# Patient Record
Sex: FEMALE | Race: ASIAN | Employment: OTHER | URBAN - METROPOLITAN AREA
[De-identification: names, ages, dates, MRNs, and addresses within clinical notes are randomized per-mention and may not be internally consistent; named-entity substitution may affect disease eponyms.]

---

## 2019-10-01 ENCOUNTER — HOSPITAL ENCOUNTER (OUTPATIENT)
Dept: WOMENS IMAGING | Age: 54
Discharge: HOME | End: 2019-10-01
Payer: COMMERCIAL

## 2019-10-01 DIAGNOSIS — M85.80 OSTEOPENIA, UNSPECIFIED LOCATION: ICD-10-CM

## 2019-10-01 DIAGNOSIS — Z12.31 ENCOUNTER FOR SCREENING MAMMOGRAM FOR BREAST CANCER: ICD-10-CM

## 2019-10-01 PROCEDURE — 77080 DXA BONE DENSITY AXIAL: CPT

## 2019-10-01 PROCEDURE — 77063 BREAST TOMOSYNTHESIS BI: CPT

## 2020-07-23 ENCOUNTER — OFFICE VISIT (OUTPATIENT)
Dept: ORTHOPEDIC SURGERY | Age: 55
End: 2020-07-23
Payer: COMMERCIAL

## 2020-07-23 VITALS
RESPIRATION RATE: 16 BRPM | OXYGEN SATURATION: 99 % | WEIGHT: 158 LBS | HEART RATE: 78 BPM | HEIGHT: 61 IN | BODY MASS INDEX: 29.83 KG/M2

## 2020-07-23 PROCEDURE — 99202 OFFICE O/P NEW SF 15 MIN: CPT | Performed by: PHYSICIAN ASSISTANT

## 2020-07-23 RX ORDER — CELECOXIB 200 MG/1
200 CAPSULE ORAL DAILY
Qty: 60 CAPSULE | Refills: 0 | Status: SHIPPED | OUTPATIENT
Start: 2020-07-23 | End: 2021-09-16

## 2020-07-23 RX ORDER — CELECOXIB 200 MG/1
200 CAPSULE ORAL
COMMUNITY
Start: 2020-05-12 | End: 2020-07-23 | Stop reason: ALTCHOICE

## 2020-07-23 NOTE — PATIENT INSTRUCTIONS
Celebrex was sent into your [facility] on [address] [facility]  Please take medication as instructed  Follow up as needed  Call the office if the pain worsen or continues after 60 days    Patient Education        Knee Arthritis: Exercises  Introduction  Here are some examples of exercises for you to try. The exercises may be suggested for a condition or for rehabilitation. Start each exercise slowly. Ease off the exercises if you start to have pain. You will be told when to start these exercises and which ones will work best for you. How to do the exercises  Knee flexion with heel slide   1. Lie on your back with your knees bent. 2. Slide your heel back by bending your affected knee as far as you can. Then hook your other foot around your ankle to help pull your heel even farther back. 3. Hold for about 6 seconds, then rest for up to 10 seconds. 4. Repeat 8 to 12 times. 5. Switch legs and repeat steps 1 through 4, even if only one knee is sore. Quad sets   1. Sit with your affected leg straight and supported on the floor or a firm bed. Place a small, rolled-up towel under your knee. Your other leg should be bent, with that foot flat on the floor. 2. Tighten the thigh muscles of your affected leg by pressing the back of your knee down into the towel. 3. Hold for about 6 seconds, then rest for up to 10 seconds. 4. Repeat 8 to 12 times. 5. Switch legs and repeat steps 1 through 4, even if only one knee is sore. Straight-leg raises to the front   1. Lie on your back with your good knee bent so that your foot rests flat on the floor. Your affected leg should be straight. Make sure that your low back has a normal curve. You should be able to slip your hand in between the floor and the small of your back, with your palm touching the floor and your back touching the back of your hand. 2. Tighten the thigh muscles in your affected leg by pressing the back of your knee flat down to the floor.  Hold your knee straight. 3. Keeping the thigh muscles tight and your leg straight, lift your affected leg up so that your heel is about 12 inches off the floor. Hold for about 6 seconds, then lower slowly. 4. Relax for up to 10 seconds between repetitions. 5. Repeat 8 to 12 times. 6. Switch legs and repeat steps 1 through 5, even if only one knee is sore. Active knee flexion   1. Lie on your stomach with your knees straight. If your kneecap is uncomfortable, roll up a washcloth and put it under your leg just above your kneecap. 2. Lift the foot of your affected leg by bending the knee so that you bring the foot up toward your buttock. If this motion hurts, try it without bending your knee quite as far. This may help you avoid any painful motion. 3. Slowly move your leg up and down. 4. Repeat 8 to 12 times. 5. Switch legs and repeat steps 1 through 4, even if only one knee is sore. Quadriceps stretch (facedown)   1. Lie flat on your stomach, and rest your face on the floor. 2. Wrap a towel or belt strap around the lower part of your affected leg. Then use the towel or belt strap to slowly pull your heel toward your buttock until you feel a stretch. 3. Hold for about 15 to 30 seconds, then relax your leg against the towel or belt strap. 4. Repeat 2 to 4 times. 5. Switch legs and repeat steps 1 through 4, even if only one knee is sore. Stationary exercise bike   1. If you do not have a stationary exercise bike at home, you can find one to ride at your local health club or community center. 2. Adjust the height of the bike seat so that your knee is slightly bent when your leg is extended downward. If your knee hurts when the pedal reaches the top, you can raise the seat so that your knee does not bend as much. 3. Start slowly. At first, try to do 5 to 10 minutes of cycling with little to no resistance. Then increase your time and the resistance bit by bit until you can do 20 to 30 minutes without pain.   4. If you start to have pain, rest your knee until your pain gets back to the level that is normal for you. Or cycle for less time or with less effort. Follow-up care is a key part of your treatment and safety. Be sure to make and go to all appointments, and call your doctor if you are having problems. It's also a good idea to know your test results and keep a list of the medicines you take. Where can you learn more? Go to https://[employer] ([employer] org and sign in to your Dapt account. Enter C159 in the Ide[employer] box to learn more about \"Knee Arthritis: Exercises. \"     If you do not have an account, please click on the \"Sign Up Now\" link. Current as of: March 2, 2020               Content Version: 12.5  © 7749-0181 Healthwise, Incorporated. Care instructions adapted under license by [facility] ([facility]). If you have questions about a medical condition or this instruction, always ask your healthcare professional. [place] [address] any warranty or liability for your use of this information.

## 2020-07-23 NOTE — PROGRESS NOTES
Review of Systems   Constitutional: Negative. HENT: Negative. Eyes: Negative. Respiratory: Negative. Cardiovascular: Negative. Gastrointestinal: Negative. Genitourinary: Negative. Musculoskeletal: Positive for arthralgias, gait problem, joint swelling and myalgias. Skin: Negative. Negative for rash and wound. Psychiatric/Behavioral: Negative. HPI:  Isidro Nava is a 62 y.o. female that is new to the office. No referral from another physician. Patient is in the office today with Right Leg Pain mainly in the right knee . Patient states this injury happened on or around 3 months ago. Pain is described a dull sharp achy pains and location of the pain is on the lateral and medial side of the knee. Patient was seen at [facility], [place], operated by [facility] and saw Dr. Tequila Mccray and patient wants a second opinion on the knee pain. They stated that it was just regular knee pain. Patient states that in her Worship they have to get down on there knees and pray. She had to do that twice in 15 day period. Patient also picked up more shifts at work causing the pain to increase. History reviewed. No pertinent past medical history. History reviewed. No pertinent surgical history. History reviewed. No pertinent family history.     Social History     Socioeconomic History    Marital status:      Spouse name: Joy Calles Number of children: 2    Years of education: 12    Highest education level: None   Occupational History    None   Social Needs    Financial resource strain: None    Food insecurity     Worry: None     Inability: None    Transportation needs     Medical: None     Non-medical: None   Tobacco Use    Smoking status: Never Smoker    Smokeless tobacco: Never Used   Substance and Sexual Activity    Alcohol use: Yes     Comment: occasionally    Drug use: No    Sexual activity: Yes     Partners: Male   Lifestyle    Physical activity     Days per week: None     Minutes per session: None    Stress: None   Relationships    Social connections     Talks on phone: None     Gets together: None     Attends Baptism service: None     Active member of club or organization: None     Attends meetings of clubs or organizations: None     Relationship status: None    Intimate partner violence     Fear of current or ex partner: None     Emotionally abused: None     Physically abused: None     Forced sexual activity: None   Other Topics Concern    None   Social History Narrative    None       Current Outpatient Medications   Medication Sig Dispense Refill    celecoxib (CELEBREX) 200 MG capsule Take 1 capsule by mouth daily 60 capsule 0     No current facility-administered medications for this visit. No Known Allergies    Review of Systems:  See above      Physical Exam:   Pulse 78   Resp 16   Ht 5' 1\" (1.549 m)   Wt 158 lb (71.7 kg)   SpO2 99%   BMI 29.85 kg/m²        Gait is Normal. The patient can bear weight on the injured extremity. Gen/Psych:Examination reveals a pleasant individual in no acute distress. The patient is oriented to time, place and person. The patient's mood and affect are appropriate. Patient appears well nourished. Body habitus is mildly overweight     Lymph:  no lymphedema in bilateral lower extremities     Skin intact in bilateral lower extremities with no ulcerations, lesions, rash, erythema. Vascular: There are no varicosities in bilateral lower extremities, sensation intact to light touch over bilateral lower extremities.     right leg/knee exam:  Leg alignment:     neutral  Quadriceps/hamstring atrophy:   no  Knee effusion:    no   Knee erythema:   no  ROM:     0-130°  Lachman:    no  Pivot Shift:    no  Posterior drawer:   no  Lateral patella glide at 30 deg's: 20%  Medial patella glide at 30 deg's: 10mm  Increased ER at 30 deg's:  no  Varus laxity at 0 and 30 deg's: no  Valguslaxity at 0 and 30 deg's: no  Recurvatum:    no  Tenderness at:   Mild posterior lateral knee pain    Knee strength is 5/5 flexion and extension  There is + L2-S1 motor and sensory function. Imaging studies:    3 views of the right knee taken reviewed in the office today show no fractures, dislocations, no significant degenerative changes within the knee, no joint effusion. The official read and interpretation of these x-rays will be done by the the [facility]         Impression:  right knee DJD      Plan:  Natural history and expected course discussed. Questions answered.   [facility] [address] [facility]  Please take medication as instructed  Follow up as needed  Call the office if the pain worsen or continues after 60 days

## 2020-07-31 ASSESSMENT — ENCOUNTER SYMPTOMS
EYES NEGATIVE: 1
GASTROINTESTINAL NEGATIVE: 1
RESPIRATORY NEGATIVE: 1

## 2020-10-08 ENCOUNTER — HOSPITAL ENCOUNTER (OUTPATIENT)
Dept: WOMENS IMAGING | Age: 55
Discharge: HOME | End: 2020-10-08
Payer: COMMERCIAL

## 2020-10-08 PROCEDURE — 77063 BREAST TOMOSYNTHESIS BI: CPT

## 2020-10-19 ENCOUNTER — OFFICE VISIT (OUTPATIENT)
Dept: ORTHOPEDIC SURGERY | Age: 55
End: 2020-10-19
Payer: COMMERCIAL

## 2020-10-19 VITALS
HEART RATE: 81 BPM | BODY MASS INDEX: 29.83 KG/M2 | HEIGHT: 61 IN | WEIGHT: 158 LBS | OXYGEN SATURATION: 98 % | RESPIRATION RATE: 16 BRPM

## 2020-10-19 PROCEDURE — 20610 DRAIN/INJ JOINT/BURSA W/O US: CPT | Performed by: PHYSICIAN ASSISTANT

## 2020-10-19 PROCEDURE — 99212 OFFICE O/P EST SF 10 MIN: CPT | Performed by: PHYSICIAN ASSISTANT

## 2020-10-19 ASSESSMENT — ENCOUNTER SYMPTOMS
RESPIRATORY NEGATIVE: 1
GASTROINTESTINAL NEGATIVE: 1
EYES NEGATIVE: 1

## 2020-10-19 NOTE — PATIENT INSTRUCTIONS
Continue weight bear as tolerated  Continue range of motion and exercises as instruction  Ice and elevate as needed  Tylenol or Motrin for pain  Injection given today in the office   Rest for 24-48 hours  Follow up 6 weeks.  If you're feeling better cancel the appointment

## 2020-10-19 NOTE — PROGRESS NOTES
Review of Systems   Constitutional: Negative. HENT: Negative. Eyes: Negative. Respiratory: Negative. Cardiovascular: Negative. Gastrointestinal: Negative. Genitourinary: Negative. Musculoskeletal: Positive for arthralgias. Skin: Negative. Negative for rash and wound. Neurological: Negative. Psychiatric/Behavioral: Negative. HPI:  Reva Sapp is a 62 y.o. female presents the office today complaining of persistent right knee pain around the patella worse when she tries to bend or squat on the right knee. Previously she was seen in the office in July and at that time she just wanted to treat the knee pain with oral medication and she was given Celebrex which has not helped. She states the pain is a 6/10, aching, sharp within the patella and lateral compartment of the knee. She has not had any injuries to the knee since I last saw her. No past medical history on file. No past surgical history on file. No family history on file.     Social History     Socioeconomic History    Marital status:      Spouse name: Orlando Morris Number of children: 2    Years of education: 12    Highest education level: None   Occupational History    None   Social Needs    Financial resource strain: None    Food insecurity     Worry: None     Inability: None    Transportation needs     Medical: None     Non-medical: None   Tobacco Use    Smoking status: Never Smoker    Smokeless tobacco: Never Used   Substance and Sexual Activity    Alcohol use: Yes     Comment: occasionally    Drug use: No    Sexual activity: Yes     Partners: Male   Lifestyle    Physical activity     Days per week: None     Minutes per session: None    Stress: None   Relationships    Social connections     Talks on phone: None     Gets together: None     Attends Ep[employer]opalian service: None     Active member of club or organization: None     Attends meetings of clubs or organizations: None expected course discussed. Questions answered. Patient Instructions   Continue weight bear as tolerated  Continue range of motion and exercises as instruction  Ice and elevate as needed  Tylenol or Motrin for pain  Injection given today in the office   Rest for 24-48 hours  Follow up 6 weeks. If you're feeling better cancel the appointment    Right knee injection procedure note    Pre-procedure diagnosis:  Right knee DJD    Post-procedure diagnosis:  same    Procedure: The planned procedure/risks/benefits/alternatives were discussed with the patient. Risks include, but are not limited to, increased pain, drug reaction, infection, bleeding, lack of improvement, neurovascular injury, and increased blood sugar levels. The patient understood and all of their questions were answered. The right knee was prepped with alcohol, then a 22 gauge needle was advanced into the inferior-lateral joint without difficulty. The joint was then injected with 3 ml 1% lidocaine, 2ml of Kenalog (40 mg/ml). The injection site was cleansed with isopropyl alcohol and a band-aid was placed. Complications:  None, the patient tolerated the procedure well. Instructions: The patient was advised to rest the knee and decrease activity for the next 24 to 48 hours. May use prescription or OTC pain relievers as needed for any post-injection pain as well as ice.

## 2020-11-16 ENCOUNTER — OFFICE VISIT (OUTPATIENT)
Dept: ORTHOPEDIC SURGERY | Age: 55
End: 2020-11-16
Payer: COMMERCIAL

## 2020-11-16 VITALS
HEART RATE: 74 BPM | HEIGHT: 61 IN | OXYGEN SATURATION: 98 % | BODY MASS INDEX: 29.83 KG/M2 | RESPIRATION RATE: 16 BRPM | WEIGHT: 158 LBS

## 2020-11-16 PROCEDURE — 20610 DRAIN/INJ JOINT/BURSA W/O US: CPT | Performed by: PHYSICIAN ASSISTANT

## 2020-11-16 PROCEDURE — 99213 OFFICE O/P EST LOW 20 MIN: CPT | Performed by: PHYSICIAN ASSISTANT

## 2020-11-16 ASSESSMENT — ENCOUNTER SYMPTOMS
RESPIRATORY NEGATIVE: 1
GASTROINTESTINAL NEGATIVE: 1
EYES NEGATIVE: 1

## 2020-11-16 NOTE — PROGRESS NOTES
I reviewed and agree with the portions of the HPI, review of systems, vital documentation and plan performed by my staff and have added/addended where appropriate. Review of Systems   Constitutional: Negative. HENT: Negative. Eyes: Negative. Respiratory: Negative. Cardiovascular: Negative. Gastrointestinal: Negative. Genitourinary: Negative. Musculoskeletal: Positive for arthralgias and joint swelling. Skin: Negative. Negative for rash and wound. Neurological: Negative. Psychiatric/Behavioral: Negative. HPI:  Enid Lim is a 60 y.o. female that presents today with complaints of left knee pain. She states that started hurting about a week or 2 ago. She states that the right knee which she received an injection and a couple weeks ago is feeling better. She has not had an injury to the left knee and she would like to do a steroid injection in the left knee if possible. She rates her pain today at a 6/10, aching along the lateral aspect of the knee. No past medical history on file. No past surgical history on file. No family history on file.     Social History     Socioeconomic History    Marital status:      Spouse name: Rasheeda Bermudez Number of children: 2    Years of education: 12    Highest education level: None   Occupational History    None   Social Needs    Financial resource strain: None    Food insecurity     Worry: None     Inability: None    Transportation needs     Medical: None     Non-medical: None   Tobacco Use    Smoking status: Never Smoker    Smokeless tobacco: Never Used   Substance and Sexual Activity    Alcohol use: Yes     Comment: occasionally    Drug use: No    Sexual activity: Yes     Partners: Male   Lifestyle    Physical activity     Days per week: None     Minutes per session: None    Stress: None   Relationships    Social connections     Talks on phone: None     Gets together: None     Attends [employer] service: None     Active member of club or organization: None     Attends meetings of clubs or organizations: None     Relationship status: None    Intimate partner violence     Fear of current or ex partner: None     Emotionally abused: None     Physically abused: None     Forced sexual activity: None   Other Topics Concern    None   Social History Narrative    None       Current Outpatient Medications   Medication Sig Dispense Refill    celecoxib (CELEBREX) 200 MG capsule Take 1 capsule by mouth daily 60 capsule 0     No current facility-administered medications for this visit. No Known Allergies    Review of Systems:  See above      Physical Exam:   Pulse 74   Resp 16   Ht 5' 1\" (1.549 m)   Wt 158 lb (71.7 kg)   SpO2 98%   BMI 29.85 kg/m²        Gait is Antalgic. The patient can bear weight on the injured extremity. Gen/Psych:Examination reveals a pleasant individual in no acute distress. The patient is oriented to time, place and person. The patient's mood and affect are appropriate. Patient appears well nourished. Body habitus is overweight     Lymph:  no lymphedema in bilateral lower extremities     Skin intact in bilateral lower extremities with no ulcerations, lesions, rash, erythema. Vascular: There are no varicosities in bilateral lower extremities, sensation intact to light touch over bilateral lower extremities.       left leg/knee exam:  Leg alignment:     neutral  Quadriceps/hamstring atrophy:   no  Knee effusion:    no   Knee erythema:   no  ROM:     0-130°  Varus laxity at 0 and 30 deg's: no  Valguslaxity at 0 and 30 deg's: no  Recurvatum:    no  Tenderness at:   Lateral joint line      Bilateral Knee strength is 5/5 flexion and extension  There is + L2-S1 motor and sensory function in bilateral lower extremities    Outside record review: office notes     Imaging studies:  4 views of the left knee show osteophyte formation of the lateral tibial plateau, mild degenerative change of the patellofemoral compartment. Overall impression is mild degenerative changes in the left knee. Impression:  left knee DJD      Plan:  Natural history and expected course discussed. Questions answered. Patient Instructions   Continue weight bear as tolerated  Continue range of motion and exercises as instruction  Ice and elevate as needed  Tylenol or Motrin for pain  Injection given today in the office   Rest for 24-48 hours  Follow up as needed      Left knee injection procedure note    Pre-procedure diagnosis:  Left knee DJD    Post-procedure diagnosis:  same    Procedure: The planned procedure/risks/benefits/alternatives were discussed with the patient. Risks include, but are not limited to, increased pain, drug reaction, infection, bleeding, lack of improvement, neurovascular injury, and increased blood sugar levels. The patient understood and all of their questions were answered. The Left knee was prepped with alcohol then a 22 gauge needle was advanced into the inferior-lateral joint without difficulty. The joint was then injected with 3 ml 1% lidocaine, 2ml of Kenalog (40 mg/ml). The injection site was cleansed with isopropyl alcohol and a band-aid was placed. Complications:  None, the patient tolerated the procedure well. Instructions: The patient was advised to rest the knee and decrease activity for the next 24 to 48 hours. May use prescription or OTC pain relievers as needed for any post-injection pain as well as ice.

## 2020-11-16 NOTE — PROGRESS NOTES
Patient came into the office today for left knee pain. Patient states that she was having lateral and posterior parts of the knee.

## 2020-11-16 NOTE — PATIENT INSTRUCTIONS
Continue weight bear as tolerated  Continue range of motion and exercises as instruction  Ice and elevate as needed  Tylenol or Motrin for pain  Injection given today in the office   Rest for 24-48 hours  Follow up as needed

## 2020-11-30 ENCOUNTER — OFFICE VISIT (OUTPATIENT)
Dept: ORTHOPEDIC SURGERY | Age: 55
End: 2020-11-30
Payer: COMMERCIAL

## 2020-11-30 VITALS — WEIGHT: 158 LBS | HEIGHT: 61 IN | RESPIRATION RATE: 14 BRPM | BODY MASS INDEX: 29.83 KG/M2

## 2020-11-30 PROCEDURE — 99211 OFF/OP EST MAY X REQ PHY/QHP: CPT | Performed by: PHYSICIAN ASSISTANT

## 2020-11-30 ASSESSMENT — ENCOUNTER SYMPTOMS
GASTROINTESTINAL NEGATIVE: 1
RESPIRATORY NEGATIVE: 1
EYES NEGATIVE: 1

## 2020-11-30 NOTE — PROGRESS NOTES
I reviewed and agree with the portions of the HPI, review of systems, vital documentation and plan performed by my staff and have added/addended where appropriate. Review of Systems   Constitutional: Negative. HENT: Negative. Eyes: Negative. Respiratory: Negative. Cardiovascular: Negative. Gastrointestinal: Negative. Genitourinary: Negative. Musculoskeletal: Positive for arthralgias. Skin: Negative. Negative for rash and wound. Neurological: Negative. Psychiatric/Behavioral: Negative. HPI:  Magda Huff is a 60 y.o.   female who presents today following up on her right knee injection as well as her left knee injection. She states that the right knee is doing great and has no pain. She states that the left knee is doing much better and only has some mild pain over the inferior medial aspect of the knee. She states that pain is intermittent and when it does occur it usually after she has been sitting for long period of time then goes to walk.         Social History     Socioeconomic History    Marital status:      Spouse name: Yung  Number of children: 2    Years of education: 12    Highest education level: Not on file   Occupational History    Not on file   Social Needs    Financial resource strain: Not on file    Food insecurity     Worry: Not on file     Inability: Not on file   [employer] needs     Medical: Not on file     Non-medical: Not on file   Tobacco Use    Smoking status: Never Smoker    Smokeless tobacco: Never Used   Substance and Sexual Activity    Alcohol use: Yes     Comment: occasionally    Drug use: No    Sexual activity: Yes     Partners: Male   Lifestyle    Physical activity     Days per week: Not on file     Minutes per session: Not on file    Stress: Not on file   Relationships    Social connections     Talks on phone: Not on file     Gets together: Not on file     Attends Baptist service: Not on file     Active member of club or organization: Not on file     Attends meetings of clubs or organizations: Not on file     Relationship status: Not on file    Intimate partner violence     Fear of current or ex partner: Not on file     Emotionally abused: Not on file     Physically abused: Not on file     Forced sexual activity: Not on file   Other Topics Concern    Not on file   Social History Narrative    Not on file       Current Outpatient Medications   Medication Sig Dispense Refill    celecoxib (CELEBREX) 200 MG capsule Take 1 capsule by mouth daily 60 capsule 0     No current facility-administered medications for this visit. No Known Allergies    Review of Systems:  See above      Physical Exam:   Resp 14   Ht 5' 1\" (1.549 m)   Wt 158 lb (71.7 kg)   BMI 29.85 kg/m²        Gait is Normal. The patient can bear weight on the injured extremity. Gen/Psych:Examination reveals a pleasant individual in no acute distress. The patient is oriented to time, place and person. The patient's mood and affect are appropriate. Patient appears well nourished. Body habitus is normal     Lymph:  no lymphedema in bilateral lower extremities     Skin intact in bilateral lower extremities with no ulcerations, lesions, rash, erythema. Vascular: There are no varicosities in bilateral lower extremities, sensation intact to light touch over bilateral lower extremities. left leg/knee exam:  Leg alignment:     neutral  Quadriceps/hamstring atrophy:   no  Knee effusion:    no   Knee erythema:   no  ROM:     0-130 degrees  Varus laxity at 0 and 30 deg's: no  Valguslaxity at 0 and 30 deg's: no  Recurvatum:    no  Tenderness at: Inferior medial joint line    Bilateral Knee strength is 5/5 flexion and extension  There is + L2-S1 motor and sensory function in bilateral lower extremities        Impression: Left knee DJD, right knee DJD-improved      Plan:  Natural history and expected course discussed.  Questions answered.   Patient Instructions   Continue weight bear as tolerated  Continue range of motion and exercises as instruction  Ice and elevate as needed  Tylenol or Motrin for pain  Follow up as needed

## 2020-11-30 NOTE — PROGRESS NOTES
Patient is here today for a follow up for her knee pain. At her last visit she was given a steoid injection. She states that the injection helped and that she now only has pain with prolonged sitting or standing located on the medial aspect of the knee. Pain level 5/10.

## 2020-11-30 NOTE — PATIENT INSTRUCTIONS
Continue weight bear as tolerated  Continue range of motion and exercises as instruction  Ice and elevate as needed  Tylenol or Motrin for pain  Follow up as needed

## 2020-12-29 ENCOUNTER — HOSPITAL ENCOUNTER (OUTPATIENT)
Dept: MRI IMAGING | Age: 55
Discharge: HOME | End: 2020-12-29
Payer: COMMERCIAL

## 2020-12-29 PROCEDURE — 73721 MRI JNT OF LWR EXTRE W/O DYE: CPT

## 2021-01-07 ENCOUNTER — TELEPHONE (OUTPATIENT)
Dept: ORTHOPEDIC SURGERY | Age: 56
End: 2021-01-07

## 2021-01-07 ENCOUNTER — OFFICE VISIT (OUTPATIENT)
Dept: ORTHOPEDIC SURGERY | Age: 56
End: 2021-01-07
Payer: COMMERCIAL

## 2021-01-07 VITALS
HEIGHT: 61 IN | BODY MASS INDEX: 29.83 KG/M2 | RESPIRATION RATE: 16 BRPM | HEART RATE: 95 BPM | OXYGEN SATURATION: 96 % | WEIGHT: 158 LBS

## 2021-01-07 DIAGNOSIS — M17.12 ARTHRITIS OF LEFT KNEE: Primary | ICD-10-CM

## 2021-01-07 PROCEDURE — 99214 OFFICE O/P EST MOD 30 MIN: CPT | Performed by: ORTHOPAEDIC SURGERY

## 2021-01-07 RX ORDER — CELECOXIB 200 MG/1
200 CAPSULE ORAL DAILY
Qty: 60 CAPSULE | Refills: 2 | Status: SHIPPED | OUTPATIENT
Start: 2021-01-07 | End: 2021-12-07

## 2021-01-07 ASSESSMENT — ENCOUNTER SYMPTOMS
COLOR CHANGE: 0
EYE PAIN: 0
WHEEZING: 0
EYE REDNESS: 0
CHEST TIGHTNESS: 0
SHORTNESS OF BREATH: 0
VOMITING: 0

## 2021-01-07 NOTE — PROGRESS NOTES
Patient returns to the office for a follow up after completion of MRI on 12/29/2020 of the left knee revealed a medial meniscus tear, however, patient wishes to discuss bilateral knee symptoms today. Patient reports having sharp pain along the suprapatellar aspect of the left knee and along the lateral aspect and patella of the right knee with pain rated between a 5-6/10 and swelling of the right knee that began a few days ago. Aggravating factors include weightbearing and ambulation with the knee popping. Denies locking or the knee giving out. She has been taking Celebrex which is providing her relief until she carries heavy objects then the bilateral knee becomes aggravated. She has had previous steroid injections administered to the bilateral knee with relief. No new injury or previous surgeries reported. Patient wishes to discuss treatment options today. MRI KNEE LEFT WO CONTRAST   Impression   1. Radial tear of the posterior root of the medial meniscus.  There is   additional vertical peripheral tearing along the posterior horn involving the   meniscocapsular junction. 2. Medial tibial plateau subchondral insufficiency fracture. 3. Advanced patellofemoral cartilage degeneration. 4. Moderate to advanced medial compartment cartilage degeneration. 5. Mild lateral compartment degenerative change.

## 2021-01-07 NOTE — PATIENT INSTRUCTIONS
MRI results discussed  Celebrex refilled  Weightbearing and activities as tolerated  Take Celebrex as needed  Rest, ice, and elevated  Will submit for approval of gel injections  Once approval is received from insurance, we will order medication and then contact you to begin series of injections

## 2021-01-07 NOTE — PROGRESS NOTES
1/7/2021   Chief Complaint   Patient presents with    Knee Pain     left        History of Present Illness:                             Erika Hill is a 60 y.o. female who presents today for evaluation of her bilateral left worse than right knee pain. She has had a few years of gradually worsening bilateral knee pain and occasional swelling and tightness issues. She has a strong family history of degenerative joint disease of the knees with multiple family members who have had knee replacements. She denies any severe traumatic events to the knees but does have worsening sharp pains in bilateral knees with repetitive or strenuous activities such as going up and down stairs or carrying heavy objects. Her pain is most significant currently in the left knee along the medial joint line and medial tibial plateau worse with prolonged standing and walking activities. She also has ongoing pain and swelling and stiffness in the right knee which is better with rest and better with use of her Celebrex. She has been unable to be as active as she would like to be because of knee pain. She feels that her exercise activities of decreased because of ongoing knee pain. She has been seen and treated in the past by SANDRA Garcia who is treated her with injections on separate occasions to each knee. Her symptoms did not respond initially to the injection but she feels like the left knee has become more painful lately. Patient returns to the office for a follow up after completion of MRI on 12/29/2020 of the left knee revealed a medial meniscus tear, however, patient wishes to discuss bilateral knee symptoms today. Patient reports having sharp pain along the suprapatellar aspect of the left knee and along the lateral aspect and patella of the right knee with pain rated between a 5-6/10 and swelling of the right knee that began a few days ago. Aggravating factors include weightbearing and ambulation with the knee popping. Denies locking or the knee giving out. She has been taking Celebrex which is providing her relief until she carries heavy objects then the bilateral knee becomes aggravated. She has had previous steroid injections administered to the bilateral knee with relief. No new injury or previous surgeries reported. Patient wishes to discuss treatment options today.                     Medical History  Patient's medications, allergies, past medical, surgical, social and family histories were reviewed and updated as appropriate. No past medical history on file. No family history on file.   Social History     Socioeconomic History    Marital status:      Spouse name: Lindell Lefort Number of children: 2    Years of education: 12    Highest education level: None   Occupational History    None   Social Needs    Financial resource strain: None    Food insecurity     Worry: None     Inability: None    Transportation needs     Medical: None     Non-medical: None   Tobacco Use    Smoking status: Never Smoker    Smokeless tobacco: Never Used   Substance and Sexual Activity    Alcohol use: Yes     Comment: occasionally    Drug use: No    Sexual activity: Yes     Partners: Male   Lifestyle    Physical activity     Days per week: None     Minutes per session: None    Stress: None   Relationships    Social connections     Talks on phone: None     Gets together: None     Attends [employer] service: None Active member of club or organization: None     Attends meetings of clubs or organizations: None     Relationship status: None    Intimate partner violence     Fear of current or ex partner: None     Emotionally abused: None     Physically abused: None     Forced sexual activity: None   Other Topics Concern    None   Social History Narrative    None     Current Outpatient Medications   Medication Sig Dispense Refill    celecoxib (CELEBREX) 200 MG capsule Take 1 capsule by mouth daily 60 capsule 2    celecoxib (CELEBREX) 200 MG capsule Take 1 capsule by mouth daily 60 capsule 0     No current facility-administered medications for this visit. No Known Allergies    Review of Systems:   Review of Systems   Constitutional: Negative for chills and fever. HENT: Negative for congestion and sneezing. Eyes: Negative for pain and redness. Respiratory: Negative for chest tightness, shortness of breath and wheezing. Cardiovascular: Negative for chest pain and palpitations. Gastrointestinal: Negative for vomiting. Musculoskeletal: Positive for arthralgias. Skin: Negative for color change and rash. Neurological: Negative for weakness and numbness. Psychiatric/Behavioral: Negative for agitation. The patient is not nervous/anxious. Examination:  General Exam:  Vitals: Pulse 95   Resp 16   Ht 5' 1\" (1.549 m)   Wt 158 lb (71.7 kg)   SpO2 96%   BMI 29.85 kg/m²    Physical Exam  Vitals signs and nursing note reviewed. Constitutional:       Appearance: Normal appearance. HENT:      Head: Normocephalic and atraumatic. Eyes:      Conjunctiva/sclera: Conjunctivae normal.      Pupils: Pupils are equal, round, and reactive to light. Neck:      Musculoskeletal: Normal range of motion.    Pulmonary:      Effort: Pulmonary effort is normal.   Musculoskeletal:      Right hip: Normal. Left hip: She exhibits normal range of motion, normal strength, no tenderness, no bony tenderness, no swelling, no crepitus and no deformity. Right knee: She exhibits swelling and effusion. She exhibits normal range of motion, no ecchymosis, no deformity, no erythema, normal alignment, no LCL laxity, normal patellar mobility, no bony tenderness and no MCL laxity. Tenderness found. Medial joint line tenderness noted. No lateral joint line tenderness noted. Comments: Left Lower Extremity:  There is moderate tenderness to palpation throughout the knee most significant along the medial joint line. There is a mild effusion present and mild global swelling at the knee anteriorly. Mild restricted range of motion at the knee with  full extension and knee flexion up to 130 degrees with pain at the extremes of motion. There is mild crepitation at the knee during active range of motion. There is normal knee alignment. There is 5 out of 5 strength with knee flexion and extension. There is no instability to varus or valgus stress testing or anterior and posterior drawer testing. Sensation is intact to light touch throughout the lower extremity. There is a moderately positive James's test with tenderness to palpation and pain along the medial joint line. Skin is intact. Pulses intact    No pain with active range of motion of the hip. Strength and range of motion of the hip are intact. No tenderness to palpation at the hip. Skin:     General: Skin is warm and dry. Neurological:      Mental Status: She is alert and oriented to person, place, and time.    Psychiatric:         Mood and Affect: Mood normal.         Behavior: Behavior normal.            Diagnostic testing:  X-rays reviewed in office, I independently reviewed the films in the office today:     X-ray images of the left knee from 11/16/2020 were reviewed by myself and discussed with the patient: There is evidence of moderate tricompartmental degenerative joint disease most significant in the medial compartment where there is joint space narrowing and prominent osteophyte formation. Mild varus alignment    X-ray images of the right knee from 7/23/2020 were reviewed by myself and discussed with the patient today:   There is evidence of moderate tricompartmental degenerative joint disease most significant in the medial compartment where there is joint space narrowing and prominent osteophyte formation. Mild varus alignment    MRI KNEE LEFT WO CONTRAST   Impression   1. Radial tear of the posterior root of the medial meniscus.  There is   additional vertical peripheral tearing along the posterior horn involving the   meniscocapsular junction. 2. Medial tibial plateau subchondral insufficiency fracture. 3. Advanced patellofemoral cartilage degeneration. 4. Moderate to advanced medial compartment cartilage degeneration. 5. Mild lateral compartment degenerative change.        Office Procedures:  No orders of the defined types were placed in this encounter. Assessment and Plan  1. Bilateral knee primary osteoarthritis    2. Left knee medial meniscus tear    3. Left knee medial tibial plateau insufficiency stress fracture    We reviewed the findings of the x-rays and the MRI of her left knee. I have explained to her that there is evidence of significant tricompartmental degenerative joint disease with other findings which are the result of the severity of her arthritic condition. We discussed the degenerative type tear of the medial meniscus and the subchondral insufficiency fracture. I recommended that we focus on treatment of her arthritic knee condition and maximize her nonoperative treatments at this time. We briefly discussed surgical intervention if she fails conservative measures. This would consist of knee replacement surgery. She has tried steroid injections in the past with only mild relief of her symptoms. I have recommended that we try viscosupplementation injections to see if this provides better and more long-lasting relief of her degenerative joint disease symptoms. We will get approval for bilateral knee viscosupplementation injections and call her for the series of 3 injections once they are approved. I recommended continued home exercise program.  I advised her on low impact exercising and stretching. I recommended maintaining a healthy weight and trying to lose additional weight to help decrease stress and force across her knees. She has done well in the past with Celebrex and I have given her a prescription for Celebrex 200 mg daily with refills. We discussed the potential for muscle relaxers. I recommend that she discuss this more in detail with her primary care physician.         Electronically signed by Brennan Castrejon MD on 1/7/2021 at 11:40 AM

## 2021-01-21 ENCOUNTER — OFFICE VISIT (OUTPATIENT)
Dept: ORTHOPEDIC SURGERY | Age: 56
End: 2021-01-21
Payer: COMMERCIAL

## 2021-01-21 VITALS
RESPIRATION RATE: 16 BRPM | HEART RATE: 86 BPM | HEIGHT: 61 IN | WEIGHT: 156 LBS | BODY MASS INDEX: 29.45 KG/M2 | OXYGEN SATURATION: 96 %

## 2021-01-21 DIAGNOSIS — M17.0 BILATERAL PRIMARY OSTEOARTHRITIS OF KNEE: ICD-10-CM

## 2021-01-21 DIAGNOSIS — M17.11 ARTHRITIS OF KNEE, RIGHT: ICD-10-CM

## 2021-01-21 DIAGNOSIS — M17.12 ARTHRITIS OF LEFT KNEE: Primary | ICD-10-CM

## 2021-01-21 PROCEDURE — 20610 DRAIN/INJ JOINT/BURSA W/O US: CPT | Performed by: ORTHOPAEDIC SURGERY

## 2021-01-21 NOTE — PATIENT INSTRUCTIONS
Euflexxa # 1   Rest the bilateral knee for 24-48 hours  Work on ROM and strengthening exercises as tolerated  Continue to take Celebrex as prescribed   Weightbearing and activities as tolerated  Follow up next week for Euflexxa # 2

## 2021-01-21 NOTE — PROGRESS NOTES
Patient returns to the office to begin visco-supplementation series of bilateral knee injections of Euflexxa. Pain remains between a 3-4/10 with no changes in symptom sensation or location. She continues to take Celebrex as prescribed with ongoing pain, swelling, and stiffness in the right knee and pain along the medial joint line and medial tibial plateau. No new or worsening symptoms reported.
The left knee was prepped with alcohol. A 22 gauge needle was inserted into the knee joint. The knee was then injected with 2 mL of Euflexxa. A sterile Band-Aid was applied. The patient tolerated the procedure well with no complications.     Follow-up next week for the second of 3 injections to bilateral knees        Electronically signed by Murray Del Castillo MD on 1/21/2021 at 3:36 PM

## 2021-01-28 ENCOUNTER — OFFICE VISIT (OUTPATIENT)
Dept: ORTHOPEDIC SURGERY | Age: 56
End: 2021-01-28
Payer: COMMERCIAL

## 2021-01-28 VITALS
RESPIRATION RATE: 15 BRPM | HEIGHT: 61 IN | WEIGHT: 156 LBS | OXYGEN SATURATION: 98 % | BODY MASS INDEX: 29.45 KG/M2 | HEART RATE: 92 BPM

## 2021-01-28 DIAGNOSIS — M17.0 BILATERAL PRIMARY OSTEOARTHRITIS OF KNEE: ICD-10-CM

## 2021-01-28 PROCEDURE — 20610 DRAIN/INJ JOINT/BURSA W/O US: CPT | Performed by: ORTHOPAEDIC SURGERY

## 2021-01-28 NOTE — PATIENT INSTRUCTIONS
Continue weight-bearing as tolerated. Continue range of motion exercises as instructed. Ice and elevate as needed. Tylenol or Motrin for pain.   Gel injection #2 given today in the bilateral knees   Follow up next week for your #3 injections

## 2021-01-28 NOTE — PROGRESS NOTES
VISCO-SUPPLEMENTATION INJECTION (Number 2)    HISTORY OF PRESENT ILLNESS (HPI):   Rafael Jay is a 60y.o. year old female who is here for follow up for visco-supplementation injection number 2 for No diagnosis found. Desirae Machado PAST HISTORY:   Unless otherwise specified in this note, the past history is reviewed today with the patient and is as follows-    No Known Allergies    Current Outpatient Medications   Medication Sig Dispense Refill    celecoxib (CELEBREX) 200 MG capsule Take 1 capsule by mouth daily 60 capsule 2    celecoxib (CELEBREX) 200 MG capsule Take 1 capsule by mouth daily 60 capsule 0     No current facility-administered medications for this visit. PHYSICAL EXAM:   There were no vitals taken for this visit. The left knee is examined:  Inspection:  Skin is intact. No erythema. Palpation:  No swelling or acute tenderness. Neuro/Vascular/Motor:  Sensation to light touch intact. Capillary refill brisk. No focal motor deficits. DIAGNOSIS:   No diagnosis found. PROCEDURE:   Left Knee and right knee Injection Procedure: Bilateral Knees  Multiple treatment options were discussed. Joint injection was recommended. Details of the procedure, potential risks, and potential benefits were discussed. Patient's questions were answered. Patient elected to proceed with procedure. Medication: [employer] #:[id number]  Lot #:[id number]  Procedure:  Sterile technique was used as the skin over the injection site was double preped with alcohol. Each knee joint was then injected with the above listed medication. A sterile bandage was placed over the injection site. The patient tolerated the procedure well without complication. The patient is scheduled for the third injection in one week.

## 2021-02-04 ENCOUNTER — OFFICE VISIT (OUTPATIENT)
Dept: ORTHOPEDIC SURGERY | Age: 56
End: 2021-02-04
Payer: COMMERCIAL

## 2021-02-04 VITALS — HEIGHT: 61 IN | RESPIRATION RATE: 15 BRPM | BODY MASS INDEX: 29.45 KG/M2 | WEIGHT: 156 LBS

## 2021-02-04 DIAGNOSIS — M17.0 BILATERAL PRIMARY OSTEOARTHRITIS OF KNEE: Primary | ICD-10-CM

## 2021-02-04 PROCEDURE — 20610 DRAIN/INJ JOINT/BURSA W/O US: CPT | Performed by: ORTHOPAEDIC SURGERY

## 2021-02-04 RX ORDER — FENOPROFEN CALCIUM 200 MG/1
CAPSULE ORAL
Status: ON HOLD | COMMUNITY
Start: 2021-01-12 | End: 2021-10-07 | Stop reason: HOSPADM

## 2021-02-04 RX ORDER — KETOPROFEN 25 MG/1
CAPSULE ORAL
Status: ON HOLD | COMMUNITY
Start: 2021-01-12 | End: 2021-10-07 | Stop reason: HOSPADM

## 2021-02-04 RX ORDER — CELECOXIB 200 MG/1
200 CAPSULE ORAL DAILY
Qty: 60 CAPSULE | Refills: 1 | Status: ON HOLD
Start: 2021-02-04 | End: 2021-10-07 | Stop reason: HOSPADM

## 2021-02-04 NOTE — PATIENT INSTRUCTIONS
Continue weight-bearing as tolerated. Continue range of motion exercises as instructed. Ice and elevate as needed. Tylenol or Motrin for pain.   Gel injection #3 today in the bilateral knees  Follow up as needed

## 2021-02-04 NOTE — PROGRESS NOTES
VISCO-SUPPLEMENTATION INJECTION (Number 3)    HISTORY OF PRESENT ILLNESS (HPI):   Wright Brittle is a 60y.o. year old female who is here for follow up for visco-supplementation injection number 3 for No diagnosis found. Marly Delgado PAST HISTORY:   Unless otherwise specified in this note, the past history is reviewed today with the patient and is as follows-    No Known Allergies    Current Outpatient Medications   Medication Sig Dispense Refill    Fenoprofen Calcium 200 MG CAPS TAKE TWO TABLETS FOUR TIMES A DAY.  Ketoprofen 25 MG CAPS TAKE THREE TABLETS THREE TIMES A DAY      celecoxib (CELEBREX) 200 MG capsule Take 1 capsule by mouth daily 60 capsule 2    celecoxib (CELEBREX) 200 MG capsule Take 1 capsule by mouth daily 60 capsule 0     No current facility-administered medications for this visit. PHYSICAL EXAM:   Resp 15   Ht 5' 1\" (1.549 m)   Wt 156 lb (70.8 kg)   BMI 29.48 kg/m²     The right knee is examined:  Inspection:  Skin is intact. No erythema. Palpation:  No swelling or acute tenderness. Neuro/Vascular/Motor:  Sensation to light touch intact. Capillary refill brisk. No focal motor deficits. DIAGNOSIS:   No diagnosis found. PROCEDURE:   Left Knee and right knee Injection Procedure:  Multiple treatment options were discussed. Joint injection was recommended. Details of the procedure, potential risks, and potential benefits were discussed. Patient's questions were answered. Patient elected to proceed with procedure.   Medication: Paolo Lynn [id number]  Lot #:[id number] Procedure:  Sterile technique was used as the skin over the injection site was double preped with alcohol. The knee joint was then injected with the above listed medication. A sterile bandage was placed over the injection site. The patient tolerated the procedure well without complication. The patient will follow-up as needed. We discussed potential repeat injections to be done in 6 months or a year if she has good result with these injections.

## 2021-03-23 ENCOUNTER — OFFICE VISIT (OUTPATIENT)
Dept: ORTHOPEDIC SURGERY | Age: 56
End: 2021-03-23
Payer: COMMERCIAL

## 2021-03-23 VITALS — OXYGEN SATURATION: 98 % | HEART RATE: 80 BPM | HEIGHT: 61 IN | BODY MASS INDEX: 29.45 KG/M2 | WEIGHT: 156 LBS

## 2021-03-23 DIAGNOSIS — M17.12 PRIMARY OSTEOARTHRITIS OF LEFT KNEE: ICD-10-CM

## 2021-03-23 DIAGNOSIS — M17.11 PRIMARY OSTEOARTHRITIS OF RIGHT KNEE: Primary | ICD-10-CM

## 2021-03-23 PROCEDURE — 99213 OFFICE O/P EST LOW 20 MIN: CPT | Performed by: PHYSICIAN ASSISTANT

## 2021-03-23 ASSESSMENT — ENCOUNTER SYMPTOMS
ALLERGIC/IMMUNOLOGIC NEGATIVE: 1
GASTROINTESTINAL NEGATIVE: 1
EYES NEGATIVE: 1
RESPIRATORY NEGATIVE: 1

## 2021-03-23 NOTE — PATIENT INSTRUCTIONS
Continue to weight bear as tolerated  Continue range of motion as tolerated  Ice and elevate as needed  Tylenol or Motrin for pain as needed  Physical therapy ordered  Follow up in as needed with Dr. Clayton Zeng

## 2021-03-23 NOTE — PROGRESS NOTES
on file     Relationship status: Not on file    Intimate partner violence     Fear of current or ex partner: Not on file     Emotionally abused: Not on file     Physically abused: Not on file     Forced sexual activity: Not on file   Other Topics Concern    Not on file   Social History Narrative    Not on file     Current Outpatient Medications   Medication Sig Dispense Refill    Fenoprofen Calcium 200 MG CAPS TAKE TWO [facility].  Ketoprofen 25 MG CAPS TAKE THREE TABLETS THREE TIMES A DAY      celecoxib (CELEBREX) 200 MG capsule Take 1 capsule by mouth daily 60 capsule 1    celecoxib (CELEBREX) 200 MG capsule Take 1 capsule by mouth daily 60 capsule 2    celecoxib (CELEBREX) 200 MG capsule Take 1 capsule by mouth daily 60 capsule 0     No current facility-administered medications for this visit. No Known Allergies    Review of Systems:   Review of Systems   Constitutional: Negative. HENT: Negative. Eyes: Negative. Respiratory: Negative. Cardiovascular: Negative. Gastrointestinal: Negative. Endocrine: Negative. Genitourinary: Negative. Musculoskeletal: Positive for arthralgias. Skin: Negative. Allergic/Immunologic: Negative. Neurological: Negative. Hematological: Negative. Psychiatric/Behavioral: Negative. Physical Exam:  Pulse 80   Ht 5' 1\" (1.549 m)   Wt 156 lb (70.8 kg)   SpO2 98%   BMI 29.48 kg/m²      Gait is Normal.   The patient can bear weight on the injured extremity. Gen/Psych: Examination reveals a pleasant individual in no acute distress. The patient is oriented to time, place, and person. The patient's mood and affect are appropriate. Patient appears well nourished. Lymph: No obvious lymphedema in bilateral lower extremity     Skin: Intact in bilateral lower extremity with no ulcerations, lesions, rash, erythema. Vascular:  There are no obvious varicosities in bilateral lower extremity, sensation present to light touch over bilateral lower extremity. Capillary refill less than 3. Musculoskeletal:  Bilateral leg/knee exam:  Inspection:    No erythema or ecchymosis. No swelling noted. Leg alignment:     neutral  Quadriceps/hamstring atrophy:   no  Knee effusion:    no   ROM:       Left:      0-140  Right knee:     0-140  Lachman:    no  Ant/Posterior drawer:   no  Lateral patella glide at 30 deg's: 20%  Medial patella glide at 30 deg's: 10mm  Varus laxity at 0 and 30 deg's: no  Valguslaxity at 0 and 30 deg's: no  Tenderness at:   Tenderness to palpation over the lateral joint line region. Mild tenderness over the medial joint line region. Nontender to palpation of posterior calf, soft compartments. Knee strength is 5/5 flexion and extension. No pain with hip internal rotation and external rotation. Patient can perform ankle dorsiflexion and plantarflexion. Imaging: No new imaging obtained today    Impression:   1. Primary osteoarthritis of the right knee  2. Primary osteoarthritis of the left knee      Plan:   She was seen in clinic today for evaluation of her bilateral knee pain. Patient states she received her last gel injection on 2/4/2021 and states it did help with her pain. Patient states recently she has been doing a lot of walking and standing and has had increased pain in her right knee. X-ray imaging the patient's right and left knees from 7/23/2020 and 11/16/2020 were reviewed which showed moderate knee arthritis. Patient denies any recent injury. Treatment options were discussed with the patient including injections, physical therapy, and surgical intervention. Patient was agreeable to physical therapy and it was ordered for her. Patient also states that she has been taking her Celebrex and I instructed the patient to take the prescription as prescribed. The patient will follow up as needed.     Continue to weight bear as tolerated  Continue range of motion as tolerated  Ice and elevate as needed  Tylenol or Motrin for pain as needed  Physical therapy ordered  Follow up in as needed with Dr. Jeanette Ortiz    Please note this report has been partially produced using speech recognition software and may contain errors related to that system including errors in grammar, punctuation, and spelling, as well as words and phrases that may be inappropriate. If there are any questions or concerns please feel free to contact the dictating provider for clarification.

## 2021-03-23 NOTE — PROGRESS NOTES
Patient returns with complaints of bilateral knee pain right is greater than left. She started having increased pain about 1 week ago. She works on her feet. She states the left is not as severe and thinks it is from over compensation. Onset pain was 10/10, but today is 6/10. She has previous MRI last December and had a series of 3 Gel injections bilaterally. Last injection was 2/4/21.

## 2021-04-07 ENCOUNTER — HOSPITAL ENCOUNTER (OUTPATIENT)
Dept: PHYSICAL THERAPY | Age: 56
Setting detail: THERAPIES SERIES
Discharge: HOME | End: 2021-04-07
Payer: COMMERCIAL

## 2021-04-07 PROCEDURE — 97161 PT EVAL LOW COMPLEX 20 MIN: CPT

## 2021-04-07 PROCEDURE — 97110 THERAPEUTIC EXERCISES: CPT

## 2021-04-07 NOTE — PROGRESS NOTES
Physical Therapy  Initial Assessment  Date: 2021  Patient Name: Paulina Romeo  MRN: [id number]  : 1965     Treatment Diagnosis: B Knee OA, DJD, Pain    Restrictions       Subjective   General  Additional Pertinent Hx: B knee pain with standing/walking more than 2 hrs. or after sitting. Does wear some knee braces that help. Referring Practitioner: GLADIS Spangler  Diagnosis: B knee OA  PT Visit Information  PT Insurance Information: [employer]  Subjective  Subjective: B knee pain. Vision/Hearing  Vision  Vision: Impaired  Hearing  Hearing: Within functional limits    Orientation  Orientation  Overall Orientation Status: Within Functional Limits    Social/Functional History  Social/Functional History  Lives With: Spouse  Type of Home: House  Home Layout: Two level; Able to Live on Main level with bedroom/bathroom(crawls up steps if she has to perfrom very many)  ADL Assistance: [address] [place] [address] Avenue: Independent  Homemaking Responsibilities: Yes  Ambulation Assistance: Independent  Transfer Assistance: Independent  Active : Yes  Type of occupation: owns/runs [employer]  Leisure & Hobbies: indoor bicycle, walking,    Objective     Observation/Palpation  Palpation: non tender to palp. Observation: tibial/genu varus    AROM RLE (degrees)  RLE General AROM: 0-140. AROM LLE (degrees)  LLE General AROM: 0-140    Strength RLE  Comment: R knee ext 4+, 4/5 flex.  hip 4/5  Strength LLE  Comment: knee ext 4/5, 4/5 flex.  hip 4/5     Additional Measures  Special Tests: negative instability tests        Assessment   Conditions Requiring Skilled Therapeutic Intervention  Assessment: Pt presents with complaints of B knee pain that worsens with standing and walking. She relates standing/walking tolerances of about 2 hr but can extend it to about 10 when wearing knee braces. She must stand for 10+hrs for her work. She does have some weakness at her hips and knees.   Knee ROM is good and

## 2021-04-07 NOTE — PLAN OF CARE
Outpatient Physical Therapy                  [x] Phone: [phone]   Fax: [phone]    Pediatric Therapy                                    [] Phone: [phone]   Fax: [phone]  [facility]                                      [] Phone: [phone]   Fax: [phone]      To: Referring Practitioner: GLADIS Carlos    From: Richard Mann PT     Patient: Martha John       : 1965  Diagnosis: B knee OA       Date: 2021    Physical Therapy Certification/Re-Certification Form  Dear GLADIS Carlos. The following patient has been evaluated for physical therapy services and for therapy to continue, Please review the attached evaluation and/or summary of the patient's plan of care, and verify that you agree therapy should continue by signing the attached document and sending it back to our office. Patient is a  61 yo male who presents with B knee pain which impacts on adls;patient's goal is to decrease pain ;patient reports that pain  limits activities including standing, walking; PT to address patient's goals, impairments and activity limitations with skilled interventions checked in plan of care;patient's level of function prior to onset of pain was independent, without limitations due to knee pain; did not observe any barriers to learning during PT eval; learning preferences include demonstration, practice, and handouts; patient expressed understanding of HEP; patient appears to be motivated to participate in an active PT program and to be compliant with HEP expectations;patient assisted in developing treatment plan and goals;  DME is currently being used compression sleeve/brace;      Current functional level (based on [place] )   :12    Assessment:  Assessment: Pt presents with complaints of B knee pain that worsens with standing and walking. She relates standing/walking tolerances of about 2 hr but can extend it to about 10 when wearing knee braces.   She must stand for 10+hrs for her work. She does have some weakness at her hips and knees. Knee ROM is good and ligaments appear intact. Crepitus noted at B knees with ROM. Plan of Care/Treatment to date:  [x] Therapeutic Exercise    [] Aquatics:  [x] Therapeutic Activity    [] Ultrasound  [] Elec Stimulation  [x] Gait Training     [] Cervical Traction [] Lumbar Traction  [x] Neuromuscular Re-education [] Cold/hotpack [] Iontophoresis   [x] Instruction in HEP       [x] Manual Therapy     [] vasopneumatic            [] Self care home management        []Dry needling trigger point point/pain management          Frequency/Duration:  # Days per week: [x] 1 day # Weeks: [] 1 week [] 5 weeks     [] 2 days   [] 2 weeks [x] 6 weeks     [] 3 days   [] 3 weeks [] 7 weeks     [] 4 days   [] 4 weeks [] 8 weeks    Rehab Potential/Progress: [] Excellent [x] Good [] Fair  [] Poor     Goals:       Long term goals  Time Frame for Long term goals : 6 weeks  Long term goal 1: I in home program.  Long term goal 2: stand/work full day wtih minimal pain  Long term goal 3: up/down steps with reciprocal pattern, minimal pain. Electronically signed by:  Roberto Adam PT, 4/7/2021, 5:54 PM              If you have any questions or concerns, please don't hesitate to call.   Thank you for your referral.      Physician Signature:_________________Date:____________Time: ________  By signing above, therapists plan is approved by physician

## 2021-04-07 NOTE — FLOWSHEET NOTE
Outpatient Physical Therapy  [place]           [x] Phone: [phone]   Fax: [phone]  [facility]           [] Phone: [phone]   Fax: [phone]        Physical Therapy Daily Treatment Note  Date:  2021    Patient Name:  Padmini Motta    :  1965  MRN: [id number]  Restrictions/Precautions:    Diagnosis:   Diagnosis: B knee OA  Date of Injury/Surgery: na  Treatment Diagnosis: Treatment Diagnosis: B Knee OA, DJD, Pain    Insurance/Certification information: PT Insurance Information: [employer]   Referring Physician:  Referring Practitioner: Grace Corrales [facility]  Next Doctor Visit:    Plan of care signed (Y/N): n   Outcome Measure:   Visit# / total visits:   1/  Pain level: 0-10/10   Goals:          Long term goals  Time Frame for Long term goals : 6 weeks  Long term goal 1: I in home program.  Long term goal 2: stand/work full day wtih minimal pain  Long term goal 3: up/down steps with reciprocal pattern, minimal pain. Summary of Evaluation: Assessment: Pt presents with complaints of B knee pain that worsens with standing and walking. She relates standing/walking tolerances of about 2 hr but can extend it to about 10 when wearing knee braces. She must stand for 10+hrs for her work. She does have some weakness at her hips and knees. Knee ROM is good and ligaments appear intact. Crepitus noted at B knees with ROM. Subjective:  See eval         Any changes in Ambulatory Summary Sheet? None        Objective:  See eval   Prior to today's treatment session, patient was screened for signs and symptoms related to COVID-19 including but not limited to verbally answering questions related to feeling ill, cough, or SOB, along with taking temperature via forehead thermometer. Patient presented with all negative signs and symptoms and had no fever >100 degrees Fahrenheit this date.          Exercises: (No more than 4 columns)   Exercise/Equipment Date 21 Date Date           WARM UP TABLE      bridge 2x10     clamshells      SLR x20                    STANDING      HIp 4 way x10 w blue     Wall slide/sits      Step up/downs                                   PROPRIOCEPTION      Single leg dead lifts                              MODALITIES                      Other Therapeutic Activities/Education:        Home Exercise Program:  Hip 4 way, SLR, bridge      Manual Treatments:        Modalities:        Communication with other providers:        Assessment:  (Response towards treatment session) (Pain Rating)    Assessment: Pt presents with complaints of B knee pain that worsens with standing and walking. She relates standing/walking tolerances of about 2 hr but can extend it to about 10 when wearing knee braces. She must stand for 10+hrs for her work. She does have some weakness at her hips and knees. Knee ROM is good and ligaments appear intact. Crepitus noted at B knees with ROM.       Plan for Next Session:        Time In / Time Out:   1500/1545             Timed Code/Total Treatment Minutes:  10/45      Next Progress Note due:        Plan of Care Interventions:  [x] Therapeutic Exercise  [] Modalities:  [] Therapeutic Activity     [] Ultrasound  [] Estim  [] Gait Training      [] Cervical Traction [] Lumbar Traction  [] Neuromuscular Re-education    [] Cold/hotpack [] Iontophoresis   [x] Instruction in HEP      [] Vasopneumatic   [] Dry Needling    [] Manual Therapy               [] Aquatic Therapy              Electronically signed by:  Karl Infante 4/8/2021, 7:51 AM

## 2021-05-27 NOTE — PROGRESS NOTES
Outpatient Physical Therapy        [x] Phone: [phone]   Fax: [phone]   Physician:  Erwin Murguia PAC     From: Donovan Humphreys PT, PT     Patient: Marilyn Adamson                  : 1965  Diagnosis:  B knee pain     Date: 2021  Treatment Diagnosis:  B Knee OA, DJD, Pain      []  Progress Note                [x]  Discharge Note    Total Visits to date:  1  Cancels/No-shows to date:  1  Subjective:  Pt attended evaluation and did not show up for follow up appointments. See evaluation for details. Plan of Care/Treatment to date:  [x] Therapeutic Exercise    [] Modalities:  [] Therapeutic Activity     [] Ultrasound  [] Electric Stimulation  [] Gait Training      [] Cervical Traction    [] Lumbar Traction  [] Neuromuscular Re-education  [] Cold/hotpack [] Iontophoresis  [x] Instruction in HEP      Other:  [] Manual Therapy       []  Vasopneumatic  [] Self care management                           [] Dry needling trigger point/pain management                ? Objective/Significant Findings At Last Visit/Comments:              Patient Status: [] Continue per initial plan of Care     [x] Patient now discharged     [] Additional visits requested, Please re-certify for additional visits:    Electronically signed by:  Donovan Humphreys PT, , 2021, 11:47 AM    If you have any questions or concerns, please don't hesitate to call.   Thank you for your referral.

## 2021-07-20 ENCOUNTER — OFFICE VISIT (OUTPATIENT)
Dept: ORTHOPEDIC SURGERY | Age: 56
End: 2021-07-20
Payer: COMMERCIAL

## 2021-07-20 VITALS
WEIGHT: 159 LBS | BODY MASS INDEX: 30.02 KG/M2 | OXYGEN SATURATION: 98 % | RESPIRATION RATE: 15 BRPM | HEIGHT: 61 IN | HEART RATE: 76 BPM

## 2021-07-20 DIAGNOSIS — M17.11 PRIMARY OSTEOARTHRITIS OF RIGHT KNEE: Primary | ICD-10-CM

## 2021-07-20 DIAGNOSIS — M17.12 PRIMARY OSTEOARTHRITIS OF LEFT KNEE: ICD-10-CM

## 2021-07-20 DIAGNOSIS — M17.0 BILATERAL PRIMARY OSTEOARTHRITIS OF KNEE: ICD-10-CM

## 2021-07-20 PROCEDURE — 99213 OFFICE O/P EST LOW 20 MIN: CPT | Performed by: ORTHOPAEDIC SURGERY

## 2021-07-20 PROCEDURE — 20610 DRAIN/INJ JOINT/BURSA W/O US: CPT | Performed by: ORTHOPAEDIC SURGERY

## 2021-07-20 RX ORDER — CHOLECALCIFEROL (VITAMIN D3) 1250 MCG
CAPSULE ORAL
COMMUNITY
Start: 2021-05-01

## 2021-07-20 RX ORDER — IBUPROFEN 200 MG
CAPSULE ORAL
COMMUNITY

## 2021-07-20 ASSESSMENT — ENCOUNTER SYMPTOMS
COLOR CHANGE: 0
SHORTNESS OF BREATH: 0
CHEST TIGHTNESS: 0

## 2021-07-20 NOTE — PATIENT INSTRUCTIONS
Continue weight-bearing as tolerated. Continue range of motion exercises as instructed. Ice and elevate as needed. Tylenol or Motrin for pain.   Steroid injection given today in the right knee  Call our office when you would like to get gel injections

## 2021-07-20 NOTE — PROGRESS NOTES
Patient presents to the office today for bilateral knee pain. Pt states she started to notice increase swelling and pain about 3 weeks ago. Pt states the gel injections did help with her pain but she is on her feet all day long and has noticed increase \"fluid\" in her knees. R>L today. Pt states pain will increase in the evening when she sits down for the day.

## 2021-07-21 NOTE — PROGRESS NOTES
Head: Normocephalic and atraumatic. Eyes:      Conjunctiva/sclera: Conjunctivae normal.      Pupils: Pupils are equal, round, and reactive to light. Pulmonary:      Effort: Pulmonary effort is normal.   Musculoskeletal:      Cervical back: Normal range of motion. Right hip: No deformity, tenderness, bony tenderness or crepitus. Normal range of motion. Normal strength. Left hip: Normal.      Left knee: No swelling, deformity, effusion, ecchymosis or lacerations. Normal range of motion. No tenderness. No medial joint line or lateral joint line tenderness. No LCL laxity or MCL laxity. Normal alignment and normal meniscus. Comments: Right Lower Extremity:  There is moderate tenderness to palpation throughout the knee most significant along the medial joint line. There is a mild effusion present and mild global swelling at the knee anteriorly. Mild restricted range of motion at the knee with approximately 3 degrees short of full extension and knee flexion up to 120 degrees with pain at the extremes of motion. There is mild crepitation at the knee during active range of motion. There is mild varus knee alignment. There is 5 out of 5 strength with knee flexion and extension. There is no instability to varus or valgus stress testing or anterior and posterior drawer testing. Sensation is intact to light touch throughout the lower extremity. There is a moderately positive James's test with tenderness to palpation and pain along the medial joint line. Skin is intact. Pulses intact    No pain with active range of motion of the hip. Strength and range of motion of the hip are intact. No tenderness to palpation at the hip. Left Lower Extremity:    There is mild diffuse tenderness to palpation throughout the knee maximally along the medial joint line. There is mild global swelling anteriorly at the knee with no obvious effusion. There is 5 out of 5 strength with knee flexion and extension. Sensation is intact throughout the lower extremity. There is full range of motion at the knee with discomfort at the extremes of motion, especially terminal flexion. No instability with varus or valgus stress testing or anterior/posterior drawer testing. Medial James's test produces mild pain but no palpable click. Skin is intact. Normal knee alignment. Pulses intact. Skin:     General: Skin is warm and dry. Neurological:      Mental Status: She is alert and oriented to person, place, and time. Psychiatric:         Mood and Affect: Mood normal.         Behavior: Behavior normal.              Diagnostic testing:  X-rays reviewed in office, I independently reviewed the films in the office today:   X-ray images of the right knee from 7/23/2020 were reviewed by myself: There is evidence of tricompartmental degenerative joint disease at the knee most significant on the medial patellofemoral compartments      Office Procedures:  Orders Placed This Encounter   Procedures    SC ARTHROCENTESIS ASPIR&/INJ MAJOR JT/BURSA W/O US       Assessment and Plan  1. Bilateral right worse than left knee primary osteoarthritis    I discussed the degenerative findings of the right knee on x-ray and exam with the patient. I have recommended maximizing conservative treatments for the arthritic knee condition. We discussed the use of steroid injections as needed for severe symptoms. Currently patient is having significant pain on a daily basis and this is impacting activities of daily living and ability to get comfortable at rest.  The patient would like to have an injection performed today. Procedure Note, Right Knee Intraarticular Injection:  The right knee was prepped with alcohol and injected intra-articularly with 80 mg of Kenalog and 4 mL of 1% lidocaine through a 22-gauge needle. Sterile Band-Aid was applied. The patient tolerated it well without complications.     I have recommended weight loss and maintaining a healthy weight to decrease the forces across the degenerative knee joint. We discussed the importance of maintaining flexibility and strength at the knee. I have advised the patient to have a home exercise program that includes stretching and low impact activities such as walking, biking, or elliptical machines. She will continue her home exercise program and anti-inflammatory medications. We discussed potential for repeat gel injections if she does not respond well to the current steroid injection today. She will call should like to have the gel injections worked for her. We discussed briefly today and the potential for knee replacement surgery in the future if her symptoms are not responding to these treatment options.             Electronically signed by Klaus Swanson MD on 7/20/2021 at 8:49 PM

## 2021-08-06 ENCOUNTER — TELEPHONE (OUTPATIENT)
Dept: ORTHOPEDIC SURGERY | Age: 56
End: 2021-08-06

## 2021-08-12 NOTE — TELEPHONE ENCOUNTER
I called [facility] at [phone] and spoke with Dann Martinez.  Who initiated the prior auth for Orthovisc  B/L knee M.17.11 + M17.12 as a buy and bill medication    Start date: 8/16/21  Ref # [id number]

## 2021-08-18 NOTE — TELEPHONE ENCOUNTER
Orthovisc is not a preferred drug. Synvisc  is the preferred medication for the patient. I requested a prior auth be started.     Ana Luisa Hawkins [phone]      Tracking # [id number]

## 2021-09-07 ENCOUNTER — OFFICE VISIT (OUTPATIENT)
Dept: ORTHOPEDIC SURGERY | Age: 56
End: 2021-09-07
Payer: COMMERCIAL

## 2021-09-07 VITALS
HEART RATE: 95 BPM | BODY MASS INDEX: 30.02 KG/M2 | HEIGHT: 61 IN | RESPIRATION RATE: 15 BRPM | OXYGEN SATURATION: 98 % | WEIGHT: 159 LBS

## 2021-09-07 DIAGNOSIS — M17.11 PRIMARY OSTEOARTHRITIS OF RIGHT KNEE: ICD-10-CM

## 2021-09-07 DIAGNOSIS — M17.0 BILATERAL PRIMARY OSTEOARTHRITIS OF KNEE: ICD-10-CM

## 2021-09-07 DIAGNOSIS — M17.12 PRIMARY OSTEOARTHRITIS OF LEFT KNEE: ICD-10-CM

## 2021-09-07 DIAGNOSIS — Z01.818 PRE-OP TESTING: Primary | ICD-10-CM

## 2021-09-07 PROCEDURE — 20610 DRAIN/INJ JOINT/BURSA W/O US: CPT | Performed by: ORTHOPAEDIC SURGERY

## 2021-09-07 PROCEDURE — 99214 OFFICE O/P EST MOD 30 MIN: CPT | Performed by: ORTHOPAEDIC SURGERY

## 2021-09-07 NOTE — PATIENT INSTRUCTIONS
Continue weight-bearing as tolerated. Continue range of motion exercises as instructed. Ice and elevate as needed. Tylenol or Motrin for pain. We will schedule surgery at soonest convenience. If you have any questions regarding your surgery please call our office and ask to speak with Vianney.  [phone]    Gel injection in the left knee today  Follow up in one week for another injection in the left knee

## 2021-09-07 NOTE — PROGRESS NOTES
VISCO-SUPPLEMENTATION INJECTION (Number 1)    HISTORY OF PRESENT ILLNESS (HPI):   Rochelle Hensley is a 60y.o. year old female who is here for follow up for visco-supplementation injection number 1 for   1. Primary osteoarthritis of right knee    2. Primary osteoarthritis of left knee    3. Bilateral primary osteoarthritis of knee    . PAST HISTORY:   Unless otherwise specified in this note, the past history is reviewed today with the patient and is as follows-    No Known Allergies    Current Outpatient Medications   Medication Sig Dispense Refill    calcium carbonate (OYSTER SHELL CALCIUM 500 MG) 1250 (500 Ca) MG tablet Take by mouth      Cholecalciferol (VITAMIN D3) 1.25 MG (86841 UT) CAPS TAKE ONE CAPSULE BY MOUTH ONCE A WEEK      Cholecalciferol (VITAMIN D) 10 MCG (400 UNIT) CAPS Capsule Take 2 capsules by mouth      Fenoprofen Calcium 200 MG CAPS TAKE TWO TABLETS FOUR TIMES A DAY.  Ketoprofen 25 MG CAPS TAKE THREE TABLETS THREE TIMES A DAY      celecoxib (CELEBREX) 200 MG capsule Take 1 capsule by mouth daily 60 capsule 1    celecoxib (CELEBREX) 200 MG capsule Take 1 capsule by mouth daily 60 capsule 2    celecoxib (CELEBREX) 200 MG capsule Take 1 capsule by mouth daily 60 capsule 0     No current facility-administered medications for this visit. PHYSICAL EXAM:   Pulse 95   Resp 15   Ht 5' 1\" (1.549 m)   Wt 159 lb (72.1 kg)   SpO2 98%   BMI 30.04 kg/m²     The left knee is examined:  Inspection:  Skin is intact. No erythema. Palpation:  No swelling or acute tenderness. Neuro/Vascular/Motor:  Sensation to light touch intact. Capillary refill brisk. No focal motor deficits. DIAGNOSIS:     1. Primary osteoarthritis of right knee    2. Primary osteoarthritis of left knee    3. Bilateral primary osteoarthritis of knee        PROCEDURE:   Left Knee Aspiration / Injection Procedure:  Multiple treatment options were discussed. Joint injection was recommended.   Details of the procedure, potential risks, and potential benefits were discussed. Patient's questions were answered. Patient elected to proceed with procedure.   Medication: Synvisc  NDC #:[address]-2  Lot #:[id number]  Procedure:  Sterile technique was used as the skin

## 2021-09-07 NOTE — PROGRESS NOTES
Patient presents to the office today for fu of the bilateral knees. Pt states pain today is a 5/10 in the bilateral knees. Pt states that pain will increase with prolong walking and standing. Pt states that she is having a difficult time with going up and down the stairs. Pt states she is having to wear her braces do to the pain. Pt states that she is very stiff in the morning and her knees are locking on her.

## 2021-09-08 ASSESSMENT — ENCOUNTER SYMPTOMS
WHEEZING: 0
EYE PAIN: 0
EYE REDNESS: 0
VOMITING: 0
CHEST TIGHTNESS: 0
SHORTNESS OF BREATH: 0
COLOR CHANGE: 0

## 2021-09-08 NOTE — PROGRESS NOTES
9/7/2021   Chief Complaint   Patient presents with    Knee Pain     bilateral knee         History of Present Illness:                             Andre Somers is a 60 y.o. female who presents today for further discussion of her ongoing bilateral right worse than left knee pain. She has ongoing swelling and stiffness at the knees and difficulty with standing and walking throughout the day. Viscosupplementation injections performed earlier this year provided some temporary relief of her symptoms but this was short-lived and lasted only 2 to 3 months. She is interested in discussing further treatments and feels that given the severity of her pain and dysfunction that she would like to discuss the possibility of surgical intervention for knee replacement. She complains of deep aching pain in the medial joint line of bilateral knees which is worse with standing and walking but she does have pain even at rest and while sleeping at night. She feels stiffness and weakness with the knees and is afraid that they may give out or cause her to fall. She wears braces to help with support but continues to move slowly and cannot exercise or be active on a daily basis the way she would like. She has failed medication therapy, home exercise program, injection therapy, bracing, and activity modification. Patient presents to the office today for fu of the bilateral knees. Pt states pain today is a 5/10 in the bilateral knees. Pt states that pain will increase with prolong walking and standing. Pt states that she is having a difficult time with going up and down the stairs. Pt states she is having to wear her braces do to the pain. Pt states that she is very stiff in the morning and her knees are locking on her. Medical History  Patient's medications, allergies, past medical, surgical, social and family histories were reviewed and updated as appropriate. No past medical history on file.   No past surgical history on file. No family history on file. Social History     Socioeconomic History    Marital status:      Spouse name: Lola Ramirez Number of children: 2    Years of education: 12    Highest education level: None   Occupational History    None   Tobacco Use    Smoking status: Never Smoker    Smokeless tobacco: Never Used   Substance and Sexual Activity    Alcohol use: Yes     Comment: occasionally    Drug use: No    Sexual activity: Yes     Partners: Male   Other Topics Concern    None   Social History Narrative    None     Social Determinants of Health     Financial Resource Strain:     Difficulty of Paying Living Expenses:    Food Insecurity:     Worried About Running Out of Food in the Last Year:     Ran Out of Food in the Last Year:    Transportation Needs:     Lack of Transportation (Medical):  Lack of Transportation (Non-Medical):    Physical Activity:     Days of Exercise per Week:     Minutes of Exercise per Session:    Stress:     Feeling of Stress :    Social Connections:     Frequency of Communication with Friends and Family:     Frequency of Social Gatherings with Friends and Family:     Attends [employer] Services:     Active Member of Clubs or Organizations:     Attends Club or Organization Meetings:     Marital Status:    Intimate Partner Violence:     Fear of Current or Ex-Partner:     Emotionally Abused:     Physically Abused:     Sexually Abused:      Current Outpatient Medications   Medication Sig Dispense Refill    calcium carbonate (OYSTER SHELL CALCIUM 500 MG) 1250 (500 Ca) MG tablet Take by mouth      Cholecalciferol (VITAMIN D3) 1.25 MG (64144 UT) CAPS TAKE ONE CAPSULE BY MOUTH ONCE A WEEK      Cholecalciferol (VITAMIN D) 10 MCG (400 UNIT) CAPS Capsule Take 2 capsules by mouth      Fenoprofen Calcium 200 MG CAPS TAKE TWO TABLETS FOUR TIMES A DAY.       Ketoprofen 25 MG CAPS TAKE THREE TABLETS THREE TIMES A DAY      celecoxib (CELEBREX) 200 MG capsule Take 1 capsule by mouth daily 60 capsule 1    celecoxib (CELEBREX) 200 MG capsule Take 1 capsule by mouth daily 60 capsule 2    celecoxib (CELEBREX) 200 MG capsule Take 1 capsule by mouth daily 60 capsule 0     No current facility-administered medications for this visit. No Known Allergies      Review of Systems   Constitutional: Negative for chills and fever. HENT: Negative for congestion and sneezing. Eyes: Negative for pain and redness. Respiratory: Negative for chest tightness, shortness of breath and wheezing. Cardiovascular: Negative for chest pain and palpitations. Gastrointestinal: Negative for vomiting. Musculoskeletal: Positive for arthralgias. Skin: Negative for color change and rash. Neurological: Negative for weakness and numbness. Psychiatric/Behavioral: Negative for agitation. The patient is not nervous/anxious. Examination:  General Exam:  Vitals: Pulse 95   Resp 15   Ht 5' 1\" (1.549 m)   Wt 159 lb (72.1 kg)   SpO2 98%   BMI 30.04 kg/m²    Physical Exam  Vitals and nursing note reviewed. Constitutional:       Appearance: Normal appearance. HENT:      Head: Normocephalic and atraumatic. Eyes:      Conjunctiva/sclera: Conjunctivae normal.      Pupils: Pupils are equal, round, and reactive to light. Pulmonary:      Effort: Pulmonary effort is normal.   Musculoskeletal:      Cervical back: Normal range of motion. Right hip: No deformity, tenderness, bony tenderness or crepitus. Normal range of motion. Normal strength. Left hip: Normal.      Left knee: No swelling, deformity, effusion, ecchymosis or lacerations. Normal range of motion. No tenderness. No medial joint line or lateral joint line tenderness. No LCL laxity or MCL laxity. Normal alignment and normal meniscus.       Comments: Right Lower Extremity:    There is moderate to severe tenderness to palpation diffusely throughout the knee, most significant along the medial joint line. There is a moderate knee joint effusion present and mild global swelling anteriorly. Mild restricted range of motion at the knee with approximately 5 degrees lack of full extension and knee flexion up to 120 degrees with pain at the extremes of motion. There is mild crepitation during active range of motion. There is mild varus knee alignment. There is 5 out of 5 strength with knee flexion and extension. There is no instability to varus or valgus stress testing and anterior and posterior drawer testing. Sensation is intact to light touch throughout the lower extremity. There is positive medial James's test with tenderness to palpation and pain along the medial joint line. Skin is intact. Pulses are intact    No pain with active range of motion of the hip. Strength and range of motion of the hip are intact. No tenderness to palpation at the hip. Left Lower Extremity:  There is moderate tenderness to palpation throughout the knee most significant along the medial joint line. There is a mild effusion present and mild global swelling at the knee anteriorly. Mild restricted range of motion at the knee with approximately 3 degrees short of full extension and knee flexion up to 130 degrees with pain at the extremes of motion. There is mild crepitation at the knee during active range of motion. There is mild varus knee alignment. There is 5 out of 5 strength with knee flexion and extension. There is no instability to varus or valgus stress testing or anterior and posterior drawer testing. Sensation is intact to light touch throughout the lower extremity. There is a positive James's test with tenderness to palpation and pain along the medial joint line. Skin is intact. Pulses intact    No pain with active range of motion of the hip. Strength and range of motion of the hip are intact. No tenderness to palpation at the hip.      Skin:     General: Skin is warm and dry. Neurological:      Mental Status: She is alert and oriented to person, place, and time. Psychiatric:         Mood and Affect: Mood normal.         Behavior: Behavior normal.            Diagnostic testing:  X-ray images were reviewed by myself and discussed with the patient:      Office Procedures:  Orders Placed This Encounter   Procedures    CT KNEE RIGHT WO CONTRAST     Standing Status:   Future     Standing Expiration Date:   9/7/2022     Scheduling Instructions:      Please schedule patient for Clarence Robot Protocol  9/20/2021 through 9/24/2021     Order Specific Question:   Reason for exam:     Answer:   Clarence Robot Protocol  date of surgery 10/6/2021    Covid-19 Ambulatory     Standing Status:   Future     Standing Expiration Date:   9/7/2022     Scheduling Instructions:      Saline media preferred given current shortage of viral transport media but both acceptable     Order Specific Question:   Is this test for diagnosis or screening? Answer:   Screening     Order Specific Question:   Symptomatic for COVID-19 as defined by CDC? Answer:   No     Order Specific Question:   Date of Symptom Onset     Answer:   N/A     Order Specific Question:   Hospitalized for COVID-19? Answer:   No     Order Specific Question:   Admitted to ICU for COVID-19? Answer:   No     Order Specific Question:   Employed in healthcare setting? Answer:   No     Order Specific Question:   Resident in a congregate (group) care setting? Answer:   No     Order Specific Question:   Pregnant? Answer:   No     Order Specific Question:   Previously tested for COVID-19?      Answer:   No    [facility]     Referral Priority:   Elective     Referral Type:   Eval and Treat     Referral Reason:   Specialty Services Required     Requested Specialty:   Physical Therapy     Number of Visits Requested:   1    [place] ARTHROCENTESIS ASPIR&/INJ MAJOR JT/BURSA W/O US       Assessment and Plan  1. Bilateral right worse than left knee primary osteoarthritis    We discussed the severity of her degenerative joint disease of the bilateral knees right worse than left. She has been through extensive conservative treatments in the past and feels that her right knee is becoming more severe despite these treatments. She is having severe difficulties on a daily basis and would like to discuss moving forward with right total knee replacement. She has similar but less severe symptoms in the left knee. I recommended of the address only one knee replacement at a time. I have recommended we proceed with injection therapy at the last symptomatic left knee to help alleviate some of her symptoms and address her left knee replacement when she is sufficiently healed from right knee surgery. Left Knee Injection Procedure:  Multiple treatment options were discussed. Joint injection was recommended. Details of the procedure, potential risks, and potential benefits were discussed. Patient's questions were answered. Patient elected to proceed with procedure. Medication: Synvisc  NDC #:[address]-0  Lot #:[id number]  Procedure:  Sterile technique     Left knee was prepped with alcohol and then the knee was injected intra-articularly with a 21-gauge needle and 2 mL of Synvisc was injected without difficulty. A sterile Band-Aid was applied. We discussed the severity of the degenerative findings on both on the x-rays and physical exam.  The patient feels that they have exhausted conservative measures with respect to the right knee. The patient has previously tried injections, therapy, over-the-counter pain medications, and activity modification. The pain level is severe and bothers the patient on a daily basis, including interrupting sleep at night. Activities of daily living are difficult to perform.   The patient has trouble getting dressed, getting up from a seated position, climbing stairs, and has fear of falling. The pain and dysfunction at the knee are severely limiting at this stage and the patient would like to consider surgical intervention. I have recommended surgical intervention for a right total knee replacement. We discussed the risks, benefits, and alternatives to surgery. We discussed the intended benefits from a well-functioning replacement and the anticipated recovery process. We discussed potential risks and complications including but not limited to DVT/PE, infection, stiffness, loosening, and fracture. We discussed pain control, physical therapy, and anticoagulation during the perioperative timeframe. The patient would like to proceed with knee replacement surgery and will be enrolled in the joint replacement class    Plan will be to proceed with a robotic assisted right total knee replacement. She will be on aspirin postoperatively for DVT prophylaxis. Plan will be for her to spend the night overnight in the hospital and be discharged home the next day if she is doing well with therapy.       Electronically signed by Lisbeth Dubin, MD on 9/8/2021 at 12:53 PM

## 2021-09-09 ENCOUNTER — TELEPHONE (OUTPATIENT)
Dept: ORTHOPEDIC SURGERY | Age: 56
End: 2021-09-09

## 2021-09-09 NOTE — TELEPHONE ENCOUNTER
Called patient's insurance spoke with Ginny Jefferson code [id number] out patient procedure no prior auth required it has a frequency of 1 and 1 day aloud  Ref#[id number]

## 2021-09-14 ENCOUNTER — HOSPITAL ENCOUNTER (OUTPATIENT)
Dept: CT IMAGING | Age: 56
Discharge: HOME | End: 2021-09-14
Payer: COMMERCIAL

## 2021-09-14 ENCOUNTER — OFFICE VISIT (OUTPATIENT)
Dept: ORTHOPEDIC SURGERY | Age: 56
End: 2021-09-14
Payer: COMMERCIAL

## 2021-09-14 VITALS
BODY MASS INDEX: 30.02 KG/M2 | HEART RATE: 100 BPM | WEIGHT: 159 LBS | RESPIRATION RATE: 16 BRPM | HEIGHT: 61 IN | OXYGEN SATURATION: 99 %

## 2021-09-14 DIAGNOSIS — Z01.818 PRE-OP TESTING: ICD-10-CM

## 2021-09-14 DIAGNOSIS — M17.12 PRIMARY OSTEOARTHRITIS OF LEFT KNEE: ICD-10-CM

## 2021-09-14 DIAGNOSIS — M17.11 PRIMARY OSTEOARTHRITIS OF RIGHT KNEE: Primary | ICD-10-CM

## 2021-09-14 DIAGNOSIS — M17.11 PRIMARY OSTEOARTHRITIS OF RIGHT KNEE: ICD-10-CM

## 2021-09-14 DIAGNOSIS — Z09 FOLLOW UP: ICD-10-CM

## 2021-09-14 PROCEDURE — 73700 CT LOWER EXTREMITY W/O DYE: CPT

## 2021-09-14 PROCEDURE — 20610 DRAIN/INJ JOINT/BURSA W/O US: CPT | Performed by: ORTHOPAEDIC SURGERY

## 2021-09-14 NOTE — PROGRESS NOTES
Patient returns to the office for a follow up on her left knee pain. Symptoms within the left knee remain unchanged with pain rated at a 5/10 today with continued stiffness and pain upon prolonged periods of ambulation and rest when transitioning from seated to standing position. Patient would like to proceed with administration of Synvisc for the left knee at this time. She is scheduled for surgery on October 6, 2021 for a right knee arthroplasty.

## 2021-09-14 NOTE — PATIENT INSTRUCTIONS
Synvisc # 2  Rest the left knee for 24-48 hours  Rest, ice, and elevate as needed  Weightbearing and activities as scheduled   Continue to take medication as prescribed   Follow up next week for Synvisc # 3

## 2021-09-15 NOTE — PROGRESS NOTES
9/14/2021   Chief Complaint   Patient presents with    Knee Pain     bilateral        History of Present Illness:                             Mariama Michael is a 60 y.o. female who returns today for viscosupplementation injection to her left knee and further discussion of her upcoming right total knee replacement. Patient returns to the office for a follow up on her left knee pain. Symptoms within the left knee remain unchanged with pain rated at a 5/10 today with continued stiffness and pain upon prolonged periods of ambulation and rest when transitioning from seated to standing position. Patient would like to proceed with administration of Synvisc for the left knee at this time. She is scheduled for surgery on October 6, 2021 for a right knee arthroplasty. Medical History  Patient's medications, allergies, past medical, surgical, social and family histories were reviewed and updated as appropriate. Review of Systems                                            Examination:  General Exam:  Vitals: Pulse 100   Resp 16   Ht 5' 1\" (1.549 m)   Wt 159 lb (72.1 kg)   SpO2 99%   BMI 30.04 kg/m²    Physical Exam         Office Procedures:  Orders Placed This Encounter   Procedures    MA ARTHROCENTESIS ASPIR&/INJ MAJOR JT/BURSA W/O US       Assessment and Plan  1. Bilateral right worse than left knee primary osteoarthritis    We are going to move forward with right total knee replacement as previously discussed. We will continue with her viscosupplementation injections to the left knee. Procedure Note, Left Knee Intra-articular Injection:    The left knee was prepped with alcohol. A 22 gauge needle was inserted into the knee joint. The knee was then injected with Synvisc. A sterile Band-Aid was applied. The patient tolerated the procedure well with no complications.     Follow-up next week for the third in the series of 3 injections    Electronically signed by Saniya Marin MD on 9/15/2021 at 2:34 PM

## 2021-09-16 RX ORDER — SODIUM CHLORIDE, SODIUM LACTATE, POTASSIUM CHLORIDE, CALCIUM CHLORIDE 600; 310; 30; 20 MG/100ML; MG/100ML; MG/100ML; MG/100ML
INJECTION, SOLUTION INTRAVENOUS CONTINUOUS
Status: CANCELLED | OUTPATIENT
Start: 2021-10-06

## 2021-09-21 ENCOUNTER — HOSPITAL ENCOUNTER (OUTPATIENT)
Dept: PREADMISSION TESTING | Age: 56
Discharge: HOME | End: 2021-09-25
Payer: COMMERCIAL

## 2021-09-21 VITALS
SYSTOLIC BLOOD PRESSURE: 148 MMHG | OXYGEN SATURATION: 98 % | DIASTOLIC BLOOD PRESSURE: 90 MMHG | RESPIRATION RATE: 18 BRPM | BODY MASS INDEX: 29.23 KG/M2 | WEIGHT: 154.8 LBS | HEART RATE: 76 BPM | HEIGHT: 61 IN

## 2021-09-21 LAB
ALBUMIN SERPL-MCNC: 4.4 GM/DL (ref 3.4–5)
ALP BLD-CCNC: 92 IU/L (ref 40–129)
ALT SERPL-CCNC: 18 U/L (ref 10–40)
ANION GAP SERPL CALCULATED.3IONS-SCNC: 11 MMOL/L (ref 4–16)
AST SERPL-CCNC: 19 IU/L (ref 15–37)
BACTERIA: NEGATIVE /HPF
BASOPHILS ABSOLUTE: 0.1 K/CU MM
BASOPHILS RELATIVE PERCENT: 1.1 % (ref 0–1)
BILIRUB SERPL-MCNC: 0.2 MG/DL (ref 0–1)
BILIRUBIN URINE: NEGATIVE MG/DL
BLOOD, URINE: NEGATIVE
BUN BLDV-MCNC: 8 MG/DL (ref 6–23)
CALCIUM SERPL-MCNC: 9.3 MG/DL (ref 8.3–10.6)
CHLORIDE BLD-SCNC: 105 MMOL/L (ref 99–110)
CLARITY: CLEAR
CO2: 25 MMOL/L (ref 21–32)
COLOR: ABNORMAL
CREAT SERPL-MCNC: 0.6 MG/DL (ref 0.6–1.1)
DIFFERENTIAL TYPE: ABNORMAL
EOSINOPHILS ABSOLUTE: 0.2 K/CU MM
EOSINOPHILS RELATIVE PERCENT: 3.1 % (ref 0–3)
ERYTHROCYTE SEDIMENTATION RATE: 13 MM/HR (ref 0–30)
GFR AFRICAN AMERICAN: >60 ML/MIN/1.73M2
GFR NON-AFRICAN AMERICAN: >60 ML/MIN/1.73M2
GLUCOSE BLD-MCNC: 107 MG/DL (ref 70–99)
GLUCOSE, URINE: NEGATIVE MG/DL
HCT VFR BLD CALC: 39.2 % (ref 37–47)
HEMOGLOBIN: 12.5 GM/DL (ref 12.5–16)
IMMATURE NEUTROPHIL %: 0.5 % (ref 0–0.43)
KETONES, URINE: NEGATIVE MG/DL
LEUKOCYTE ESTERASE, URINE: NEGATIVE
LYMPHOCYTES ABSOLUTE: 1.3 K/CU MM
LYMPHOCYTES RELATIVE PERCENT: 22.8 % (ref 24–44)
MCH RBC QN AUTO: 26.4 PG (ref 27–31)
MCHC RBC AUTO-ENTMCNC: 31.9 % (ref 32–36)
MCV RBC AUTO: 82.9 FL (ref 78–100)
MONOCYTES ABSOLUTE: 0.4 K/CU MM
MONOCYTES RELATIVE PERCENT: 7.5 % (ref 0–4)
NITRITE URINE, QUANTITATIVE: NEGATIVE
NUCLEATED RBC %: 0 %
PDW BLD-RTO: 13 % (ref 11.7–14.9)
PH, URINE: 7 (ref 5–8)
PLATELET # BLD: 272 K/CU MM (ref 140–440)
PMV BLD AUTO: 10 FL (ref 7.5–11.1)
POTASSIUM SERPL-SCNC: 4.1 MMOL/L (ref 3.5–5.1)
PROTEIN UA: NEGATIVE MG/DL
RBC # BLD: 4.73 M/CU MM (ref 4.2–5.4)
RBC URINE: ABNORMAL /HPF (ref 0–6)
SEGMENTED NEUTROPHILS ABSOLUTE COUNT: 3.6 K/CU MM
SEGMENTED NEUTROPHILS RELATIVE PERCENT: 65 % (ref 36–66)
SODIUM BLD-SCNC: 141 MMOL/L (ref 135–145)
SPECIFIC GRAVITY UA: 1.01 (ref 1–1.03)
SQUAMOUS EPITHELIAL: <1 /HPF
TOTAL IMMATURE NEUTOROPHIL: 0.03 K/CU MM
TOTAL NUCLEATED RBC: 0 K/CU MM
TOTAL PROTEIN: 6.9 GM/DL (ref 6.4–8.2)
TRICHOMONAS: ABNORMAL /HPF
UROBILINOGEN, URINE: NEGATIVE MG/DL (ref 0.2–1)
WBC # BLD: 5.5 K/CU MM (ref 4–10.5)
WBC UA: 2 /HPF (ref 0–5)

## 2021-09-21 PROCEDURE — 85652 RBC SED RATE AUTOMATED: CPT

## 2021-09-21 PROCEDURE — 81001 URINALYSIS AUTO W/SCOPE: CPT

## 2021-09-21 PROCEDURE — 93005 ELECTROCARDIOGRAM TRACING: CPT | Performed by: ORTHOPAEDIC SURGERY

## 2021-09-21 PROCEDURE — 87086 URINE CULTURE/COLONY COUNT: CPT

## 2021-09-21 PROCEDURE — U0003 INFECTIOUS AGENT DETECTION BY NUCLEIC ACID (DNA OR RNA); SEVERE ACUTE RESPIRATORY SYNDROME CORONAVIRUS 2 (SARS-COV-2) (CORONAVIRUS DISEASE [COVID-19]), AMPLIFIED PROBE TECHNIQUE, MAKING USE OF HIGH THROUGHPUT TECHNOLOGIES AS DESCRIBED BY CMS-2020-01-R: HCPCS

## 2021-09-21 PROCEDURE — 80053 COMPREHEN METABOLIC PANEL: CPT

## 2021-09-21 PROCEDURE — 85025 COMPLETE CBC W/AUTO DIFF WBC: CPT

## 2021-09-21 ASSESSMENT — PAIN DESCRIPTION - ORIENTATION: ORIENTATION: RIGHT

## 2021-09-21 ASSESSMENT — PAIN DESCRIPTION - PAIN TYPE: TYPE: CHRONIC PAIN

## 2021-09-21 ASSESSMENT — PAIN SCALES - GENERAL: PAINLEVEL_OUTOF10: 9

## 2021-09-21 ASSESSMENT — PAIN DESCRIPTION - DESCRIPTORS: DESCRIPTORS: ACHING;CONSTANT

## 2021-09-21 ASSESSMENT — PAIN DESCRIPTION - FREQUENCY: FREQUENCY: CONTINUOUS

## 2021-09-21 ASSESSMENT — PAIN DESCRIPTION - LOCATION: LOCATION: KNEE

## 2021-09-21 NOTE — PROGRESS NOTES
.Surgery 10/06/2021   you will be called      with times 10/05/2021               1. Do not eat or drink anything after midnight - unless instructed by your doctor prior to surgery. This includes                   no water, chewing gum or mints. 2. Follow your directions as prescribed by the doctor for your procedure and medications. 3. Check with your Doctor regarding stopping Plavix, Coumadin, Lovenox,Effient,Pradaxa,Xarelto, Fragmin or other blood thinners and                   follow their instructions. Do Not take any medications the morning of surgery, unless otherwise instructed by your surgeon   4. Do not smoke, and do not drink any alcoholic beverages 24 hours prior to surgery. This includes NA Beer. 5. You may brush your teeth and gargle the morning of surgery. DO NOT SWALLOW WATER   6. You MUST make arrangements for a responsible adult to take you home after your surgery and be able to check on you every couple                   hours for the day. You will not be allowed to leave alone or drive yourself home. It is strongly suggested someone stay with you the first 24                   hrs. Your surgery will be cancelled if you do not have a ride home. 7. Please wear simple, loose fitting clothing to the hospital.  [employer] not bring valuables (money, credit cards, checkbooks, etc.) Do not wear any                   makeup (including no eye makeup) or nail polish on your fingers or toes. 8. DO NOT wear any jewelry or piercings on day of surgery. All body piercing jewelry must be removed. 9. If you have dentures, they will be removed before going to the OR; we will provide you a container. If you wear contact lenses or glasses,                  they will be removed; please bring a case for them. 10. If you  have a Living Will and Durable Power of  for Healthcare, please bring in a copy.            11. Please bring picture ID,  insurance card, paperwork from the doctors office    (H & P, Consent, & card for implantable devices). 12. Take a shower the night before or morning of your procedure, do not apply any lotion, oil or powder. 13. Wear a mask covering your nose & mouth when entering the hospital. Have your covid-19 test performed within 2-7 days of your                  surgery. Quarantine yourself after the test until after your surgery. Covid Test will be in Dr. Zakia Hammer office 2-7 days before surgery.

## 2021-09-22 ENCOUNTER — OFFICE VISIT (OUTPATIENT)
Dept: ORTHOPEDIC SURGERY | Age: 56
End: 2021-09-22
Payer: COMMERCIAL

## 2021-09-22 VITALS
OXYGEN SATURATION: 98 % | WEIGHT: 150 LBS | BODY MASS INDEX: 28.32 KG/M2 | HEIGHT: 61 IN | RESPIRATION RATE: 16 BRPM | HEART RATE: 77 BPM

## 2021-09-22 DIAGNOSIS — M17.12 PRIMARY OSTEOARTHRITIS OF LEFT KNEE: ICD-10-CM

## 2021-09-22 LAB
CULTURE: NORMAL
Lab: NORMAL
SPECIMEN: NORMAL

## 2021-09-22 PROCEDURE — 20610 DRAIN/INJ JOINT/BURSA W/O US: CPT | Performed by: PHYSICIAN ASSISTANT

## 2021-09-22 NOTE — PROGRESS NOTES
VISCO-SUPPLEMENTATION INJECTION (Number 3)    HISTORY OF PRESENT ILLNESS (HPI):   Polo Mendenhall is a 60y.o. year old female who is here for follow up for visco-supplementation injection number 3 for   1. Primary osteoarthritis of left knee        PAST HISTORY:   Unless otherwise specified in this note, the past history is reviewed today with the patient and is as follows-    No Known Allergies    Current Outpatient Medications   Medication Sig Dispense Refill    calcium carbonate (OYSTER SHELL CALCIUM 500 MG) 1250 (500 Ca) MG tablet Take by mouth      Cholecalciferol (VITAMIN D3) 1.25 MG (96711 UT) CAPS TAKE ONE CAPSULE BY MOUTH ONCE A WEEK      Fenoprofen Calcium 200 MG CAPS TAKE TWO TABLETS FOUR TIMES A DAY.  Ketoprofen 25 MG CAPS TAKE THREE TABLETS THREE TIMES A DAY      celecoxib (CELEBREX) 200 MG capsule Take 1 capsule by mouth daily 60 capsule 1    Cholecalciferol (VITAMIN D) 10 MCG (400 UNIT) CAPS Capsule Take 2 capsules by mouth      celecoxib (CELEBREX) 200 MG capsule Take 1 capsule by mouth daily 60 capsule 2     No current facility-administered medications for this visit. PHYSICAL EXAM:   Pulse 77   Resp 16   Ht 5' 1\" (1.549 m)   Wt 150 lb (68 kg)   SpO2 98%   BMI 28.34 kg/m²     The left knee is examined:  Inspection:  Skin is intact. No erythema. Palpation:  No swelling or acute tenderness. Neuro/Vascular/Motor:  Sensation to light touch intact. Capillary refill brisk. No focal motor deficits. DIAGNOSIS:     1. Primary osteoarthritis of left knee        PROCEDURE:   Left Knee Aspiration / Injection Procedure:  Multiple treatment options were discussed. Joint injection was recommended. Details of the procedure, potential risks, and potential benefits were discussed. Patient's questions were answered. Patient elected to proceed with procedure.   Medication: Synvisc 2 mL  NDC#:[address]-7  Procedure:  Sterile technique was used as the skin over the injection site was prepped with alcohol. The left knee joint was then injected with the above listed medication. A sterile bandage was placed over the injection site. The patient tolerated the procedure well without complication.

## 2021-09-22 NOTE — PATIENT INSTRUCTIONS
Synvisc # 3  Rest both knees for 24-48 hours  Work on ROM and strengthening of knees and legs   May take NSAIDS or anti-inflammatories as needed  Weightbearing as tolerated  Follow up as needed  Follow up post op for your right knee TKA

## 2021-09-23 LAB
EKG ATRIAL RATE: 69 BPM
EKG DIAGNOSIS: NORMAL
EKG P AXIS: 53 DEGREES
EKG P-R INTERVAL: 128 MS
EKG Q-T INTERVAL: 406 MS
EKG QRS DURATION: 72 MS
EKG QTC CALCULATION (BAZETT): 435 MS
EKG R AXIS: 1 DEGREES
EKG T AXIS: 27 DEGREES
EKG VENTRICULAR RATE: 69 BPM

## 2021-09-23 PROCEDURE — 93010 ELECTROCARDIOGRAM REPORT: CPT | Performed by: INTERNAL MEDICINE

## 2021-09-24 NOTE — CARE COORDINATION
CM met w/ pt during [address]. CM introduced self and explained role. Pt is from home with her  in multilevel home but will be able to stay on the main level. Pt has 3 steps to enter. Pt reports does not have any DME in the home currently. Pt is planning to have OP therapy at discharge. Pt will need a rolling walker prior to discharge. Catalina Diehl was evaluated today and a DME order was entered for a wheeled walker because she requires this to successfully complete daily living tasks of eating, bathing, toileting, personal cares, ambulating, grooming and hygiene. A wheeled walker is necessary due to the patient's unsteady gait, upper body weakness, and inability to  an ambulation device; and she can ambulate only by pushing a walker instead of a lesser assistive device such as a cane, crutch, or standard walker. The need for this equipment was discussed with the patient and she understands and is in agreement.

## 2021-09-29 ENCOUNTER — HOSPITAL ENCOUNTER (OUTPATIENT)
Age: 56
Setting detail: SPECIMEN
Discharge: HOME | End: 2021-09-29
Payer: COMMERCIAL

## 2021-09-29 PROCEDURE — U0005 INFEC AGEN DETEC AMPLI PROBE: HCPCS

## 2021-09-29 PROCEDURE — U0003 INFECTIOUS AGENT DETECTION BY NUCLEIC ACID (DNA OR RNA); SEVERE ACUTE RESPIRATORY SYNDROME CORONAVIRUS 2 (SARS-COV-2) (CORONAVIRUS DISEASE [COVID-19]), AMPLIFIED PROBE TECHNIQUE, MAKING USE OF HIGH THROUGHPUT TECHNOLOGIES AS DESCRIBED BY CMS-2020-01-R: HCPCS

## 2021-09-30 ENCOUNTER — TELEPHONE (OUTPATIENT)
Dept: ORTHOPEDIC SURGERY | Age: 56
End: 2021-09-30

## 2021-09-30 DIAGNOSIS — M17.12 PRIMARY OSTEOARTHRITIS OF LEFT KNEE: ICD-10-CM

## 2021-09-30 DIAGNOSIS — M17.11 PRIMARY OSTEOARTHRITIS OF RIGHT KNEE: Primary | ICD-10-CM

## 2021-10-01 LAB
SARS-COV-2: NOT DETECTED
SOURCE: NORMAL

## 2021-10-05 ENCOUNTER — ANESTHESIA EVENT (OUTPATIENT)
Dept: OPERATING ROOM | Age: 56
End: 2021-10-05
Payer: COMMERCIAL

## 2021-10-05 NOTE — ANESTHESIA PRE PROCEDURE
Department of Anesthesiology  Preprocedure Note       Name:  Aneudy Thakur   Age:  60 y.o.  :  1965                                          MRN:  [id number]         Date:  10/5/2021      Surgeon: Garfield Oh):  Saniya Metzger MD    Procedure: Procedure(s):  RIGHT KNEE TOTAL ARTHROPLASTY ROBOTIC    Medications prior to admission:   Prior to Admission medications    Medication Sig Start Date End Date Taking? Authorizing Provider   calcium carbonate (OYSTER SHELL CALCIUM 500 MG) 1250 (500 Ca) MG tablet Take by mouth    Historical Provider, MD   Cholecalciferol (VITAMIN D3) 1.25 MG (75330 UT) CAPS TAKE ONE CAPSULE BY MOUTH ONCE A WEEK 21   Historical Provider, MD   Cholecalciferol (VITAMIN D) 10 MCG (400 UNIT) CAPS Capsule Take 2 capsules by mouth    Historical Provider, MD   Fenoprofen Calcium 200 MG CAPS TAKE TWO TABLETS FOUR TIMES A DAY. 21   Historical Provider, MD   Ketoprofen 25 MG CAPS TAKE THREE TABLETS THREE TIMES A DAY 21   Historical Provider, MD   celecoxib (CELEBREX) 200 MG capsule Take 1 capsule by mouth daily 21   Saniya Metzger MD   celecoxib (CELEBREX) 200 MG capsule Take 1 capsule by mouth daily 21   Saniya Metzger MD       Current medications:    No current facility-administered medications for this encounter. Current Outpatient Medications   Medication Sig Dispense Refill    calcium carbonate (OYSTER SHELL CALCIUM 500 MG) 1250 (500 Ca) MG tablet Take by mouth      Cholecalciferol (VITAMIN D3) 1.25 MG (49813 UT) CAPS TAKE ONE CAPSULE BY MOUTH ONCE A WEEK      Cholecalciferol (VITAMIN D) 10 MCG (400 UNIT) CAPS Capsule Take 2 capsules by mouth      Fenoprofen Calcium 200 MG CAPS TAKE TWO TABLETS FOUR TIMES A DAY.       Ketoprofen 25 MG CAPS TAKE THREE TABLETS THREE TIMES A DAY      celecoxib (CELEBREX) 200 MG capsule Take 1 capsule by mouth daily 60 capsule 1    celecoxib (CELEBREX) 200 MG capsule Take 1 capsule by mouth daily 60 capsule 2       Allergies:  No Known Allergies    Problem List:    Patient Active Problem List   Diagnosis Code    Chest pain R07.9    Bilateral primary osteoarthritis of knee M17.0       Past Medical History:        Diagnosis Date    Arthritis        Past Surgical History:        Procedure Laterality Date    JOINT REPLACEMENT      scheduled 10/6/21 for right knee       Social History:    Social History     Tobacco Use    Smoking status: Never Smoker    Smokeless tobacco: Never Used   Substance Use Topics    Alcohol use: Yes     Comment: occasionally                                Counseling given: Not Answered      Vital Signs (Current): There were no vitals filed for this visit. BP Readings from Last 3 Encounters:   09/21/21 (!) 148/90       NPO Status:                                                                                 BMI:   Wt Readings from Last 3 Encounters:   09/21/21 154 lb 12.8 oz (70.2 kg)   09/22/21 150 lb (68 kg)   09/14/21 159 lb (72.1 kg)     There is no height or weight on file to calculate BMI.    CBC:   Lab Results   Component Value Date    WBC 5.5 09/21/2021    RBC 4.73 09/21/2021    HGB 12.5 09/21/2021    HCT 39.2 09/21/2021    MCV 82.9 09/21/2021    RDW 13.0 09/21/2021     09/21/2021       CMP:   Lab Results   Component Value Date     09/21/2021    K 4.1 09/21/2021     09/21/2021    CO2 25 09/21/2021    BUN 8 09/21/2021    CREATININE 0.6 09/21/2021    GFRAA >60 09/21/2021    LABGLOM >60 09/21/2021    GLUCOSE 107 09/21/2021    PROT 6.9 09/21/2021    CALCIUM 9.3 09/21/2021    BILITOT 0.2 09/21/2021    ALKPHOS 92 09/21/2021    AST 19 09/21/2021    ALT 18 09/21/2021       POC Tests: No results for input(s): POCGLU, POCNA, POCK, POCCL, POCBUN, POCHEMO, POCHCT in the last 72 hours.     Coags: No results found for: PROTIME, INR, APTT    HCG (If Applicable):   Lab Results   Component Value Date    PREGTESTUR NEGATIVE 02/03/2014        ABGs: No results found for: PHART, PO2ART, DTD4TEU, GEF9KWJ, BEART, D6XKQMOY     Type & Screen (If Applicable):  No results found for: LABABO, LABRH    Drug/Infectious Status (If Applicable):  No results found for: HIV, HEPCAB    COVID-19 Screening (If Applicable):   Lab Results   Component Value Date    COVID19 NOT DETECTED 09/29/2021           Anesthesia Evaluation  Patient summary reviewed  Airway: Mallampati: II        Dental:          Pulmonary: breath sounds clear to auscultation                             Cardiovascular:            Rhythm: regular                      Neuro/Psych:               GI/Hepatic/Renal:             Endo/Other:    (+) : arthritis: OA., .                 Abdominal:             Vascular: Other Findings:           Anesthesia Plan      general, MAC, spinal and regional     ASA 2       Induction: intravenous. MIPS: Postoperative opioids intended. Anesthetic plan and risks discussed with patient. Plan discussed with CRNA.     Attending anesthesiologist reviewed and agrees with Preprocedure content            VICTORIANO Segovia - CRNA   10/5/2021

## 2021-10-06 ENCOUNTER — HOSPITAL ENCOUNTER (OUTPATIENT)
Age: 56
Discharge: HOME | End: 2021-10-07
Attending: ORTHOPAEDIC SURGERY | Admitting: ORTHOPAEDIC SURGERY
Payer: COMMERCIAL

## 2021-10-06 ENCOUNTER — APPOINTMENT (OUTPATIENT)
Dept: GENERAL RADIOLOGY | Age: 56
End: 2021-10-06
Attending: ORTHOPAEDIC SURGERY
Payer: COMMERCIAL

## 2021-10-06 ENCOUNTER — ANESTHESIA (OUTPATIENT)
Dept: OPERATING ROOM | Age: 56
End: 2021-10-06
Payer: COMMERCIAL

## 2021-10-06 VITALS
SYSTOLIC BLOOD PRESSURE: 94 MMHG | TEMPERATURE: 98.2 F | RESPIRATION RATE: 16 BRPM | OXYGEN SATURATION: 96 % | DIASTOLIC BLOOD PRESSURE: 53 MMHG

## 2021-10-06 DIAGNOSIS — Z96.651 S/P TOTAL KNEE REPLACEMENT, RIGHT: Primary | ICD-10-CM

## 2021-10-06 PROBLEM — M17.11 PRIMARY OSTEOARTHRITIS OF RIGHT KNEE: Status: ACTIVE | Noted: 2021-10-06

## 2021-10-06 PROCEDURE — 6360000002 HC RX W HCPCS: Performed by: ORTHOPAEDIC SURGERY

## 2021-10-06 PROCEDURE — 2709999900 HC NON-CHARGEABLE SUPPLY: Performed by: ORTHOPAEDIC SURGERY

## 2021-10-06 PROCEDURE — 7100000000 HC PACU RECOVERY - FIRST 15 MIN: Performed by: ORTHOPAEDIC SURGERY

## 2021-10-06 PROCEDURE — 2500000003 HC RX 250 WO HCPCS: Performed by: NURSE ANESTHETIST, CERTIFIED REGISTERED

## 2021-10-06 PROCEDURE — 94761 N-INVAS EAR/PLS OXIMETRY MLT: CPT

## 2021-10-06 PROCEDURE — 3600000009 HC SURGERY ROBOT BASE: Performed by: ORTHOPAEDIC SURGERY

## 2021-10-06 PROCEDURE — 94664 DEMO&/EVAL PT USE INHALER: CPT

## 2021-10-06 PROCEDURE — 2580000003 HC RX 258: Performed by: ORTHOPAEDIC SURGERY

## 2021-10-06 PROCEDURE — 6370000000 HC RX 637 (ALT 250 FOR IP): Performed by: ORTHOPAEDIC SURGERY

## 2021-10-06 PROCEDURE — 3700000001 HC ADD 15 MINUTES (ANESTHESIA): Performed by: ORTHOPAEDIC SURGERY

## 2021-10-06 PROCEDURE — S2900 ROBOTIC SURGICAL SYSTEM: HCPCS | Performed by: ORTHOPAEDIC SURGERY

## 2021-10-06 PROCEDURE — 3600000019 HC SURGERY ROBOT ADDTL 15MIN: Performed by: ORTHOPAEDIC SURGERY

## 2021-10-06 PROCEDURE — 97116 GAIT TRAINING THERAPY: CPT

## 2021-10-06 PROCEDURE — 2720000010 HC SURG SUPPLY STERILE: Performed by: ORTHOPAEDIC SURGERY

## 2021-10-06 PROCEDURE — 2500000003 HC RX 250 WO HCPCS: Performed by: ORTHOPAEDIC SURGERY

## 2021-10-06 PROCEDURE — 7100000001 HC PACU RECOVERY - ADDTL 15 MIN: Performed by: ORTHOPAEDIC SURGERY

## 2021-10-06 PROCEDURE — 6360000002 HC RX W HCPCS: Performed by: NURSE ANESTHETIST, CERTIFIED REGISTERED

## 2021-10-06 PROCEDURE — C1713 ANCHOR/SCREW BN/BN,TIS/BN: HCPCS | Performed by: ORTHOPAEDIC SURGERY

## 2021-10-06 PROCEDURE — C1776 JOINT DEVICE (IMPLANTABLE): HCPCS | Performed by: ORTHOPAEDIC SURGERY

## 2021-10-06 PROCEDURE — 73560 X-RAY EXAM OF KNEE 1 OR 2: CPT

## 2021-10-06 PROCEDURE — 94150 VITAL CAPACITY TEST: CPT

## 2021-10-06 PROCEDURE — 27447 TOTAL KNEE ARTHROPLASTY: CPT | Performed by: ORTHOPAEDIC SURGERY

## 2021-10-06 PROCEDURE — 97162 PT EVAL MOD COMPLEX 30 MIN: CPT

## 2021-10-06 PROCEDURE — 20985 CPTR-ASST DIR MS PX: CPT | Performed by: ORTHOPAEDIC SURGERY

## 2021-10-06 PROCEDURE — 3700000000 HC ANESTHESIA ATTENDED CARE: Performed by: ORTHOPAEDIC SURGERY

## 2021-10-06 DEVICE — CEMENT BNE 40GM W/ GENT HI VISC RADPQ FOR REV SURG: Type: IMPLANTABLE DEVICE | Status: FUNCTIONAL

## 2021-10-06 RX ORDER — FENTANYL CITRATE 50 UG/ML
25 INJECTION, SOLUTION INTRAMUSCULAR; INTRAVENOUS EVERY 5 MIN PRN
Status: DISCONTINUED | OUTPATIENT
Start: 2021-10-06 | End: 2021-10-06 | Stop reason: HOSPADM

## 2021-10-06 RX ORDER — HYDRALAZINE HYDROCHLORIDE 20 MG/ML
5 INJECTION INTRAMUSCULAR; INTRAVENOUS EVERY 10 MIN PRN
Status: DISCONTINUED | OUTPATIENT
Start: 2021-10-06 | End: 2021-10-06 | Stop reason: HOSPADM

## 2021-10-06 RX ORDER — FENTANYL CITRATE 50 UG/ML
50 INJECTION, SOLUTION INTRAMUSCULAR; INTRAVENOUS EVERY 5 MIN PRN
Status: DISCONTINUED | OUTPATIENT
Start: 2021-10-06 | End: 2021-10-06 | Stop reason: HOSPADM

## 2021-10-06 RX ORDER — ONDANSETRON 4 MG/1
4 TABLET, ORALLY DISINTEGRATING ORAL EVERY 8 HOURS PRN
Status: DISCONTINUED | OUTPATIENT
Start: 2021-10-06 | End: 2021-10-07 | Stop reason: HOSPADM

## 2021-10-06 RX ORDER — OXYCODONE HYDROCHLORIDE AND ACETAMINOPHEN 5; 325 MG/1; MG/1
1 TABLET ORAL EVERY 4 HOURS PRN
Status: DISCONTINUED | OUTPATIENT
Start: 2021-10-06 | End: 2021-10-07 | Stop reason: HOSPADM

## 2021-10-06 RX ORDER — OXYCODONE HYDROCHLORIDE AND ACETAMINOPHEN 5; 325 MG/1; MG/1
2 TABLET ORAL EVERY 4 HOURS PRN
Status: DISCONTINUED | OUTPATIENT
Start: 2021-10-06 | End: 2021-10-07 | Stop reason: HOSPADM

## 2021-10-06 RX ORDER — FENTANYL CITRATE 50 UG/ML
INJECTION, SOLUTION INTRAMUSCULAR; INTRAVENOUS PRN
Status: DISCONTINUED | OUTPATIENT
Start: 2021-10-06 | End: 2021-10-06 | Stop reason: SDUPTHER

## 2021-10-06 RX ORDER — MIDAZOLAM HYDROCHLORIDE 1 MG/ML
INJECTION INTRAMUSCULAR; INTRAVENOUS PRN
Status: DISCONTINUED | OUTPATIENT
Start: 2021-10-06 | End: 2021-10-06 | Stop reason: SDUPTHER

## 2021-10-06 RX ORDER — ONDANSETRON 2 MG/ML
4 INJECTION INTRAMUSCULAR; INTRAVENOUS
Status: DISCONTINUED | OUTPATIENT
Start: 2021-10-06 | End: 2021-10-06 | Stop reason: HOSPADM

## 2021-10-06 RX ORDER — CALCIUM CARBONATE 500(1250)
500 TABLET ORAL DAILY
Status: DISCONTINUED | OUTPATIENT
Start: 2021-10-06 | End: 2021-10-07 | Stop reason: HOSPADM

## 2021-10-06 RX ORDER — SODIUM CHLORIDE 9 MG/ML
25 INJECTION, SOLUTION INTRAVENOUS PRN
Status: DISCONTINUED | OUTPATIENT
Start: 2021-10-06 | End: 2021-10-07 | Stop reason: HOSPADM

## 2021-10-06 RX ORDER — CEFAZOLIN SODIUM 2 G/100ML
2000 INJECTION, SOLUTION INTRAVENOUS EVERY 8 HOURS
Status: COMPLETED | OUTPATIENT
Start: 2021-10-06 | End: 2021-10-07

## 2021-10-06 RX ORDER — OMEGA-3S/DHA/EPA/FISH OIL/D3 300MG-1000
800 CAPSULE ORAL DAILY
Status: DISCONTINUED | OUTPATIENT
Start: 2021-10-06 | End: 2021-10-07 | Stop reason: HOSPADM

## 2021-10-06 RX ORDER — TRANEXAMIC ACID 10 MG/ML
INJECTION, SOLUTION INTRAVENOUS PRN
Status: DISCONTINUED | OUTPATIENT
Start: 2021-10-06 | End: 2021-10-06 | Stop reason: SDUPTHER

## 2021-10-06 RX ORDER — ACETAMINOPHEN 325 MG/1
650 TABLET ORAL EVERY 4 HOURS PRN
Status: DISCONTINUED | OUTPATIENT
Start: 2021-10-06 | End: 2021-10-07 | Stop reason: HOSPADM

## 2021-10-06 RX ORDER — LABETALOL HYDROCHLORIDE 5 MG/ML
5 INJECTION, SOLUTION INTRAVENOUS EVERY 10 MIN PRN
Status: DISCONTINUED | OUTPATIENT
Start: 2021-10-06 | End: 2021-10-06 | Stop reason: HOSPADM

## 2021-10-06 RX ORDER — SODIUM CHLORIDE, SODIUM LACTATE, POTASSIUM CHLORIDE, CALCIUM CHLORIDE 600; 310; 30; 20 MG/100ML; MG/100ML; MG/100ML; MG/100ML
INJECTION, SOLUTION INTRAVENOUS CONTINUOUS
Status: DISCONTINUED | OUTPATIENT
Start: 2021-10-06 | End: 2021-10-07

## 2021-10-06 RX ORDER — SODIUM CHLORIDE, SODIUM LACTATE, POTASSIUM CHLORIDE, CALCIUM CHLORIDE 600; 310; 30; 20 MG/100ML; MG/100ML; MG/100ML; MG/100ML
INJECTION, SOLUTION INTRAVENOUS CONTINUOUS
Status: DISCONTINUED | OUTPATIENT
Start: 2021-10-06 | End: 2021-10-06

## 2021-10-06 RX ORDER — KETOROLAC TROMETHAMINE 30 MG/ML
INJECTION, SOLUTION INTRAMUSCULAR; INTRAVENOUS
Status: COMPLETED | OUTPATIENT
Start: 2021-10-06 | End: 2021-10-06

## 2021-10-06 RX ORDER — SODIUM CHLORIDE 0.9 % (FLUSH) 0.9 %
10 SYRINGE (ML) INJECTION EVERY 12 HOURS SCHEDULED
Status: DISCONTINUED | OUTPATIENT
Start: 2021-10-06 | End: 2021-10-07 | Stop reason: HOSPADM

## 2021-10-06 RX ORDER — ONDANSETRON 2 MG/ML
4 INJECTION INTRAMUSCULAR; INTRAVENOUS EVERY 6 HOURS PRN
Status: DISCONTINUED | OUTPATIENT
Start: 2021-10-06 | End: 2021-10-07 | Stop reason: HOSPADM

## 2021-10-06 RX ORDER — SENNA PLUS 8.6 MG/1
1 TABLET ORAL DAILY PRN
Status: DISCONTINUED | OUTPATIENT
Start: 2021-10-06 | End: 2021-10-07 | Stop reason: HOSPADM

## 2021-10-06 RX ORDER — BUPIVACAINE HYDROCHLORIDE AND EPINEPHRINE 5; 5 MG/ML; UG/ML
INJECTION, SOLUTION EPIDURAL; INTRACAUDAL; PERINEURAL
Status: COMPLETED | OUTPATIENT
Start: 2021-10-06 | End: 2021-10-06

## 2021-10-06 RX ORDER — PROPOFOL 10 MG/ML
INJECTION, EMULSION INTRAVENOUS PRN
Status: DISCONTINUED | OUTPATIENT
Start: 2021-10-06 | End: 2021-10-06 | Stop reason: SDUPTHER

## 2021-10-06 RX ORDER — DEXAMETHASONE SODIUM PHOSPHATE 4 MG/ML
INJECTION, SOLUTION INTRA-ARTICULAR; INTRALESIONAL; INTRAMUSCULAR; INTRAVENOUS; SOFT TISSUE PRN
Status: DISCONTINUED | OUTPATIENT
Start: 2021-10-06 | End: 2021-10-06 | Stop reason: SDUPTHER

## 2021-10-06 RX ORDER — SODIUM CHLORIDE 0.9 % (FLUSH) 0.9 %
10 SYRINGE (ML) INJECTION PRN
Status: DISCONTINUED | OUTPATIENT
Start: 2021-10-06 | End: 2021-10-07 | Stop reason: HOSPADM

## 2021-10-06 RX ORDER — FERROUS SULFATE 325(65) MG
325 TABLET ORAL
COMMUNITY

## 2021-10-06 RX ADMIN — PHENYLEPHRINE HYDROCHLORIDE 100 MCG: 10 INJECTION INTRAVENOUS at 10:19

## 2021-10-06 RX ADMIN — SODIUM CHLORIDE, POTASSIUM CHLORIDE, SODIUM LACTATE AND CALCIUM CHLORIDE: 600; 310; 30; 20 INJECTION, SOLUTION INTRAVENOUS at 08:41

## 2021-10-06 RX ADMIN — PHENYLEPHRINE HYDROCHLORIDE 200 MCG: 10 INJECTION INTRAVENOUS at 09:33

## 2021-10-06 RX ADMIN — FENTANYL CITRATE 50 MCG: 50 INJECTION, SOLUTION INTRAMUSCULAR; INTRAVENOUS at 09:12

## 2021-10-06 RX ADMIN — CEFAZOLIN SODIUM 2000 MG: 2 INJECTION, SOLUTION INTRAVENOUS at 17:08

## 2021-10-06 RX ADMIN — MIDAZOLAM 1 MG: 1 INJECTION INTRAMUSCULAR; INTRAVENOUS at 09:12

## 2021-10-06 RX ADMIN — PHENYLEPHRINE HYDROCHLORIDE 100 MCG: 10 INJECTION INTRAVENOUS at 10:37

## 2021-10-06 RX ADMIN — TRANEXAMIC ACID 1 G: 10 INJECTION, SOLUTION INTRAVENOUS at 09:32

## 2021-10-06 RX ADMIN — SODIUM CHLORIDE, POTASSIUM CHLORIDE, SODIUM LACTATE AND CALCIUM CHLORIDE: 600; 310; 30; 20 INJECTION, SOLUTION INTRAVENOUS at 12:27

## 2021-10-06 RX ADMIN — CHOLECALCIFEROL (VITAMIN D3) 10 MCG (400 UNIT) TABLET 800 UNITS: at 17:13

## 2021-10-06 RX ADMIN — CEFAZOLIN 1000 MG: 1 INJECTION, POWDER, FOR SOLUTION INTRAMUSCULAR; INTRAVENOUS; PARENTERAL at 09:22

## 2021-10-06 RX ADMIN — MIDAZOLAM 1 MG: 1 INJECTION INTRAMUSCULAR; INTRAVENOUS at 09:22

## 2021-10-06 RX ADMIN — PHENYLEPHRINE HYDROCHLORIDE 100 MCG: 10 INJECTION INTRAVENOUS at 10:45

## 2021-10-06 RX ADMIN — PHENYLEPHRINE HYDROCHLORIDE 200 MCG: 10 INJECTION INTRAVENOUS at 10:07

## 2021-10-06 RX ADMIN — CALCIUM 500 MG: 500 TABLET ORAL at 17:13

## 2021-10-06 RX ADMIN — PHENYLEPHRINE HYDROCHLORIDE 200 MCG: 10 INJECTION INTRAVENOUS at 09:53

## 2021-10-06 RX ADMIN — SODIUM CHLORIDE, POTASSIUM CHLORIDE, SODIUM LACTATE AND CALCIUM CHLORIDE: 600; 310; 30; 20 INJECTION, SOLUTION INTRAVENOUS at 10:06

## 2021-10-06 RX ADMIN — DEXAMETHASONE SODIUM PHOSPHATE 8 MG: 4 INJECTION, SOLUTION INTRAMUSCULAR; INTRAVENOUS at 09:28

## 2021-10-06 RX ADMIN — PROPOFOL 50 MG: 10 INJECTION, EMULSION INTRAVENOUS at 09:22

## 2021-10-06 RX ADMIN — ASPIRIN 325 MG: 325 TABLET, COATED ORAL at 17:13

## 2021-10-06 RX ADMIN — Medication 10 ML: at 21:53

## 2021-10-06 RX ADMIN — CEFAZOLIN SODIUM 2000 MG: 2 INJECTION, SOLUTION INTRAVENOUS at 23:55

## 2021-10-06 RX ADMIN — PHENYLEPHRINE HYDROCHLORIDE 100 MCG: 10 INJECTION INTRAVENOUS at 09:42

## 2021-10-06 RX ADMIN — PHENYLEPHRINE HYDROCHLORIDE 200 MCG: 10 INJECTION INTRAVENOUS at 10:03

## 2021-10-06 RX ADMIN — OXYCODONE AND ACETAMINOPHEN 2 TABLET: 5; 325 TABLET ORAL at 21:51

## 2021-10-06 RX ADMIN — PHENYLEPHRINE HYDROCHLORIDE 200 MCG: 10 INJECTION INTRAVENOUS at 10:28

## 2021-10-06 RX ADMIN — PROPOFOL 300 MG: 10 INJECTION, EMULSION INTRAVENOUS at 09:24

## 2021-10-06 ASSESSMENT — PAIN SCALES - GENERAL
PAINLEVEL_OUTOF10: 0
PAINLEVEL_OUTOF10: 7
PAINLEVEL_OUTOF10: 0
PAINLEVEL_OUTOF10: 1
PAINLEVEL_OUTOF10: 0
PAINLEVEL_OUTOF10: 2
PAINLEVEL_OUTOF10: 0
PAINLEVEL_OUTOF10: 1
PAINLEVEL_OUTOF10: 0

## 2021-10-06 ASSESSMENT — PULMONARY FUNCTION TESTS
PIF_VALUE: 0
PIF_VALUE: 1
PIF_VALUE: 0
PIF_VALUE: 0
PIF_VALUE: 1
PIF_VALUE: 0
PIF_VALUE: 1
PIF_VALUE: 0
PIF_VALUE: 1
PIF_VALUE: 0
PIF_VALUE: 1
PIF_VALUE: 0
PIF_VALUE: 1
PIF_VALUE: 0
PIF_VALUE: 1
PIF_VALUE: 0
PIF_VALUE: 1
PIF_VALUE: 0
PIF_VALUE: 1
PIF_VALUE: 0
PIF_VALUE: 1
PIF_VALUE: 0
PIF_VALUE: 1
PIF_VALUE: 1
PIF_VALUE: 0
PIF_VALUE: 1
PIF_VALUE: 0
PIF_VALUE: 1
PIF_VALUE: 0
PIF_VALUE: 1

## 2021-10-06 ASSESSMENT — PAIN DESCRIPTION - ORIENTATION
ORIENTATION: RIGHT

## 2021-10-06 ASSESSMENT — PAIN DESCRIPTION - PAIN TYPE
TYPE: SURGICAL PAIN

## 2021-10-06 ASSESSMENT — PAIN - FUNCTIONAL ASSESSMENT
PAIN_FUNCTIONAL_ASSESSMENT: 0-10
PAIN_FUNCTIONAL_ASSESSMENT: PREVENTS OR INTERFERES SOME ACTIVE ACTIVITIES AND ADLS

## 2021-10-06 ASSESSMENT — PAIN DESCRIPTION - LOCATION
LOCATION: KNEE

## 2021-10-06 ASSESSMENT — PAIN DESCRIPTION - ONSET: ONSET: ON-GOING

## 2021-10-06 ASSESSMENT — PAIN DESCRIPTION - PROGRESSION: CLINICAL_PROGRESSION: NOT CHANGED

## 2021-10-06 ASSESSMENT — PAIN DESCRIPTION - DESCRIPTORS: DESCRIPTORS: ACHING

## 2021-10-06 ASSESSMENT — PAIN DESCRIPTION - FREQUENCY: FREQUENCY: CONTINUOUS

## 2021-10-06 NOTE — H&P
Chief Complaint   Patient presents with    Knee Pain       bilateral knee          History of Present Illness:                             Pietro Chavez is a 60 y.o. female who presents today for further discussion of her ongoing bilateral right worse than left knee pain. She has ongoing swelling and stiffness at the knees and difficulty with standing and walking throughout the day. Viscosupplementation injections performed earlier this year provided some temporary relief of her symptoms but this was short-lived and lasted only 2 to 3 months. She is interested in discussing further treatments and feels that given the severity of her pain and dysfunction that she would like to discuss the possibility of surgical intervention for knee replacement of the right knee. Pain is more severe on the right than the left but symptoms are similar bilaterally.     She complains of deep aching pain in the medial joint line of bilateral knees which is worse with standing and walking but she does have pain even at rest and while sleeping at night. She feels stiffness and weakness with the knees and is afraid that they may give out or cause her to fall. She wears braces to help with support but continues to move slowly and cannot exercise or be active on a daily basis the way she would like. She has failed medication therapy, home exercise program, injection therapy, bracing, and activity modification.        Patient presents to the office today for fu of the bilateral knees. Pt states pain today is a 5/10 in the bilateral knees. Pt states that pain will increase with prolong walking and standing. Pt states that she is having a difficult time with going up and down the stairs. Pt states she is having to wear her braces do to the pain.  Pt states that she is very stiff in the morning and her knees are locking on her.         Medical History  Patient's medications, allergies, past medical, surgical, social and family histories were reviewed and updated as appropriate.     Past Medical History   No past medical history on file. Past Surgical History   No past surgical history on file. Family History   No family history on file. Social History   Social History      Socioeconomic History    Marital status:        Spouse name: Antoni Santizo Number of children: 2    Years of education: 12    Highest education level: None   Occupational History    None   Tobacco Use    Smoking status: Never Smoker    Smokeless tobacco: Never Used   Substance and Sexual Activity    Alcohol use: Yes       Comment: occasionally    Drug use: No    Sexual activity: Yes       Partners: Male   Other Topics Concern    None   Social History Narrative    None      Social Determinants of Health          Financial Resource Strain:     Difficulty of Paying Living Expenses:    Food Insecurity:     Worried About Running Out of Food in the Last Year:     Ran Out of Food in the Last Year:    Transportation Needs:     Lack of Transportation (Medical):      Lack of Transportation (Non-Medical):    Physical Activity:     Days of Exercise per Week:     Minutes of Exercise per Session:    Stress:     Feeling of Stress :    Social Connections:     Frequency of Communication with Friends and Family:     Frequency of Social Gatherings with Friends and Family:     Attends Hinduism Services:     Active Member of Clubs or Organizations:     Attends Club or Organization Meetings:     Marital Status:    Intimate Partner Violence:     Fear of Current or Ex-Partner:     Emotionally Abused:     Physically Abused:     Sexually Abused:          Current Facility-Administered Medications          Current Outpatient Medications   Medication Sig Dispense Refill    calcium carbonate (OYSTER SHELL CALCIUM 500 MG) 1250 (500 Ca) MG tablet Take by mouth        Cholecalciferol (VITAMIN D3) 1.25 MG (47078 UT) CAPS TAKE ONE CAPSULE BY MOUTH ONCE A WEEK        Cholecalciferol (VITAMIN D) 10 MCG (400 UNIT) CAPS Capsule Take 2 capsules by mouth        Fenoprofen Calcium 200 MG CAPS TAKE TWO TABLETS FOUR TIMES A DAY.        Ketoprofen 25 MG CAPS TAKE THREE TABLETS THREE TIMES A DAY        celecoxib (CELEBREX) 200 MG capsule Take 1 capsule by mouth daily 60 capsule 1    celecoxib (CELEBREX) 200 MG capsule Take 1 capsule by mouth daily 60 capsule 2    celecoxib (CELEBREX) 200 MG capsule Take 1 capsule by mouth daily 60 capsule 0      No current facility-administered medications for this visit.         No Known Allergies        Review of Systems   Constitutional: Negative for chills and fever. HENT: Negative for congestion and sneezing. Eyes: Negative for pain and redness. Respiratory: Negative for chest tightness, shortness of breath and wheezing. Cardiovascular: Negative for chest pain and palpitations. Gastrointestinal: Negative for vomiting. Musculoskeletal: Positive for arthralgias. Skin: Negative for color change and rash. Neurological: Negative for weakness and numbness. Psychiatric/Behavioral: Negative for agitation. The patient is not nervous/anxious.                                                  Examination:  General Exam:  Vitals: Pulse 95   Resp 15   Ht 5' 1\" (1.549 m)   Wt 159 lb (72.1 kg)   SpO2 98%   BMI 30.04 kg/m²    Physical Exam  Vitals and nursing note reviewed. Constitutional:       Appearance: Normal appearance. HENT:      Head: Normocephalic and atraumatic. Eyes:      Conjunctiva/sclera: Conjunctivae normal.      Pupils: Pupils are equal, round, and reactive to light. Pulmonary:      Effort: Pulmonary effort is normal.   Musculoskeletal:      Cervical back: Normal range of motion. Right hip: No deformity, tenderness, bony tenderness or crepitus. Normal range of motion. Normal strength. Left hip: Normal.      Left knee: No swelling, deformity, effusion, ecchymosis or lacerations.  Normal range of motion. No tenderness. No medial joint line or lateral joint line tenderness. No LCL laxity or MCL laxity. Normal alignment and normal meniscus. Comments: Right Lower Extremity:    There is moderate to severe tenderness to palpation diffusely throughout the knee, most significant along the medial joint line. There is a moderate knee joint effusion present and mild global swelling anteriorly. Mild restricted range of motion at the knee with approximately 5 degrees lack of full extension and knee flexion up to 120 degrees with pain at the extremes of motion. There is mild crepitation during active range of motion. There is mild varus knee alignment. There is 5 out of 5 strength with knee flexion and extension. There is no instability to varus or valgus stress testing and anterior and posterior drawer testing. Sensation is intact to light touch throughout the lower extremity. There is positive medial James's test with tenderness to palpation and pain along the medial joint line. Skin is intact. Pulses are intact    No pain with active range of motion of the hip. Strength and range of motion of the hip are intact. No tenderness to palpation at the hip. Left Lower Extremity:  There is moderate tenderness to palpation throughout the knee most significant along the medial joint line. There is a mild effusion present and mild global swelling at the knee anteriorly. Mild restricted range of motion at the knee with approximately 3 degrees short of full extension and knee flexion up to 130 degrees with pain at the extremes of motion. There is mild crepitation at the knee during active range of motion. There is mild varus knee alignment. There is 5 out of 5 strength with knee flexion and extension. There is no instability to varus or valgus stress testing or anterior and posterior drawer testing. Sensation is intact to light touch throughout the lower extremity.   There is a positive James's test with tenderness to palpation and pain along the medial joint line. Skin is intact. Pulses intact    No pain with active range of motion of the hip. Strength and range of motion of the hip are intact. No tenderness to palpation at the hip. Skin:     General: Skin is warm and dry. Neurological:      Mental Status: She is alert and oriented to person, place, and time. Psychiatric:         Mood and Affect: Mood normal.         Behavior: Behavior normal.               Diagnostic testing:  X-ray images were reviewed by myself and discussed with the patient:     Narrative   4 views of the Right Knee:       There is evidence of severe degenerative changes present in all 3    compartments of the knee most significant along the medial compartment    where there is advanced joint space collapse and bone on bone articulation    with prominent osteophyte formation.  There is subchondral sclerosis    present in medial compartment with cortical irregularities on both sides    of the joint.  There is a moderate varus alignment present.  There are    prominent arthritic changes in the patellofemoral joint with joint space    narrowing and osteophyte formation.  Normal bone density is present.  Mild    soft tissue swelling present at the knee joint.  No fractures or loose    bodies identified.  Mild decreased bone mineral density       Impression:   Severe varus tricompartmental arthritis                  Office Procedures:  Assessment and Plan  1. Bilateral right worse than left knee primary osteoarthritis     We discussed the severity of her degenerative joint disease of the bilateral knees right worse than left. She has been through extensive conservative treatments in the past and feels that her right knee is becoming more severe despite these treatments.   She is having severe difficulties on a daily basis and would like to discuss moving forward with right total knee replacement.     She has similar but less severe symptoms in the left knee. I recommended of the address only one knee replacement at a time. I have recommended we proceed with injection therapy at the last symptomatic left knee to help alleviate some of her symptoms and address her left knee replacement when she is sufficiently healed from right knee surgery.      We discussed the severity of the degenerative findings on both on the x-rays and physical exam.  The patient feels that they have exhausted conservative measures with respect to the right knee. The patient has previously tried injections, therapy, over-the-counter pain medications, and activity modification. The pain level is severe and bothers the patient on a daily basis, including interrupting sleep at night. Activities of daily living are difficult to perform. The patient has trouble getting dressed, getting up from a seated position, climbing stairs, and has fear of falling.     The pain and dysfunction at the knee are severely limiting at this stage and the patient would like to consider surgical intervention.     I have recommended surgical intervention for a right total knee replacement. We discussed the risks, benefits, and alternatives to surgery. We discussed the intended benefits from a well-functioning replacement and the anticipated recovery process. We discussed potential risks and complications including but not limited to DVT/PE, infection, stiffness, loosening, and fracture. We discussed pain control, physical therapy, and anticoagulation during the perioperative timeframe.     The patient would like to proceed with knee replacement surgery and will be enrolled in the joint replacement class     Plan will be to proceed with a robotic assisted right total knee replacement. She will be on aspirin postoperatively for DVT prophylaxis.   Plan will be for her to spend the night overnight in the hospital and be discharged home the next day if she is doing well with therapy. History and Physical exam note from the office visit is copied above from epic. I have reviewed the note and examined the patient and find no relevant changes.      I have reviewed with the patient and/or family the risks, benefits, and alternatives to the procedure as well as expected postoperative course    Soraya Palomares MD

## 2021-10-06 NOTE — OP NOTE
Operative Note      Patient: Hannah Nuñez  YOB: 1965  MRN: [id number]    Date of Procedure: 10/6/2021    Pre-Op Diagnosis: PRIMARY OA OF RIGHT KNEE    Post-Op Diagnosis: Same       Procedure(s):  RIGHT KNEE TOTAL ARTHROPLASTY ROBOTIC    Surgeon(s):  Dominga Lakhani MD    Assistant:   * No surgical staff found *    Anesthesia: Spinal    Estimated Blood Loss (mL): less than 585     Complications: None    Specimens:   * No specimens in log *    Implants:  Implant Name Type Inv. Item Serial No.  Lot No. LRB No. Used Action   CEMENT BNE 40GM W/ GENT HI VISC RADPQ FOR REV SURG  CEMENT BNE 40GM W/ GENT HI VISC RADPQ FOR REV SURG  [employer]- [id number] Right 1 Implanted   CEMENT BNE 40GM W/ GENT HI VISC RADPQ FOR REV SURG  CEMENT BNE 40GM W/ GENT HI VISC RADPQ FOR REV SURG  [employer]- [id number] Right 1 Implanted   COMPONENT FEM SZ 2 R ANT KNEE CRUCE RET JULITO REFERENCING  COMPONENT FEM SZ 2 R ANT KNEE CRUCE RET JULITO REFERENCING  [employer] [facility] [id number] Right 1 Implanted   BASEPLATE TIB SZ 2 KNEE TRITANIUM JULITO YAYA LO PROF TRIATHLON  BASEPLATE TIB SZ 2 KNEE TRITANIUM JULITO YAYA LO PROF TRIATHLON  [employer] [facility] [id number] Right 1 Implanted   IMPLANT PATELLAR [id number] [id number] X3 ASYMMETRIC TRIATHLON  IMPLANT PATELLAR [id number] THK9MM X3 ASYMMETRIC TRIATHLON  [employer] [facility] [id number] Right 1 Implanted   TIBIAL BEAR INSRT - CR  TIBIAL BEAR INSRT - CR  [employer] [facility] [id number] Right 1 Implanted         Drains: * No LDAs found *    Findings:     Detailed Description of Procedure:   Implants:  Clarence triathlon cemented total knee replacement size 2  CR femoral component, size 2 tibial baseplate, size 50QQ CR articular surface liner, and a size 29 asymmetric polyethylene patella    The patient was identified in the preoperative area and the site was marked.   The patient was taken back to the operating room placed supine on the operative table.  The above anesthesia was initiated. The lower extremity was prepped and draped in sterile fashion with a thigh tourniquet. Timeout was performed and preoperative antibiotics were given. Leg was exsanguinated with an Esmarch and tourniquet was inflated to 325 mmHg and deflated at the conclusion of the case during wound closure after less than 100 minutes of tourniquet time. A midline incision was made over the knee and dissection was carried down through subcutaneous tissues and bleeding was controlled with the Bovie. A medial parapatellar arthrotomy was performed to gain access to the knee joint. There was evidence of tricompartmental extensive degenerative joint disease. Release was performed along the deep fibers of the MCL at the medial tibia. ACL was resected. Portions of the fat pad were excised. Pins were placed at the femur and tibia for the robotic arrays. The center of the hip and ankle were identified with the robotic system. The blue probe was used to identify appropriate data points for identifying the anatomy of the femur and tibia. Using the robotic information, the knee was balanced in flexion and extension with appropriate positioning of the trial components using the computer program.    The robotic arm was brought into the field and bony cuts were made at the distal femur and proximal tibia. Excess bone was removed and the medial lateral meniscus were excised. The PCL was protected and intact. Trial of tibial component was placed and pinned in the appropriate rotation. Trial CR femoral component was impacted into place as well. Trial CR articular surface liners were placed in the knee and the knee was brought through range of motion and stress examination. The size 10mm CR articular surface liner provided the knee with excellent stability full range of motion with no tightness in flexion. The patella was resurfaced with a cutting guide.   A size 29 asymmetric patella trial component was inserted. The knee was again taken through range of motion and there was excellent patellofemoral tracking. The trial components were removed. The knee was thoroughly irrigated with the pulse lavage. Cement was mixed and applied to the proximal tibia and distal femur. The size 2 tibial component was impacted into place along with the size 2 femoral component, and excess cement was removed. The trial liner was inserted and the knee was brought into full extension while the cement hardened. The size 29 asymmetric polyethylene patella was cemented into place as well. The knee was trialed again and the size 10mm CR articular surface liner provided excellent stability and range of motion with improved alignment of the knee. The liner was then implanted. The knee was injected with 30 mL of half percent Marcaine with epinephrine and 30 mg of Toradol at the arthrotomy site and joint capsule. The tourniquet was deflated and bleeding was well controlled with the Bovie. The medial parapatellar arthrotomy was closed with a #1 strata fix suture. Deep subcutaneous layers were closed with buried 2-0 Vicryl. Skin was then closed with a running 3-0 strata fix subcuticular stitch followed by a Prineo Dermabond dressing. A sterile bandage was applied with 4 x 8's, ABD, sterile web roll, ice therapy pad and an Ace wrap. The patient was awakened and taken to PACU in stable condition. All sponge and needle counts were correct. The patient will be admitted to the hospital for pain control, physical therapy, and medical monitoring. The patient will be on chemical DVT prophylaxis and will be weightbearing as tolerated.     Electronically signed by Bharath Kidd MD on 10/6/2021 at 11:35 AM

## 2021-10-06 NOTE — PROGRESS NOTES
Physical Therapy    Facility/Department: [facility]  Initial Assessment    NAME: Aneudy Thakur  : 1965  MRN: [id number]    Date of Service: 10/6/2021    Discharge Recommendations:  Outpatient PT, 24 hour supervision or assist   PT Equipment Recommendations  Equipment Needed: Yes  Mobility Devices: Gifty Inches: Rolling     Functional Outcome Measure:    Acute Care Index of Function (ACIF):    Score: 0.87 (0.71 or greater = appropriate for home with outpatient PT and 24 hour supervision/assist)      Assessment   Assessment: Pt is a 60 y.o. female with medical history, surgical history, co-morbidities, and personal factors including Arthritis and joint replacement with admission for R knee primary OA and s/p R TKA. Prior to admission, pt was independent with functional mobility and ADLs. Examination of body systems reveals decreased strength and decreased independence with functional mobility. Prognosis: Good  Decision Making: Medium Complexity  Clinical Presentation: evolving  PT Education: Goals;PT Role;Plan of Care;Equipment; Functional Mobility Training;Transfer Training;Gait Training;General Safety; Adaptive Device Training;Weight-bearing Education  REQUIRES PT FOLLOW UP: Yes  Activity Tolerance  Activity Tolerance: Patient Tolerated treatment well         Restrictions  Restrictions/Precautions  Restrictions/Precautions: General Precautions, Weight Bearing  Lower Extremity Weight Bearing Restrictions  Right Lower Extremity Weight Bearing: Weight Bearing As Tolerated  Vision/Hearing  Vision: Within Functional Limits  Hearing: Within functional limits     Subjective  General  Chart Reviewed: Yes  Patient assessed for rehabilitation services?: Yes  Family / Caregiver Present: No  Follows Commands: Within Functional Limits  Pain Screening  Patient Currently in Pain: Yes  Pain Assessment  Pain Assessment: 0-10  Pain Level: 1  Pain Type: Surgical pain  Pain Location: Knee  Pain Orientation: Right  Vital Signs  Patient Currently in Pain: Yes       Orientation  Orientation  Overall Orientation Status: Within Normal Limits  Social/Functional History  Social/Functional History  Lives With: Spouse  Type of Home: House  Home Layout: One level  Home Access: Stairs to enter with rails  Entrance Stairs - Number of Steps: 3  Bathroom Shower/Tub: Tub/Shower unit  Home Equipment: 4 wheeled walker  ADL Assistance: Independent  Homemaking Assistance: Independent  Ambulation Assistance: Independent  Transfer Assistance: Independent  Cognition   Cognition  Overall Cognitive Status: WNL    Objective             Strength RLE  Comment: knee extension: at least 3+/5 observed functionally; ankle DF: 5/5  Strength LLE  Comment: knee extension: 5/5, ankle DF: 5/5     Sensation  Overall Sensation Status: WNL  Bed mobility  Supine to Sit: Independent  Sit to Supine: Independent  Transfers  Sit to Stand: Independent  Stand to sit: Independent  Ambulation  Ambulation?: Yes  Ambulation 1  Surface: level tile  Device: Rolling Walker  Assistance: Stand by assistance  Quality of Gait: decreased gait speed, decreased step length bilaterally, decreased stance time on RLE, antalgic  Distance: 100 feet + 150 feet  Comments: with initial verbal cues for BLE placement, walker placement, and sequence throughout ambulation; with initial verbal cues for directions in order to successfully navigate hallway and return to correct room  Stairs/Curb  Stairs?: Yes  Stairs  # Steps : 2  Rails: Bilateral  Assistance: Stand by assistance  Comment: with initial verbal cues to use \"up with the good/down with the bad\" stair negotiation technique     Balance  Posture: Fair  Sitting - Static: Good  Sitting - Dynamic: Good  Standing - Static: Good  Standing - Dynamic: Good (with front wheeled walker)        Gait Training:  Cues were given for safety, sequence, device management, balance, posture, awareness, path.           Plan   Plan  Times per week: 7 BID  Current Treatment Recommendations: Strengthening, ROM, Balance Training, Functional Mobility Training, Transfer Training, ADL/Self-care Training, Gait Training, Patient/Caregiver Education & Training, IADL Training, Stair training, Equipment Evaluation, Education, & procurement, Neuromuscular Re-education, Pain Management, Home Exercise Program, Positioning, Endurance Training, Safety Education & Training  Safety Devices  Type of devices: All fall risk precautions in place, Left in chair, Call light within reach, Chair alarm in place, Nurse notified, Gait belt      AM-PAC Score  AM-PAC Inpatient Mobility Raw Score : 22 (10/06/21 1629)  AM-PAC Inpatient T-Scale Score : 53.28 (10/06/21 1629)  Mobility Inpatient CMS 0-100% Score: 20.91 (10/06/21 1629)  Mobility Inpatient CMS G-Code Modifier : Meme Deng (10/06/21 1629)          Goals  Long term goals  Time Frame for Long term goals :  In one week:  Long term goal 1: Pt will ambulate 500 feet with modified independence with LRAD  Long term goal 2: Pt will independently ascend/descend 6 steps with a handrail  Long term goal 3: Pt will independently complete 3 sets of 10 reps of BLE AROM exercises in available and allowed ROM       Time In: 1550  Time Out: 1615  Total Treatment Time: 25  Timed Code Treatment Minutes: [id number] Cb Lee PT, DPT  License #: [id number]

## 2021-10-06 NOTE — ANESTHESIA PROCEDURE NOTES
Spinal Block    Start time: 10/6/2021 9:13 AM  End time: 10/6/2021 9:21 AM  Reason for block: primary anesthetic  Staffing  Performed: resident/CRNA   Anesthesiologist: Yung Johnson MD  Resident/CRNA: VICTORIANO Manuel CRNA  Preanesthetic Checklist  Completed: patient identified, IV checked, site marked, risks and benefits discussed, surgical consent, monitors and equipment checked, pre-op evaluation, timeout performed, anesthesia consent given, oxygen available and patient being monitored  Spinal Block  Patient position: sitting  Prep: Betadine and site prepped and draped  Patient monitoring: cardiac monitor, continuous pulse ox, continuous capnometry and frequent blood pressure checks  Approach: midline  Location: L3/L4  Provider prep: mask and sterile gloves  Local infiltration: lidocaine  Dose: 0  Agent: bupivacaine  Adjuvant: epinephrine (wash)  Dose: 1.6  Dose: 1.6  Needle  Needle type: Pencan   Needle gauge: 22 G  Needle length: 3.5 in  Needle insertion depth: 6 cm  Assessment  Sensory level: T8  Swirl obtained: Yes  CSF: clear  Attempts: 1  Hemodynamics: stable

## 2021-10-06 NOTE — PROGRESS NOTES
1138 patient received from the OR monitor placed and alarms on. Report received from [facility] per report patient received a spinal and block. Sensation noted bilateral mid thigh area. 1145 xray done per order   1150 sensation noted bilateral knees  1225 patient  at bedside   1230 turned and no bruising or bleeding noted in spinal insertion site. Noted to be incontinent of a large amount of clear yellow urine linen changed and tolerated without difficulty   1235 sensation noted bilateral lower legs  able to wiggle her toes states she isn't aware they are moving.  left the hospital will return when patient gets her room   1240 placed in holding pattern waiting for a room assignment   1330 resting quietly with her eyes closed no signs of distress   1458 report called to [facility] prior to transfer  to room  1515 patient noted to be incontinent of small amount of clear yellow urine stated her bladder felt full and placed on bed pan to void with no results. states she has full sensation of lower legs denies any pain   1520 unable to void aguirre placed and 950 of clear yellow urine noted and Dr Werner Bishop updated okay to keep aguirre cath until the morning.  Patient states she feels much better   [phone] transferred to room 1118 via bed with belongings patient states she will call her  later today with her room number

## 2021-10-06 NOTE — PROGRESS NOTES
Pt ambulated in halls with Vivienne Wu PT and myself. Pt ambulated with front wheeled walker in halls and performed stairs. Pt tolerated well. Minimal pain during and after ambulation. Pt returned to chair, call light in reach, and  at bedside. Pt stated that she does have a walker at home, but it has 4 wheels on it. Pt has 3 steps to get into home. Pt stays on 1 level at home. Pt stated that her sister would be staying with her and her  for about a week once she is discharged home.

## 2021-10-07 VITALS
HEART RATE: 75 BPM | TEMPERATURE: 98 F | RESPIRATION RATE: 16 BRPM | HEIGHT: 61 IN | WEIGHT: 154 LBS | OXYGEN SATURATION: 99 % | DIASTOLIC BLOOD PRESSURE: 93 MMHG | SYSTOLIC BLOOD PRESSURE: 153 MMHG | BODY MASS INDEX: 29.07 KG/M2

## 2021-10-07 PROBLEM — Z96.651 S/P TOTAL KNEE REPLACEMENT, RIGHT: Status: ACTIVE | Noted: 2021-10-07

## 2021-10-07 PROCEDURE — 6370000000 HC RX 637 (ALT 250 FOR IP): Performed by: ORTHOPAEDIC SURGERY

## 2021-10-07 PROCEDURE — 97116 GAIT TRAINING THERAPY: CPT

## 2021-10-07 PROCEDURE — 97530 THERAPEUTIC ACTIVITIES: CPT

## 2021-10-07 PROCEDURE — 97110 THERAPEUTIC EXERCISES: CPT

## 2021-10-07 PROCEDURE — 2580000003 HC RX 258: Performed by: ORTHOPAEDIC SURGERY

## 2021-10-07 RX ORDER — OXYCODONE HYDROCHLORIDE AND ACETAMINOPHEN 5; 325 MG/1; MG/1
1 TABLET ORAL EVERY 6 HOURS PRN
Qty: 28 TABLET | Refills: 0 | Status: SHIPPED | OUTPATIENT
Start: 2021-10-07 | End: 2021-10-14

## 2021-10-07 RX ADMIN — SODIUM CHLORIDE, POTASSIUM CHLORIDE, SODIUM LACTATE AND CALCIUM CHLORIDE: 600; 310; 30; 20 INJECTION, SOLUTION INTRAVENOUS at 02:28

## 2021-10-07 RX ADMIN — CHOLECALCIFEROL (VITAMIN D3) 10 MCG (400 UNIT) TABLET 800 UNITS: at 08:56

## 2021-10-07 RX ADMIN — ASPIRIN 325 MG: 325 TABLET, COATED ORAL at 08:56

## 2021-10-07 RX ADMIN — Medication 10 ML: at 08:56

## 2021-10-07 RX ADMIN — CALCIUM 500 MG: 500 TABLET ORAL at 08:56

## 2021-10-07 RX ADMIN — OXYCODONE AND ACETAMINOPHEN 1 TABLET: 5; 325 TABLET ORAL at 08:56

## 2021-10-07 RX ADMIN — OXYCODONE AND ACETAMINOPHEN 1 TABLET: 5; 325 TABLET ORAL at 02:26

## 2021-10-07 ASSESSMENT — PAIN SCALES - GENERAL
PAINLEVEL_OUTOF10: 2
PAINLEVEL_OUTOF10: 5
PAINLEVEL_OUTOF10: 0
PAINLEVEL_OUTOF10: 6

## 2021-10-07 ASSESSMENT — PAIN - FUNCTIONAL ASSESSMENT: PAIN_FUNCTIONAL_ASSESSMENT: PREVENTS OR INTERFERES SOME ACTIVE ACTIVITIES AND ADLS

## 2021-10-07 ASSESSMENT — PAIN DESCRIPTION - PAIN TYPE: TYPE: SURGICAL PAIN

## 2021-10-07 ASSESSMENT — PAIN DESCRIPTION - LOCATION: LOCATION: KNEE

## 2021-10-07 ASSESSMENT — PAIN DESCRIPTION - DESCRIPTORS: DESCRIPTORS: ACHING

## 2021-10-07 ASSESSMENT — PAIN DESCRIPTION - PROGRESSION: CLINICAL_PROGRESSION: NOT CHANGED

## 2021-10-07 ASSESSMENT — PAIN DESCRIPTION - FREQUENCY: FREQUENCY: CONTINUOUS

## 2021-10-07 ASSESSMENT — PAIN DESCRIPTION - ORIENTATION: ORIENTATION: RIGHT

## 2021-10-07 ASSESSMENT — PAIN DESCRIPTION - ONSET: ONSET: ON-GOING

## 2021-10-07 NOTE — DISCHARGE SUMMARY
DISCHARGE SUMMARY:  Tamar Austin MD, MD     Date of Admission:      10/6/2021  Date of Discharge:    10/7/2021     Admission Diagnosis   Primary osteoarthritis of right knee [M17.11]   Discharge Diagnosis   Same    Procedure:    Right Total knee replacement 10/6/2021    Consultations:  IP CONSULT TO CASE MANAGEMENT  IP CONSULT TO HOME CARE NEEDS  IP CONSULT TO PRIMARY CARE PROVIDER    Brief history and hospital stay. Rasheeda Mcleod is a 60 y.o. female who underwent total knee replacement procedure without complication. Rasheeda Mcleod was admitted to the floor following surgery. Appropriate consults were obtained as outlined in progress notes. The patients diet was advanced as tolerated. The patient remained hemodynamically stable and neurovascularly intact in the lower extremity throughout the hospital course. On the day of discharge the patient's calf remained soft and showed no evidence of DVT. Physical therapy and occupational therapy were consulted. The patient progressed well with PT/OT throughout the stay. DVT prophylaxis was initiated and will continue for 2 weeks. The patients pain was controlled initially with IV pain medications and then was transitioned to oral pain medications prior to discharge    The patient was discharged to Home and will continue to follow the precautions outlined to them by us and PT/OT.      Condition on Discharge: Stable    Medications     Cathy Pierce   Home Medication Instructions JWS:[id number]    Printed on:10/07/21 0734   Medication Information                      aspirin 325 MG EC tablet  Take 1 tablet by mouth 2 times daily             calcium carbonate (OYSTER SHELL CALCIUM 500 MG) 1250 (500 Ca) MG tablet  Take by mouth             celecoxib (CELEBREX) 200 MG capsule  Take 1 capsule by mouth daily             Cholecalciferol (VITAMIN D) 10 MCG (400 UNIT) CAPS Capsule  Take 2 capsules by mouth Cholecalciferol (VITAMIN D3) 1.25 MG (79054 UT) CAPS  TAKE ONE CAPSULE BY MOUTH ONCE A WEEK             ferrous sulfate (IRON 325) 325 (65 Fe) MG tablet  Take 325 mg by mouth daily (with breakfast)             oxyCODONE-acetaminophen (PERCOCET) 5-325 MG per tablet  Take 1 tablet by mouth every 6 hours as needed for Pain for up to 7 days. Intended supply: 7 days. Take lowest dose possible to manage pain                   Plan  Discharge instructions were given along with prescriptions for pain medications as well as DVT prophylaxis. Plan is to follow up in 2-3 weeks for a wound check and x-rays. Patient was instructed on the use of pain medications, the signs and symptoms of infection or blood clot, and was given our number to call should they have any questions or concerns following discharge.     Saniya Metzger MD, MD

## 2021-10-07 NOTE — PROGRESS NOTES
Physical Therapy    Physical Therapy Treatment Note  Name: Thee Bowser MRN: [id number] :   1965   Date:  10/7/2021   Admission Date: 10/6/2021 Room:  [address] [place] [address]   Restrictions/Precautions:  Restrictions/Precautions  Restrictions/Precautions: General Precautions, Weight Bearing Lower Extremity Weight Bearing Restrictions  Right Lower Extremity Weight Bearing: Weight Bearing As Tolerated     s/p R TKA  Communication with other providers: nurse gave pain meds at start of tx  Subjective:  Patient states:  Pleasant and motivated  Pain:   Location, Type, Intensity (0/10 to 10/10): Pt reports \"good\" when asked regarding pain at end of tx session  Objective:    Observation:  Alert and oriented  Treatment, including education/measures:  Sup to sit sba  Sit<=>stand from bed, chair and commode sba  Pt is independent with brad care in sitting and was able to stand at sink without UE support to wash hands. amb with rw 10' x 2, 20' x 1, and  200' x1 sba  Pt given TKA booklet and reviewed safety  Pt performed ex with written copy HEP and cues  2-0 reps aps  15 reps quad sets  15 reps glut sets  10 reps saqs  10 reps slr  10 reps laq  10 reps HS curl standing at walker  Safety  Patient left safely in the chair, with call light/phone in reach. Gait belt and mask were used for transfers and gait. Assessment / Impression:       Patient's tolerance of treatment:  good  Adverse Reaction: na  Significant change in status and impact:  na  Barriers to improvement: none  Plan for Next Session:    Cont.  POC  Time in:  0840  Time out:  0950  Timed treatment minutes:  70  Total treatment time:  79    Previously filed items:  Social/Functional History  Lives With: Spouse  Type of Home: House  Home Layout: One level  Home Access: Stairs to enter with rails  Entrance Stairs - Number of Steps: 3  Bathroom Shower/Tub: Tub/Shower unit  Home Equipment: 4 wheeled walker  ADL Assistance: [address] [place] [address] Avenue: Independent  Ambulation Assistance: Independent  Transfer Assistance: Independent     Long term goals  Time Frame for Long term goals :  In one week:  Long term goal 1: Pt will ambulate 500 feet with modified independence with LRAD  Long term goal 2: Pt will independently ascend/descend 6 steps with a handrail  Long term goal 3: Pt will independently complete 3 sets of 10 reps of BLE AROM exercises in available and allowed ROM    Electronically signed by:    Rafiq Fonseca PTA  10/7/2021, 8:37 AM

## 2021-10-07 NOTE — PROGRESS NOTES
Doing well postoperatively. Pain controlled  Did well with physical therapy yesterday    Had urinary retention after surgery. Yang catheter was placed and left overnight    Objective:   Patient Vitals for the past 4 hrs:   BP Temp Pulse Resp SpO2   10/07/21 0557 111/60 98.4 °F (36.9 °C) 72 16 99 %         Physical Exam:     The patient is awake and alert  Resting comfortably in bed    Operative extremity:    The dressing is clean dry and intact. Incision is intact with Dermabond dressing. No erythema, drainage, or induration. Calf is soft and nontender. Sensation and motor function intact distally      LABS   CBC: No results for input(s): WBC, HGB, PLT in the last 72 hours. Postoperative x-rays show well aligned prosthesis with no complications. Assessment and Plan     1. POD # 1 total knee replacement    1:  Physical therapy consult for mobilization   -Weight-bear as tolerated, progress as tolerated  2:  DVT prophylaxis   -Aspirin twice a day for 2 weeks  3:  Continue Pain Control   -Oral medications as needed, IV medication only for breakthrough pain  4. Medical management per hospitalist service  -DC Yang catheter this morning. Monitor for any signs of ongoing urinary retention. Patient can be discharged home if she is voiding well  5:  D/C Planning:    -Discharge to home later today if patient is mobilizing well with physical therapy and pain is well controlled. -Follow-up in 2 weeks for x-rays and wound check.          Mariposa Dumont MD

## 2021-10-07 NOTE — PLAN OF CARE
Problem: Activity:  Goal: Ability to return to normal activity level will improve  Description: Ability to return to normal activity level will improve  10/7/2021 0321 by Pepper Gallegos RN  Outcome: Ongoing  10/6/2021 1551 by Jae Cueto RN  Outcome: Ongoing     Problem:  Bowel/Gastric:  Goal: Gastrointestinal status for postoperative course will improve  Description: Gastrointestinal status for postoperative course will improve  10/7/2021 0321 by Pepper Gallegos RN  Outcome: Ongoing  10/6/2021 1551 by Jae Cueto RN  Outcome: Ongoing     Problem: Health Behavior:  Goal: Identification of resources available to assist in meeting health care needs will improve  Description: Identification of resources available to assist in meeting health care needs will improve  10/7/2021 0321 by Pepper Gallegos RN  Outcome: Ongoing  10/6/2021 1551 by Jae Cueto RN  Outcome: Ongoing     Problem: Physical Regulation:  Goal: Postoperative complications will be avoided or minimized  Description: Postoperative complications will be avoided or minimized  10/7/2021 0321 by Pepper Gallegos RN  Outcome: Ongoing  10/6/2021 1551 by Jae Cueto RN  Outcome: Ongoing     Problem: Respiratory:  Goal: Ability to achieve and maintain a regular respiratory rate will improve  Description: Ability to achieve and maintain a regular respiratory rate will improve  10/7/2021 0321 by Pepper Gallegos RN  Outcome: Ongoing  10/6/2021 1551 by Jae Cueto RN  Outcome: Ongoing     Problem: Safety:  Goal: Ability to remain free from injury will improve  Description: Ability to remain free from injury will improve  10/7/2021 0321 by Pepper Gallegos RN  Outcome: Ongoing  10/6/2021 1551 by Jae Cueto RN  Outcome: Ongoing     Problem: Skin Integrity:  Goal: Demonstration of wound healing without infection will improve  Description: Demonstration of wound healing without infection will improve  10/7/2021 0321 by Benjamín Ramirez RN  Outcome: Ongoing  10/6/2021 1551 by Marilynn Sutherland RN  Outcome: Ongoing     Problem: Falls - Risk of:  Goal: Will remain free from falls  Description: Will remain free from falls  Outcome: Ongoing  Goal: Absence of physical injury  Description: Absence of physical injury  Outcome: Ongoing     Problem: Pain:  Goal: Pain level will decrease  Description: Pain level will decrease  Outcome: Ongoing  Goal: Control of acute pain  Description: Control of acute pain  Outcome: Ongoing  Goal: Control of chronic pain  Description: Control of chronic pain  Outcome: Ongoing

## 2021-10-07 NOTE — PROGRESS NOTES
Pt assisted with dressing for discharge. IV removed. Discharge instructions reviewed. All questions answered. Walker delivered to room by home DME. Denied needs at this time.

## 2021-10-07 NOTE — CARE COORDINATION
Chart reviewed and message to Raysa Amaya, who confirmed that pt is on schedule for walker delivery this morning. Per therapy notes and previous CM notes, pt is planning to attend OP therapy. CM spoke with ortho navigator, Ronny Short, who states pt will be discharge today. Informed Ronny Short no needs identified by CM at this time. CM available should needs present.

## 2021-10-07 NOTE — CONSULTS
Internal medicine consult      Reason for admission: Right total knee replacement    History Obtained From:  patient    HISTORY OF PRESENT ILLNESS:    The patient is a 60 y.o. female who presents for right total knee replacement on 10/06/2021 with Dr. Brii Garcia. She has history of osteoarthritis, obesity. Patient was having ongoing bilateral right worse than left knee pain. She was treated conservatively and given knee injections with no lasting relief. Patient came to the hospital for total knee replacement on the right side. She tolerated the procedure well on 10/06/2021. Postop x-ray shows successful knee replacement with no complications. Patient tolerated PT/OT well the following day after surgery. The patient voices no acute concerns at this time. She is afebrile. She was having some urinary retention initially and Yang catheter has now been removed. Internal medicine has been consulted for medical management of the patient. Past Medical History:        Diagnosis Date    Arthritis        Past Surgical History:        Procedure Laterality Date    JOINT REPLACEMENT      scheduled 10/6/21 for right knee       Medications Prior to Admission:    Medications Prior to Admission: ferrous sulfate (IRON 325) 325 (65 Fe) MG tablet, Take 325 mg by mouth daily (with breakfast)  calcium carbonate (OYSTER SHELL CALCIUM 500 MG) 1250 (500 Ca) MG tablet, Take by mouth  Cholecalciferol (VITAMIN D3) 1.25 MG (99770 UT) CAPS, TAKE ONE CAPSULE BY MOUTH ONCE A WEEK  Cholecalciferol (VITAMIN D) 10 MCG (400 UNIT) CAPS Capsule, Take 2 capsules by mouth  [DISCONTINUED] Fenoprofen Calcium 200 MG CAPS, TAKE TWO TABLETS FOUR TIMES A DAY. [DISCONTINUED] Ketoprofen 25 MG CAPS, TAKE THREE TABLETS THREE TIMES A DAY  [DISCONTINUED] celecoxib (CELEBREX) 200 MG capsule, Take 1 capsule by mouth daily  celecoxib (CELEBREX) 200 MG capsule, Take 1 capsule by mouth daily    Allergies:  Patient has no known allergies.     Social History: TOBACCO:   reports that she has never smoked. She has never used smokeless tobacco.  ETOH:   reports current alcohol use. Family History:   No family history on file. REVIEW OF SYSTEMS:  CONSTITUTIONAL:  Neg   fever,chills   EYES:  Neg  blurriness  EARS:  Neg   hearing loss  NOSE:  Neg  rhinorrhea  MOUTH/THROAT:  Neg   sore throat. RESPIRATORY:   Neg SOB,wheeze,cough  CARDIOVASCULAR   Neg : chest pain,palpitations,or edema  GASTROINTESTINAL:  Neg   nausea,vomiting, or abdominal pain. GENITOURINARY:  Neg  Urinary complaints   HEMATOLOGIC/LYMPHATIC:  Neg  Anemia  MUSCULOSKELETAL:  bilateral knee pain  NEUROLOGICAL:  Neg  Loss of Consciousness,aphasia or dysarthria. SKIN :  Neg  skin or hair changes,and has no itching,rashes,sores. PSYCHIATRIC:  Neg depression,personality changes,anxiety. PHYSICAL EXAM:  /60   Pulse 72   Temp 98.4 °F (36.9 °C)   Resp 16   Ht 5' 1\" (1.549 m)   Wt 154 lb (69.9 kg)   SpO2 99%   BMI 29.10 kg/m²   General appearance: alert, appears stated age, cooperative and no distress  Head: Normocephalic, without obvious abnormality, atraumatic  Eyes: conjunctivae/corneas clear. Ears: normal external  ears  Neck: no adenopathy,   Lungs: clear to auscultation bilaterally  Heart: regular rate and rhythm, S1, S2 normal  Abdomen:soft, non-tender; non-distended normal bowel sounds  Extremities:Pedal edema Trace. Compression stockings in place. No signs of infection with right knee surgery site. Skin: No rashes or lesions  Neurologic: Alert and oriented X 3, no focal deficits  Mental status: Alert, oriented, thought content appropriate  Cranial nerves:II-XII Grossly intact      DATA:    Imaging:  X-ray right knee, 10/6/2021  Sequelae of right knee arthroplasty without adverse features evident.      CBC with Differential:    Lab Results   Component Value Date    WBC 5.5 09/21/2021    RBC 4.73 09/21/2021    HGB 12.5 09/21/2021    HCT 39.2 09/21/2021     09/21/2021 MCV 82.9 09/21/2021    SEGSPCT 65.0 09/21/2021    LYMPHOPCT 22.8 09/21/2021    DIFFTYPE AUTOMATED DIFFERENTIAL 09/21/2021     BMP:    Lab Results   Component Value Date     09/21/2021    K 4.1 09/21/2021     09/21/2021    CO2 25 09/21/2021    BUN 8 09/21/2021    CREATININE 0.6 09/21/2021    CALCIUM 9.3 09/21/2021    GFRAA >60 09/21/2021    LABGLOM >60 09/21/2021    GLUCOSE 107 09/21/2021     PT/INR:  No results found for: PTINR    ASSESSMENT / PLAN:     Assessment:  S/p total right knee replacement (10/6/2021): Postop x-ray shows no complications. Pain is controlled. Tolerating PT/OT. Will have outpatient therapy and stay on aspirin for 1 month for DVT prophylaxis per Ortho. Follow-up Ortho as outpatient. Osteoarthritis: S/p right knee replacement. On vitamin D and calcium. Obesity    Plan:  Continue PT/OT. Will have outpatient therapy. Aspirin for 1 month per Ortho. Yang removed, monitor for signs of urinary retention. Will be discharged today. Follow-up with Ortho and PCP. Electronically signed by Juan Lizama PA-C on 10/7/2021 at 7:58 AM  I have independently evaluated and examined this patient today. I have reviewed radiologic and biochemical tests on this patient. Management Plan is developed mutually with SANDRA Cerrato. I have reviewed above note and agree with assessment and plan. Discussed with patient. Worried about urinary retention, patient will have voiding trial before she goes home.  Monitor condition closely

## 2021-10-11 ENCOUNTER — HOSPITAL ENCOUNTER (OUTPATIENT)
Dept: PHYSICAL THERAPY | Age: 56
Setting detail: THERAPIES SERIES
Discharge: HOME | End: 2021-10-11
Payer: COMMERCIAL

## 2021-10-11 PROCEDURE — 97016 VASOPNEUMATIC DEVICE THERAPY: CPT

## 2021-10-11 PROCEDURE — 97161 PT EVAL LOW COMPLEX 20 MIN: CPT

## 2021-10-11 PROCEDURE — 97110 THERAPEUTIC EXERCISES: CPT

## 2021-10-11 ASSESSMENT — PAIN DESCRIPTION - PROGRESSION: CLINICAL_PROGRESSION: GRADUALLY IMPROVING

## 2021-10-11 ASSESSMENT — PAIN DESCRIPTION - LOCATION: LOCATION: KNEE

## 2021-10-11 ASSESSMENT — PAIN DESCRIPTION - DESCRIPTORS: DESCRIPTORS: ACHING

## 2021-10-11 ASSESSMENT — PAIN - FUNCTIONAL ASSESSMENT: PAIN_FUNCTIONAL_ASSESSMENT: PREVENTS OR INTERFERES WITH ALL ACTIVE AND SOME PASSIVE ACTIVITIES

## 2021-10-11 ASSESSMENT — PAIN DESCRIPTION - ORIENTATION: ORIENTATION: RIGHT

## 2021-10-11 ASSESSMENT — PAIN SCALES - GENERAL: PAINLEVEL_OUTOF10: 5

## 2021-10-11 ASSESSMENT — PAIN DESCRIPTION - FREQUENCY: FREQUENCY: CONTINUOUS

## 2021-10-11 ASSESSMENT — PAIN DESCRIPTION - ONSET: ONSET: PROGRESSIVE

## 2021-10-11 ASSESSMENT — PAIN DESCRIPTION - PAIN TYPE: TYPE: SURGICAL PAIN

## 2021-10-11 NOTE — ANESTHESIA POSTPROCEDURE EVALUATION
Department of Anesthesiology  Postprocedure Note    Patient: Thiago Tapia  MRN: [id number]  YOB: 1965  Date of evaluation: 10/11/2021  Time:  4:09 PM     Procedure Summary     Date: 10/06/21 Room / Location: [address] [place] [address]    Anesthesia Start: 9415 Anesthesia Stop: 6168    Procedure: RIGHT KNEE TOTAL ARTHROPLASTY ROBOTIC (Right ) Diagnosis: (PRIMARY OA OF RIGHT KNEE)    Surgeons: Shun Morales MD Responsible Provider: Tony Santillan MD    Anesthesia Type: general, MAC, spinal, regional ASA Status: 2          Anesthesia Type: general, MAC, spinal, regional    Isabel Phase I: Isabel Score: 10    Isabel Phase II:      Last vitals: Reviewed and per EMR flowsheets.        Anesthesia Post Evaluation    Patient location during evaluation: PACU  Patient participation: complete - patient participated  Level of consciousness: sleepy but conscious  Pain score: 3  Airway patency: patent  Nausea & Vomiting: no nausea and no vomiting  Complications: no  Cardiovascular status: hemodynamically stable  Respiratory status: acceptable  Hydration status: euvolemic

## 2021-10-11 NOTE — PLAN OF CARE
Outpatient Physical Therapy           [place]           [x] Phone: [phone]   Fax: [phone]  [place]           [] Phone: [phone]   Fax: [phone]     To: Referring Practitioner: Ana Mendez    From: Guilherme Santos PT, PT     Patient: Alex Mcnamara       : 1965  Diagnosis: Diagnosis: R TKA   Treatment Diagnosis: Treatment Diagnosis: R knee pain, stiffness, weakness, altered gait   Date: 10/11/2021    Physical Therapy Certification/Re-Certification Form  Dear Dr. Ana Mendez,   The following patient has been evaluated for physical therapy services and for therapy to continue, insurance requires physician review of the treatment plan initially and every 90 days. Please review the attached evaluation and/or summary of the patient's plan of care, and verify that you agree therapy should continue by signing the attached document and sending it back to our office. Assessment:    Assessment: Pt is a 59 yo female who presents to PT 5 days postop R TKA. She has not Hx prior knee surgery but L knee is needing replaced as well. She is ambulating w/ 2WW w/ good WB, has moderate edema, limited ROM and strength in R LE. These limitations are affecting her ability to work and perform usual daily activity. She will benefit from PTto help restore ROM, strength, gait and PLOF. Prior to  she was walking w/ min limp or pain. Patient agrees with established plan of care and assisted in the development of their short term and long term goals. Patient had no adverse reaction with initial treatment and there are no barriers to learning. Demonstrates no mental or cognitive disorder.       Plan of Care/Treatment to date:  [x] Therapeutic Exercise  [x] Modalities:  [x] Therapeutic Activity     [] Ultrasound  [] Electrical Stimulation  [x] Gait Training      [] Cervical Traction [] Lumbar Traction  [x] Neuromuscular Re-education    [x] Cold/hotpack [] Iontophoresis   [x] Instruction in HEP      [x] Vasopneumatic    [] Dry Needling  [x] Manual Therapy               [] Aquatic Therapy       Other:          Frequency/Duration:  # Days per week: [] 1 day # Weeks: [] 1 week [] 5 weeks     [x] 2 days   [] 2 weeks [x] 6 weeks     [] 3 days   [] 3 weeks [] 7 weeks     [] 4 days   [] 4 weeks [] 8 weeks         [] 9 weeks [] 10 weeks         [] 11 weeks [] 12 weeks    Rehab Potential/Progress: [] Excellent [x] Good [] Fair  [] Poor     Goals:    Patient goals : walking and running w/o pain/limit, no device, get it bending again. Short term goals  Time Frame for Short term goals: 3 weeks  Short term goal 1: 3 weeks  Short term goal 2: Pt will be able to walk w/ SPC or less at least 50% of the time  Short term goal 3: Pt will be able to achieve at least 100° flex for ease of gait and stairs  Short term goal 4: Pt will have pain <5/10 at least 50% of the time  Long term goals  Time Frame for Long term goals : 6 weeks  Long term goal 1: 6 weeks  Long term goal 2: Pt will be able to walk w/o device at least 75% of the time w/o R knee pain  Long term goal 3: Pt will be able to go up/down stairs reciprocally w/o pain or limit  Long term goal 4: Pt will have improved KOOS score by 20% or more      Electronically signed by:  Billy Dominguez, PT, PT, MPT, ATC  10/11/2021, 7:02 PM    10/11/2021, 7:03 PM  If you have any questions or concerns, please don't hesitate to call.   Thank you for your referral.      Physician Signature:________________________________Date:_________ TIME: _____  By signing above, therapists plan is approved by physician

## 2021-10-11 NOTE — FLOWSHEET NOTE
Outpatient Physical Therapy  [place]           [x] Phone: [phone]   Fax: [phone]  [place]           [] Phone: [phone]   Fax: [phone]        Physical Therapy Daily Treatment Note  Date:  10/11/2021    Patient Name:  Tea Ojeda    :  1965  MRN: [id number]  Restrictions/Precautions: Other position/activity restrictions: postop R TKA  Diagnosis:   Diagnosis: R TKA  Date of Injury/Surgery: 10/6/21  Treatment Diagnosis: Treatment Diagnosis: R knee pain, stiffness, weakness, altered gait    Insurance/Certification information: PT Insurance Information: Medical [place] 20 PCY --and considering other knee so watch visits please  Referring Physician:  Referring Practitioner: Ron Ramírez  Next Doctor Visit:    Plan of care signed (Y/N):  Pending cosign  Outcome Measure: KOOS 61%  Visit# / total visits:    POC   Pain level: 5/10   Goals:     Patient goals : walking and running w/o pain/limit, no device, get it bending again. Short term goals  Time Frame for Short term goals: 3 weeks  Short term goal 1: 3 weeks  Short term goal 2: Pt will be able to walk w/ SPC or less at least 50% of the time  Short term goal 3: Pt will be able to achieve at least 100° flex for ease of gait and stairs  Short term goal 4: Pt will have pain <5/10 at least 50% of the time  Long term goals  Time Frame for Long term goals : 6 weeks  Long term goal 1: 6 weeks  Long term goal 2: Pt will be able to walk w/o device at least 75% of the time w/o R knee pain  Long term goal 3: Pt will be able to go up/down stairs reciprocally w/o pain or limit  Long term goal 4: Pt will have improved KOOS score by 20% or more    Summary of Evaluation: Assessment: Pt is a 59 yo female who presents to PT 5 days postop R TKA. She has not Hx prior knee surgery but L knee is needing replaced as well. She is ambulating w/ 2WW w/ good WB, has moderate edema, limited ROM and strength in R LE.   These limitations are affecting her ability to work and perform usual daily activity. She will benefit from PTto help restore ROM, strength, gait and PLOF. Prior to 2020 she was walking w/ min limp or pain. Subjective:  See dev         Any changes in Ambulatory Summary Sheet? None        Objective:  See dev   COVID screening questions were asked and patient attested that there had been no contact or symptoms        Exercises: (No more than 4 columns)   Exercise/Equipment 10/11/21  #1 Date Date           WARM UP                     TABLE      QS       SLR       Calf/ham stretch long sit Strap 30\" x2     Heel slide  Strap 10x AAROM     Seated LAQ  Flex  10x  15x                          STANDING      Heel raise  10x     march 10x      STS  W/ walker 10x                                  PROPRIOCEPTION                                    MODALITIES      Vaso  15'               Other Therapeutic Activities/Education:  10/11/2021: Patient received education on their current pathology and how their condition effects them with their functional activities. Patient understood discussion and questions were answered. Patient understands their activity limitations and understands rational for treatment progression. Home Exercise Program:  Issued, practiced and pt demo ability to perform 10/11/2021       Manual Treatments:  PROM       Modalities:  Vaso low pressure to R knee, pt recumb w/ leg on pillow, 15'      Communication with other providers:  POC set for cosign 10/11/21      Assessment:  Pt is a 61 yo female who presents to PT 5 days postop R TKA. She has not Hx prior knee surgery but L knee is needing replaced as well. She is ambulating w/ 2WW w/ good WB, has moderate edema, limited ROM and strength in R LE. These limitations are affecting her ability to work and perform usual daily activity. She will benefit from PTto help restore ROM, strength, gait and PLOF. Prior to 2020 she was walking w/ min limp or pain.       Plan for Next Session: progress ROM, strength, gait, balance to yanni, pain and edema control      Time In / Time Out:    1015/1115       Timed Code/Total Treatment Minutes:  25/60  2 TE 25', 1 Vaso 15', 1 Eval 20'      Next Progress Note due:  30 days      Plan of Care Interventions:  [x] Therapeutic Exercise  [x] Modalities:  [x] Therapeutic Activity     [] Ultrasound  [] Estim  [x] Gait Training      [] Cervical Traction [] Lumbar Traction  [x] Neuromuscular Re-education    [x] Cold/hotpack [] Iontophoresis   [x] Instruction in HEP      [x] Vasopneumatic   [] Dry Needling    [x] Manual Therapy               [] Aquatic Therapy              Electronically signed by:  Soren Estrella, PT, PT, MPT, ATC  10/11/2021, 7:00 PM    10/11/2021, 7:02 PM

## 2021-10-11 NOTE — PROGRESS NOTES
Physical Therapy  Initial Assessment  Date: 10/11/2021  Patient Name: Alda Mora  MRN: [id number]  : 1965     Treatment Diagnosis: R knee pain, stiffness, weakness, altered gait    Restrictions  Position Activity Restriction  Other position/activity restrictions: postop R TKA    Subjective   General  Chart Reviewed: Yes  Patient assessed for rehabilitation services?: Yes  Additional Pertinent Hx: R TKA done 10/6/21  Referring Practitioner: Roibn Tucker  Diagnosis: R TKA  PT Visit Information  PT Insurance Information: Medical [place] 20 PCY  Subjective  Subjective: stayed one night in hosp, only PT was there, some trouble w/ constipation, doing ok otherwise, pain improving, sleep is limited but ok. L knee is needing done too at some point. Pain Screening  Patient Currently in Pain: Yes  Pain Assessment  Pain Assessment: 0-10  Pain Level: 5 (max in last 48 hours 5-6/10)  Patient's Stated Pain Goal: No pain  Pain Type: Surgical pain  Pain Location: Knee  Pain Orientation: Right  Pain Descriptors: Aching  Pain Frequency: Continuous  Pain Onset: Progressive  Clinical Progression: Gradually improving  Functional Pain Assessment: Prevents or interferes with all active and some passive activities  Vital Signs  Patient Currently in Pain: Yes    Vision/Hearing       Orientation  Orientation  Overall Orientation Status: Within Normal Limits    Social/Functional History  Social/Functional History  Lives With: Family  Type of Home: House  Home Layout: Two level (can stay on one level)  Home Access: Stairs to enter without rails  Entrance Stairs - Number of Steps: 2 steps uses wall and does use walker some  Bathroom Shower/Tub: Walk-in shower  Home Equipment: Rolling walker  Receives Help From: Family  Occupation: Full time employment  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     Objective     Observation/Palpation  Palpation: min TTP noted this date  Edema: moderate    AROM RLE (degrees)  RLE General AROM: knee 0-72    Strength RLE  Comment: fair to good quad tone, very min A w/ SLR and very min ext lag also, ankle WNL     Additional Measures  Special Tests: NT d/t postop  Sensation  Overall Sensation Status: WNL     Assessment   Conditions Requiring Skilled Therapeutic Intervention  Body structures, Functions, Activity limitations: Decreased functional mobility ; Decreased ADL status; Decreased ROM; Decreased strength;Decreased high-level IADLs; Increased pain  Assessment: Pt is a 61 yo female who presents to PT 5 days postop R TKA. She has not Hx prior knee surgery but L knee is needing replaced as well. She is ambulating w/ 2WW w/ good WB, has moderate edema, limited ROM and strength in R LE. These limitations are affecting her ability to work and perform usual daily activity. She will benefit from PTto help restore ROM, strength, gait and PLOF. Prior to 2020 she was walking w/ min limp or pain. Patient agrees with established plan of care and assisted in the development of their short term and long term goals. Patient had no adverse reaction with initial treatment and there are no barriers to learning. Demonstrates no mental or cognitive disorder.     Treatment Diagnosis: R knee pain, stiffness, weakness, altered gait  Prognosis: Good  Decision Making: Low Complexity  Barriers to Learning: none  REQUIRES PT FOLLOW UP: Yes  Treatment Initiated : see flow sheet         Plan   Plan  Times per week: 2x  Plan weeks: 6weeks  Specific instructions for Next Treatment: progress ROM, strength, gait, balance to yanni, pain and edema control  Current Treatment Recommendations: Strengthening, ROM, Functional Mobility Training, Pain Management, Modalities, Manual Therapy - Joint Manipulation, Manual Therapy - Soft Tissue Mobilization, Neuromuscular Re-education, Home Exercise Program, Patient/Caregiver Education & Training        OutComes Score     KOOS 61% Goals  Short term goals  Time Frame for Short term goals: 3 weeks  Short term goal 1: 3 weeks  Short term goal 2: Pt will be able to walk w/ SPC or less at least 50% of the time  Short term goal 3: Pt will be able to achieve at least 100° flex for ease of gait and stairs  Short term goal 4: Pt will have pain <5/10 at least 50% of the time  Long term goals  Time Frame for Long term goals : 6 weeks  Long term goal 1: 6 weeks  Long term goal 2: Pt will be able to walk w/o device at least 75% of the time w/o R knee pain  Long term goal 3: Pt will be able to go up/down stairs reciprocally w/o pain or limit  Long term goal 4: Pt will have improved KOOS score by 20% or more  Patient Goals   Patient goals : walking and running w/o pain/limit, no device, get it bending again.           Marck Peterson, PT  PT, MPT, ATC     10/11/2021, 6:59 PM

## 2021-10-15 ENCOUNTER — HOSPITAL ENCOUNTER (OUTPATIENT)
Dept: PHYSICAL THERAPY | Age: 56
Setting detail: THERAPIES SERIES
Discharge: HOME | End: 2021-10-15
Payer: COMMERCIAL

## 2021-10-15 PROCEDURE — 97110 THERAPEUTIC EXERCISES: CPT

## 2021-10-15 PROCEDURE — 97530 THERAPEUTIC ACTIVITIES: CPT

## 2021-10-15 NOTE — FLOWSHEET NOTE
Outpatient Physical Therapy  [place]           [x] Phone: [phone]   Fax: [phone]  [place]           [] Phone: [phone]   Fax: [phone]        Physical Therapy Daily Treatment Note  Date:  10/15/2021    Patient Name:  Thee Bowser    :  1965  MRN: [id number]  Restrictions/Precautions: Other position/activity restrictions: postop R TKA  Diagnosis:   Diagnosis: R TKA  Date of Injury/Surgery: 10/6/21  Treatment Diagnosis: Treatment Diagnosis: R knee pain, stiffness, weakness, altered gait    Insurance/Certification information: PT Insurance Information: Medical [place] 20 PCY --and considering other knee so watch visits please  Referring Physician:  Referring Practitioner: Jaky Bob Doctor Visit:    Plan of care signed (Y/N):  yes  Outcome Measure: KOOS 61%  Visit# / total visits:    POC   Pain level: 0/10   Goals:     Patient goals : walking and running w/o pain/limit, no device, get it bending again. Short term goals  Time Frame for Short term goals: 3 weeks  Short term goal 1: 3 weeks  Short term goal 2: Pt will be able to walk w/ SPC or less at least 50% of the time  Short term goal 3: Pt will be able to achieve at least 100° flex for ease of gait and stairs  Short term goal 4: Pt will have pain <5/10 at least 50% of the time  Long term goals  Time Frame for Long term goals : 6 weeks  Long term goal 1: 6 weeks  Long term goal 2: Pt will be able to walk w/o device at least 75% of the time w/o R knee pain  Long term goal 3: Pt will be able to go up/down stairs reciprocally w/o pain or limit  Long term goal 4: Pt will have improved KOOS score by 20% or more    Summary of Evaluation: Assessment: Pt is a 59 yo female who presents to PT 5 days postop R TKA. She has not Hx prior knee surgery but L knee is needing replaced as well. She is ambulating w/ 2WW w/ good WB, has moderate edema, limited ROM and strength in R LE.   These limitations are affecting her ability to work and perform usual daily activity. She will benefit from PTto help restore ROM, strength, gait and PLOF. Prior to 2020 she was walking w/ min limp or pain. Subjective:  Aron Bundy arrives with no pain in her Rt knee and states compliance with her HEP. Any changes in Ambulatory Summary Sheet? None        Objective:  COVID screening questions were asked and patient attested that there had been no contact or symptoms    Antalgic gait with decrease hip and knee flexion   Glued incision is intact, warmth and swelling around the knee    Exercises: (No more than 4 columns)   Exercise/Equipment 10/11/21  #1 10/15/21 #2 Date           WARM UP                     TABLE      QS   Towel under the knee 5ct x10    SLR   Next    Calf/ham stretch long sit Strap 30\" x2 Strap 30\" x2    Heel slide  Strap 10x AAROM Strap 10x AAROM    Seated LAQ  Flex  10x  15x 10x2    SAQ  With half foam roll under her thigh 2x10                    STANDING      Heel raise  10x 2x10    march 10x      STS  W/ walker 10x BUE assist no walker 2x10    Weight shift  5ct 2x10    HS curl  Less than 90 degrees 2x10    Gait training  Gait training with [facility] AND [place] today CGA: cues for four point step to sequence, increase toe push off, and increase stance time on RLE x1 lap in gym               [address]  13' Denied today              Other Therapeutic Activities/Education:  10/11/2021: Patient received education on their current pathology and how their condition effects them with their functional activities. Patient understood discussion and questions were answered. Patient understands their activity limitations and understands rational for treatment progression.         Home Exercise Program:  Issued, practiced and pt demo ability to perform 10/11/2021       Manual Treatments:  PROM       Modalities:  Vaso low pressure to R knee, pt recumb w/ leg on pillow, 15'      Communication with other providers: POC set for cosign 10/11/21      Assessment:  Galina tolerates all activities well. AAROM heel slides and AROM HS curl to ~less than 90 degrees is slightly painful for her. She does well with [facility] AND [place] with only requiring min v/c for proper gait sequence at [facility]. Instructed/educated her that she is still appropriate for her walker since she is still bearing 50% or more of her weight with her walker. She completes today's session with a pain rating of 7/10 and states that she feels comfortable icing her knee at home. Plan for Next Session:  progress ROM, strength, gait, balance to yanni, pain and edema control      Time In / Time Out:   1105/1145       Timed Code/Total Treatment Minutes:  36' 1 TE, 2 TA 30'    Next Progress Note due:  30 days      Plan of Care Interventions:  [x] Therapeutic Exercise  [x] Modalities:  [x] Therapeutic Activity     [] Ultrasound  [] Estim  [x] Gait Training      [] Cervical Traction [] Lumbar Traction  [x] Neuromuscular Re-education    [x] Cold/hotpack [] Iontophoresis   [x] Instruction in HEP      [x] Vasopneumatic   [] Dry Needling    [x] Manual Therapy               [] Aquatic Therapy              Electronically signed by:   Ramon Beltrán PTA,  10/15/2021,11:58 AM

## 2021-10-18 ENCOUNTER — HOSPITAL ENCOUNTER (OUTPATIENT)
Dept: PHYSICAL THERAPY | Age: 56
Setting detail: THERAPIES SERIES
Discharge: HOME | End: 2021-10-18
Payer: COMMERCIAL

## 2021-10-18 PROCEDURE — 97530 THERAPEUTIC ACTIVITIES: CPT

## 2021-10-18 PROCEDURE — 97016 VASOPNEUMATIC DEVICE THERAPY: CPT

## 2021-10-18 PROCEDURE — 97110 THERAPEUTIC EXERCISES: CPT

## 2021-10-18 NOTE — FLOWSHEET NOTE
Outpatient Physical Therapy  [place]           [x] Phone: [phone]   Fax: [phone]  [place]           [] Phone: [phone]   Fax: [phone]        Physical Therapy Daily Treatment Note  Date:  10/18/2021    Patient Name:  Gemma Snyder    :  1965  MRN: [id number]  Restrictions/Precautions: Other position/activity restrictions: postop R TKA  Diagnosis:   Diagnosis: R TKA  Date of Injury/Surgery: 10/6/21  Treatment Diagnosis: Treatment Diagnosis: R knee pain, stiffness, weakness, altered gait    Insurance/Certification information: PT Insurance Information: Medical [place] 20 PCY --and considering other knee so watch visits please  Referring Physician:  Referring Practitioner: Kathi Delgado  Next Doctor Visit:    Plan of care signed (Y/N):  yes  Outcome Measure: KOOS 61%  Visit# / total visits:   3/12 POC   Pain level: 2-3/10   Goals:     Patient goals : walking and running w/o pain/limit, no device, get it bending again. Short term goals  Time Frame for Short term goals: 3 weeks  Short term goal 1: 3 weeks  Short term goal 2: Pt will be able to walk w/ SPC or less at least 50% of the time  Partially met, can in clinic  Short term goal 3: Pt will be able to achieve at least 100° flex for ease of gait and stairs   Progressing  Short term goal 4: Pt will have pain <5/10 at least 50% of the time  Mostly met  Long term goals  Time Frame for Long term goals : 6 weeks  Long term goal 1: 6 weeks  Long term goal 2: Pt will be able to walk w/o device at least 75% of the time w/o R knee pain  Long term goal 3: Pt will be able to go up/down stairs reciprocally w/o pain or limit  Long term goal 4: Pt will have improved KOOS score by 20% or more    Summary of Evaluation: Assessment: Pt is a 59 yo female who presents to PT 5 days postop R TKA. She has not Hx prior knee surgery but L knee is needing replaced as well.   She is ambulating w/ 2WW w/ good WB, has moderate edema, limited ROM and strength in R LE.  These limitations are affecting her ability to work and perform usual daily activity. She will benefit from PTto help restore ROM, strength, gait and PLOF. Prior to 2020 she was walking w/ min limp or pain. Subjective:  Cramps at night time, but better during day, feels like it is slowly getting better. Max pain lately 2-3/10. Any changes in Ambulatory Summary Sheet? None        Objective:  COVID screening questions were asked and patient attested that there had been no contact or symptoms    Antalgic gait with decrease hip and knee flexion   AROM: 0-95  Pt needs some cues for hip abd, leg wanting to drop into more hip flex. Pt does very well w/ SPC this date, steady and good gait [facility]. Exercises: (No more than 4 columns)   Exercise/Equipment 10/11/21  #1 10/15/21 #2 10/18/21  #3           WARM UP       Nustep    S/UE at 8, lev 2, 5'         TABLE      QS   Towel under the knee 5ct x10 No towel 20x5\"   SLR   Next 10x3    Hip abd S/L   10x2, cues   Calf/ham stretch long sit Strap 30\" x2 Strap 30\" x2 Man    Heel slide  Strap 10x AAROM Strap 10x AAROM Strap 10x2    Seated LAQ  Flex  10x  15x 10x2 20x 1-2\" hold  20x PT OP   SAQ  With half foam roll under her thigh 2x10  Full roll, 10x2, 2\" hold                  STANDING      Heel raise  10x 2x10 20x   march 10x   30 sec x2   STS  W/ walker 10x BUE assist no walker 2x10 22' 10x2, no UE    HS curl  Less than 90 degrees 2x10 10x2    Gait training  Gait training with [facility] AND [place] today CGA: cues for four point step to sequence, increase toe push off, and increase stance time on RLE x1 lap in gym [facility] 200ft    Ant step up   -- 4\" 10x2                           PROPRIOCEPTION                                    MODALITIES      Vaso  15' Denied today 10'             Other Therapeutic Activities/Education:  10/11/2021: Patient received education on their current pathology and how their condition effects them with their functional activities.  Patient

## 2021-10-22 ENCOUNTER — HOSPITAL ENCOUNTER (OUTPATIENT)
Dept: PHYSICAL THERAPY | Age: 56
Setting detail: THERAPIES SERIES
Discharge: HOME | End: 2021-10-22
Payer: COMMERCIAL

## 2021-10-22 PROCEDURE — 97016 VASOPNEUMATIC DEVICE THERAPY: CPT

## 2021-10-22 PROCEDURE — 97530 THERAPEUTIC ACTIVITIES: CPT

## 2021-10-22 PROCEDURE — 97110 THERAPEUTIC EXERCISES: CPT

## 2021-10-22 NOTE — FLOWSHEET NOTE
Outpatient Physical Therapy  [place]           [x] Phone: [phone]   Fax: [phone]  [facility]           [] Phone: [phone]   Fax: [phone]        Physical Therapy Daily Treatment Note  Date:  10/22/2021    Patient Name:  Milli Aleman    :  1965  MRN: [id number]  Restrictions/Precautions: Other position/activity restrictions: postop R TKA  Diagnosis:   Diagnosis: R TKA  Date of Injury/Surgery: 10/6/21  Treatment Diagnosis: Treatment Diagnosis: R knee pain, stiffness, weakness, altered gait    Insurance/Certification information: PT Insurance Information: Medical [place] 20 PCY --and considering other knee so watch visits please  Referring Physician:  Referring Practitioner: Andrew Chacon  Next Doctor Visit:    Plan of care signed (Y/N):  yes  Outcome Measure: KOOS 61%  Visit# / total visits:    POC   Pain level: 0/10   Goals:     Patient goals : walking and running w/o pain/limit, no device, get it bending again. Short term goals  Time Frame for Short term goals: 3 weeks  Short term goal 1: 3 weeks  Short term goal 2: Pt will be able to walk w/ SPC or less at least 50% of the time  Partially met, can in clinic  Short term goal 3: Pt will be able to achieve at least 100° flex for ease of gait and stairs   Progressing  Short term goal 4: Pt will have pain <5/10 at least 50% of the time  Mostly met  Long term goals  Time Frame for Long term goals : 6 weeks  Long term goal 1: 6 weeks  Long term goal 2: Pt will be able to walk w/o device at least 75% of the time w/o R knee pain  Long term goal 3: Pt will be able to go up/down stairs reciprocally w/o pain or limit  Long term goal 4: Pt will have improved KOOS score by 20% or more    Summary of Evaluation: Assessment: Pt is a 59 yo female who presents to PT 5 days postop R TKA. She has not Hx prior knee surgery but L knee is needing replaced as well.   She is ambulating w/ 2WW w/ good WB, has moderate edema, limited ROM and strength in R LE. These limitations are affecting her ability to work and perform usual daily activity. She will benefit from PTto help restore ROM, strength, gait and PLOF. Prior to 2020 she was walking w/ min limp or pain. Subjective:  She arrives with no pain today. She states that most of her discomfort is from increase stiffness from sitting too long, usually a 1-2/10. Any changes in Ambulatory Summary Sheet? None        Objective:  COVID screening questions were asked and patient attested that there had been no contact or symptoms    Antalgic gait with decrease hip and knee flexion, SPC cane adjusted to her height today  AROM: 0-95  Pt does very well w/ SPC this date, steady and good gait [facility].         Exercises: (No more than 4 columns)   Exercise/Equipment 10/11/21  #1 10/15/21 #2 10/18/21  #3 10/22/21            WARM UP        Nustep    S/UE at 8, lev 2, 5' S/UE at 8, lev 2, 5'          TABLE       QS   Towel under the knee 5ct x10 No towel 20x5\" No towel 20x5\"   SLR   Next 10x3  10x3    Hip abd S/L   10x2, cues    Calf/ham stretch long sit Strap 30\" x2 Strap 30\" x2 Man  Man    Heel slide  Strap 10x AAROM Strap 10x AAROM Strap 10x2  Strap 10x2    Seated LAQ  Flex  10x  15x 10x2 20x 1-2\" hold  20x PT OP 20x 1-2\" hold  20x PT OP   SAQ  With half foam roll under her thigh 2x10  Full roll, 10x2, 2\" hold Full roll, 10x2, 2\" hold                    STANDING       Heel raise  10x 2x10 20x 2x10   march 10x   30 sec x2    STS  W/ walker 10x BUE assist no walker 2x10 22' 10x2, no UE     HS stretch    2x30\"    HS curl  Less than 90 degrees 2x10 10x2  2x10   Gait training  Gait training with [facility] [place] today CGA: cues for four point step to sequence, increase toe push off, and increase stance time on RLE x1 lap in gym SPC 200ft  SPC 1 lap   Ant step up   -- 4\" 10x2     Hip flex and abduction    2x10                      PROPRIOCEPTION       Rocker board    2x30 ea dir                               MODALITIES       Vaso  15' Denied today 8' 10'              Other Therapeutic Activities/Education:  10/11/2021: Patient received education on their current pathology and how their condition effects them with their functional activities. Patient understood discussion and questions were answered. Patient understands their activity limitations and understands rational for treatment progression. 10/18: get a cane for home use and start out using inside the home and when comfortable can begin outside the home too. Home Exercise Program:  Issued, practiced and pt demo ability to perform 10/11/2021       Manual Treatments:  PROM, stretches, light STM       Modalities:  Vaso low pressure to R knee, pt recumb w/ leg on pillow, 10'      Communication with other providers:  POC set for cosign 10/11/21      Assessment:  Galina tolerates all activities well. HS curls today demo HS tightness, standing HS stretch to relieve this tightness inbetween sets was effective. She is unable to do SLS on either LE for more than 5sec but standing balance on rocker board is fair tolerating about 25secs. She just has a slight increase in pain which is mostly from the slight therapist OP with sitting knee flexion. Pt is progressing well postop TKA but continues w/ ROM, strength and gait deficits. She will continue to benefit from PT to help restore her motion, strength and function and return to maximal functional ability.      Plan for Next Session:  progress ROM, strength, gait, balance to yanni, pain and edema control      Time In / Time Out:  1115/1205        Timed Code/Total Treatment Minutes:  40'/50' 1 Vaso 10', 1 TA 10', 2 TE 30'    Next Progress Note due:  30 days      Plan of Care Interventions:  [x] Therapeutic Exercise  [x] Modalities:  [x] Therapeutic Activity     [] Ultrasound  [] Estim  [x] Gait Training      [] Cervical Traction [] Lumbar Traction  [x] Neuromuscular Re-education    [x] Cold/hotpack [] Iontophoresis   [x] Instruction in HEP      [x] Vasopneumatic   [] Dry Needling    [x] Manual Therapy               [] Aquatic Therapy              Electronically signed by:   Shantel Jimenez PTA,  10/22/2021,11:16 AM

## 2021-10-25 ENCOUNTER — HOSPITAL ENCOUNTER (OUTPATIENT)
Dept: PHYSICAL THERAPY | Age: 56
Setting detail: THERAPIES SERIES
Discharge: HOME | End: 2021-10-25
Payer: COMMERCIAL

## 2021-10-25 PROCEDURE — 97110 THERAPEUTIC EXERCISES: CPT

## 2021-10-25 PROCEDURE — 97016 VASOPNEUMATIC DEVICE THERAPY: CPT

## 2021-10-25 PROCEDURE — 97140 MANUAL THERAPY 1/> REGIONS: CPT

## 2021-10-25 NOTE — FLOWSHEET NOTE
Outpatient Physical Therapy  [place]           [x] Phone: [phone]   Fax: [phone]  [facility]           [] Phone: [phone]   Fax: [phone]        Physical Therapy Daily Treatment Note  Date:  10/25/2021    Patient Name:  Elijah Chapman    :  1965  MRN: [id number]  Restrictions/Precautions: Other position/activity restrictions: postop R TKA  Diagnosis:   Diagnosis: R TKA  Date of Injury/Surgery: 10/6/21  Treatment Diagnosis: Treatment Diagnosis: R knee pain, stiffness, weakness, altered gait    Insurance/Certification information: PT Insurance Information: Medical [place] 20 PCY --and considering other knee so watch visits please  Referring Physician:  Referring Practitioner: Nan Guerra  Next Doctor Visit:    Plan of care signed (Y/N):  yes  Outcome Measure: KOOS 61%  Visit# / total visits:    POC   Pain level: 0/10       Goals:     Patient goals : walking and running w/o pain/limit, no device, get it bending again. Short term goals  Time Frame for Short term goals: 3 weeks  Short term goal 1: 3 weeks  Short term goal 2: Pt will be able to walk w/ SPC or less at least 50% of the time  Partially met, can in clinic  Short term goal 3: Pt will be able to achieve at least 100° flex for ease of gait and stairs   Met! 10/25  Short term goal 4: Pt will have pain <5/10 at least 50% of the time  Mostly met  Long term goals  Time Frame for Long term goals : 6 weeks  Long term goal 1: 6 weeks  Long term goal 2: Pt will be able to walk w/o device at least 75% of the time w/o R knee pain  Long term goal 3: Pt will be able to go up/down stairs reciprocally w/o pain or limit  Long term goal 4: Pt will have improved KOOS score by 20% or more    Summary of Evaluation: Assessment: Pt is a 61 yo female who presents to PT 5 days postop R TKA. She has not Hx prior knee surgery but L knee is needing replaced as well.   She is ambulating w/ 2WW w/ good WB, has moderate edema, limited ROM and strength in R LE.  These limitations are affecting her ability to work and perform usual daily activity. She will benefit from PTto help restore ROM, strength, gait and PLOF. Prior to 2020 she was walking w/ min limp or pain. Subjective:  Jeffy Wolff arrives to therapy stating that the knee feels okay, no pain currently. Gets stiff and achy at night. Any changes in Ambulatory Summary Sheet? None        Objective:  COVID screening questions were asked and patient attested that there had been no contact or symptoms    AROM  Flexion 115°; 121° after manual   Extension 0°    Slight increase in pain with hip abduction when the R was the stance leg. Exercises: (No more than 4 columns)   Exercise/Equipment 10/18/21  #3 10/22/21 10/25/2021 #5           WARM UP       Nustep  S/UE at 8, lev 2, 5' S/UE at 8, lev 2, 5' 5'         TABLE      QS  No towel 20x5\" No towel 20x5\" 10*5\" in long sit focusing on superior translation of patella    SLR  10x3  10x3  2*10    Hip abd S/L 10x2, cues     Calf/ham stretch long sit Man  Man  --   Heel slide  Strap 10x2  Strap 10x2  --   Seated LAQ  Flex  20x 1-2\" hold  20x PT OP 20x 1-2\" hold  20x PT OP --   SAQ Full roll, 10x2, 2\" hold Full roll, 10x2, 2\" hold 2*10                  STANDING      Heel raise  20x 2x10 2*10   march 30 sec x2  2*10 steps alternating LE's    STS  22' 10x2, no UE   10*   HS stretch  2x30\"  --   HS curl 10x2  2x10 2*10   Gait training SPC 200ft  SPC 1 lap --   Ant step up  4\" 10x2   Next    Hip flex and abduction  2x10 Abduction only 10* ea LE                     PROPRIOCEPTION      Rocker board  2x30 ea dir 2*30\" ea dir                            MODALITIES      Vaso  10' 10' 10'             Other Therapeutic Activities/Education:  10/11/2021: Patient received education on their current pathology and how their condition effects them with their functional activities. Patient understood discussion and questions were answered.  Patient understands their activity limitations and understands rational for treatment progression. 10/18: get a cane for home use and start out using inside the home and when comfortable can begin outside the home too. Home Exercise Program:  Issued, practiced and pt demo ability to perform 10/11/2021       Manual Treatments: PROM into flexion, prone quad stretching, HS stretching      Modalities:  Vaso low pressure to R knee, pt recumb w/ leg on pillow, 10'      Communication with other providers:  POC set for cosign 10/11/21      Assessment:   Gracie Coleman is making very good progress towards her goals. She has achieved full terminal knee extension and her flexion ROM has improved significantly. She has some difficulty with quad set, but seems to have similar difficulty on the opposite side. She also has some increased pain laterally with full WB on the R LE. She will continue to benefit from skilled PT services in order to address these deficits.  End session pain: 0/10    Plan for Next Session:  progress ROM, strength, gait, balance to yanni, pain and edema control      Time In / Time Out:   1100/1146       Timed Code/Total Treatment Minutes:  36'/46' 1 man 10' 1 vaso 10' 1 TE 26'    Next Progress Note due:  30 days      Plan of Care Interventions:  [x] Therapeutic Exercise  [x] Modalities:  [x] Therapeutic Activity     [] Ultrasound  [] Estim  [x] Gait Training      [] Cervical Traction [] Lumbar Traction  [x] Neuromuscular Re-education    [x] Cold/hotpack [] Iontophoresis   [x] Instruction in HEP      [x] Vasopneumatic   [] Dry Needling    [x] Manual Therapy               [] Aquatic Therapy              Electronically signed by:  Maribell Lerner PTA     10/25/2021,9:25 AM

## 2021-10-26 ENCOUNTER — OFFICE VISIT (OUTPATIENT)
Dept: ORTHOPEDIC SURGERY | Age: 56
End: 2021-10-26
Payer: COMMERCIAL

## 2021-10-26 VITALS
RESPIRATION RATE: 16 BRPM | WEIGHT: 154 LBS | HEART RATE: 88 BPM | BODY MASS INDEX: 29.07 KG/M2 | HEIGHT: 61 IN | OXYGEN SATURATION: 100 %

## 2021-10-26 DIAGNOSIS — M17.12 PRIMARY OSTEOARTHRITIS OF LEFT KNEE: Primary | ICD-10-CM

## 2021-10-26 DIAGNOSIS — Z96.651 S/P TOTAL KNEE REPLACEMENT, RIGHT: ICD-10-CM

## 2021-10-26 DIAGNOSIS — Z01.812 ENCOUNTER FOR PREOPERATIVE SCREENING LABORATORY TESTING FOR COVID-19 VIRUS: ICD-10-CM

## 2021-10-26 DIAGNOSIS — Z20.822 ENCOUNTER FOR PREOPERATIVE SCREENING LABORATORY TESTING FOR COVID-19 VIRUS: ICD-10-CM

## 2021-10-26 PROCEDURE — 99214 OFFICE O/P EST MOD 30 MIN: CPT | Performed by: ORTHOPAEDIC SURGERY

## 2021-10-26 NOTE — PATIENT INSTRUCTIONS
Weightbearing and activities as tolerated  May take Ibuprofen or Motrin as needed  Rest, ice, and elevate as needed  Work on ROM and strengthening exercises as discussed  Removed laura  Follow up in 4 weeks     Surgery discussed in office  Consent signed  Patient has been advised when to stop medications and herbs that affect bleeding and voiced understanding  Follow up for surgery as scheduled for   You may call Olive Sutherland, our Surgery Scheduler, with any further questions or concerns regarding your upcoming procedure at [phone]

## 2021-10-28 PROBLEM — M17.0 BILATERAL PRIMARY OSTEOARTHRITIS OF KNEE: Status: RESOLVED | Noted: 2021-01-21 | Resolved: 2021-10-28

## 2021-10-28 PROBLEM — M17.11 PRIMARY OSTEOARTHRITIS OF RIGHT KNEE: Status: RESOLVED | Noted: 2021-10-06 | Resolved: 2021-10-28

## 2021-10-28 NOTE — PROGRESS NOTES
10/26/2021   Chief Complaint   Patient presents with    Post-Op Check     Right TKA, DOS: 10/06/21   Worsening left knee pain    History of Present Illness:                             Heath Almazan is a 60 y.o. female returns today for follow-up of her right total knee replacement and also further discussion of her worsening left knee pain. She has done well through advancing her activities with physical therapy and feels that her right knee is making good progress. She has noticed some increasing discomfort and pain and swelling along the left knee while doing the therapy exercises. She feels somewhat unstable on the left knee like it wants to shift or give out on her when doing single leg bands and weightbearing activities. Pain is most significant along the medial joint line. Pain is constant throughout the day of the left knee and worse with repetitive activities    Pt, Ciaran Ovalle is a 60 y.o. female returning to the office post-operative for a right TKA DOS 10/16/21. Pt states she has pain after rehab but normally no pain. Pt has an increased ROM. Pt states she has stiffness at night. Pt is treating pain with aspirin with moderate relief. Medical History  Patient's medications, allergies, past medical, surgical, social and family histories were reviewed and updated as appropriate. Past Medical History:   Diagnosis Date    Arthritis      Past Surgical History:   Procedure Laterality Date    JOINT REPLACEMENT      scheduled 10/6/21 for right knee    TOTAL KNEE ARTHROPLASTY Right 10/6/2021    RIGHT KNEE TOTAL ARTHROPLASTY ROBOTIC performed by Jed Falcon [facility] at [facility] OR     No family history on file.   Social History     Socioeconomic History    Marital status:      Spouse name: Pratibha Emanuel Number of children: 2    Years of education: 12    Highest education level: Not on file   Occupational History    Not on file   Tobacco Use    Smoking status: Never Smoker    Smokeless tobacco: Never Used   Substance and Sexual Activity    Alcohol use: Yes     Comment: occasionally    Drug use: No    Sexual activity: Yes     Partners: Male   Other Topics Concern    Not on file   Social History Narrative    Not on file     Social Determinants of Health     Financial Resource Strain:     Difficulty of Paying Living Expenses:    Food Insecurity:     Worried About Running Out of Food in the Last Year:     [address] in the Last Year:    Transportation Needs:     Lack of Transportation (Medical):  Lack of Transportation (Non-Medical):    Physical Activity:     Days of Exercise per Week:     Minutes of Exercise per Session:    Stress:     Feeling of Stress :    Social Connections:     Frequency of Communication with Friends and Family:     Frequency of Social Gatherings with Friends and Family:     Attends [employer] Services:     Active Member of Clubs or Organizations:     Attends Club or Organization Meetings:     Marital Status:    Intimate Partner Violence:     Fear of Current or Ex-Partner:     Emotionally Abused:     Physically Abused:     Sexually Abused:      Current Outpatient Medications   Medication Sig Dispense Refill    ferrous sulfate (IRON 325) 325 (65 Fe) MG tablet Take 325 mg by mouth daily (with breakfast)      calcium carbonate (OYSTER SHELL CALCIUM 500 MG) 1250 (500 Ca) MG tablet Take by mouth      Cholecalciferol (VITAMIN D3) 1.25 MG (48035 UT) CAPS TAKE ONE CAPSULE BY MOUTH ONCE A WEEK      aspirin 325 MG EC tablet Take 1 tablet by mouth 2 times daily (Patient not taking: Reported on 10/26/2021) 30 tablet 0    Cholecalciferol (VITAMIN D) 10 MCG (400 UNIT) CAPS Capsule Take 2 capsules by mouth      celecoxib (CELEBREX) 200 MG capsule Take 1 capsule by mouth daily 60 capsule 2     No current facility-administered medications for this visit.      No Known Allergies      Review of Systems Examination:  General Exam:  Vitals: Pulse 88   Resp 16   Ht 5' 1\" (1.549 m)   Wt 154 lb (69.9 kg)   SpO2 100%   BMI 29.10 kg/m²    Physical Exam  Vitals and nursing note reviewed. Constitutional:       Appearance: Normal appearance. HENT:      Head: Normocephalic and atraumatic. Eyes:      Conjunctiva/sclera: Conjunctivae normal.      Pupils: Pupils are equal, round, and reactive to light. Pulmonary:      Effort: Pulmonary effort is normal.   Musculoskeletal:      Cervical back: Normal range of motion. Right hip: Normal.      Left hip: No deformity, tenderness, bony tenderness or crepitus. Normal range of motion. Normal strength. Right knee: No swelling, deformity, effusion, erythema, ecchymosis or bony tenderness. Normal range of motion. No medial joint line or lateral joint line tenderness. No LCL laxity or MCL laxity. Normal alignment and normal patellar mobility. Comments: Right Lower Extremity:    The incision is healing well and skin edges are well approximated. No erythema, drainage, or induration. Mild soft tissue swelling present throughout the soft tissues of the knee. Range of motion is present from full extension to 120 degrees of flexion. Calf is soft and nontender. Negative Homans sign. Sensation and motor function is intact at the knee and ankle. Left Lower Extremity:  There is moderate to severe tenderness to palpation throughout the knee most significant along the medial joint line. There is a mild effusion present and mild global swelling at the knee anteriorly. Mild restricted range of motion at the knee with approximately 3 degrees short of full extension and knee flexion up to 130 degrees with pain at the extremes of motion. There is mild crepitation at the knee during active range of motion. There is mild varus knee alignment. There is 5 out of 5 strength with knee flexion and extension.   There is no instability to varus or valgus stress testing or anterior and posterior drawer testing. Sensation is intact to light touch throughout the lower extremity. There is a moderately positive James's test with tenderness to palpation and pain along the medial joint line. Skin is intact. Pulses intact    No pain with active range of motion of the hip. Strength and range of motion of the hip are intact. No tenderness to palpation at the hip. Skin:     General: Skin is warm and dry. Neurological:      Mental Status: She is alert and oriented to person, place, and time. Psychiatric:         Mood and Affect: Mood normal.         Behavior: Behavior normal.            Diagnostic testing:  X-ray images were reviewed by myself and discussed with the patient:    Narrative   3 views of the right knee show excellent position and alignment of the    total knee replacement with good bone cement interface and no evidence of    fracture or complication.  Mild anterior soft tissue swelling present. X-ray images of the left knee from 9/7/2021:  4 views of the Left Knee:       There is evidence of moderate to severe degenerative changes present in    all 3 compartments of the knee most significant along the medial    compartment where there is advanced joint space collapse and near bone on    bone articulation with mild osteophyte formation.  There is subchondral    sclerosis present in medial compartment with cortical irregularities on    both sides of the joint.  There is a moderate varus alignment present.     There are prominent arthritic changes in the patellofemoral joint with    joint space narrowing and osteophyte formation.  Mild soft tissue swelling    present at the knee joint.  No fractures or loose bodies identified.  Mild    decreased bone mineral density throughout       Impression:   Severe varus tricompartmental arthritis             Office Procedures:  No orders of the defined types were placed in this encounter. Assessment and Plan  1.   Left assisted total knee replacement. She will be on aspirin postoperatively for DVT prophylaxis.       Electronically signed by Dominga Lakhani MD on 10/28/2021 at 8:37 AM

## 2021-11-01 ENCOUNTER — HOSPITAL ENCOUNTER (OUTPATIENT)
Dept: PHYSICAL THERAPY | Age: 56
Setting detail: THERAPIES SERIES
Discharge: HOME | End: 2021-11-01
Payer: COMMERCIAL

## 2021-11-01 PROCEDURE — 97110 THERAPEUTIC EXERCISES: CPT

## 2021-11-01 PROCEDURE — 97530 THERAPEUTIC ACTIVITIES: CPT

## 2021-11-01 PROCEDURE — 97016 VASOPNEUMATIC DEVICE THERAPY: CPT

## 2021-11-01 PROCEDURE — 97140 MANUAL THERAPY 1/> REGIONS: CPT

## 2021-11-01 NOTE — FLOWSHEET NOTE
Outpatient Physical Therapy  [place]           [x] Phone: [phone]   Fax: [phone]  Jennifer Rae           [] Phone: [phone]   Fax: [phone]        Physical Therapy Daily Treatment Note  Date:  2021    Patient Name:  Milli Aleman    :  1965  MRN: [id number]  Restrictions/Precautions: Other position/activity restrictions: postop R TKA  Diagnosis:   Diagnosis: R TKA  Date of Injury/Surgery: 10/6/21  Treatment Diagnosis: Treatment Diagnosis: R knee pain, stiffness, weakness, altered gait    Insurance/Certification information: PT Insurance Information: [employer] [place] 20 PCY --and considering other knee so watch visits please  Referring Physician:  Referring Practitioner: Andrew Chacon  Next Doctor Visit:    Plan of care signed (Y/N):  yes  Outcome Measure: KOOS 61%  Visit# / total visits:    POC   Pain level: 0/10       Goals:     Patient goals : walking and running w/o pain/limit, no device, get it bending again. Short term goals  Time Frame for Short term goals: 3 weeks  Short term goal 1: 3 weeks  Short term goal 2: Pt will be able to walk w/ SPC or less at least 50% of the time  Partially met, can in clinic  Short term goal 3: Pt will be able to achieve at least 100° flex for ease of gait and stairs   Met! 10/25  Short term goal 4: Pt will have pain <5/10 at least 50% of the time  Mostly met  Long term goals  Time Frame for Long term goals : 6 weeks  Long term goal 1: 6 weeks  Long term goal 2: Pt will be able to walk w/o device at least 75% of the time w/o R knee pain  Long term goal 3: Pt will be able to go up/down stairs reciprocally w/o pain or limit  Long term goal 4: Pt will have improved KOOS score by 20% or more    Summary of Evaluation: Assessment: Pt is a 61 yo female who presents to PT 5 days postop R TKA. She has not Hx prior knee surgery but L knee is needing replaced as well.   She is ambulating w/ 2WW w/ good WB, has moderate edema, limited ROM and strength in R LE.  These limitations are affecting her ability to work and perform usual daily activity. She will benefit from PTto help restore ROM, strength, gait and PLOF. Prior to 2020 she was walking w/ min limp or pain. Subjective:  Cr Farooq arrives to therapy with no pain but stiffness. She states that if she is standing or sitting too long she gets stiff and achy. Her doctor relays through her that she needs to work on activities that mimic breaking and accelerating for driving. Any changes in Ambulatory Summary Sheet? None        Objective:  COVID screening questions were asked and patient attested that there had been no contact or symptoms    AROM  Flexion 115° before and after manual.   Extension 0°    Slight increase in pain with hip abduction when the R was the stance leg.        Exercises: (No more than 4 columns)   Exercise/Equipment 10/18/21  #3 10/22/21 10/25/2021 #5 11/01/21 #6            WARM UP        Nustep  S/UE at 8, lev 2, 5' S/UE at 8, lev 2, 5' 5' 5' lvl 5          TABLE       QS  No towel 20x5\" No towel 20x5\" 10*5\" in long sit focusing on superior translation of patella  10*5\" in long sit focusing on superior translation of patella    SLR  10x3  10x3  2*10  2*10    Hip abd S/L 10x2, cues      Calf/ham stretch long sit Man  Man  --    Heel slide  Strap 10x2  Strap 10x2  --    Seated LAQ  Flex  20x 1-2\" hold  20x PT OP 20x 1-2\" hold  20x PT OP -- 2x10    SAQ Full roll, 10x2, 2\" hold Full roll, 10x2, 2\" hold 2*10    fitter    2 BC 2x10             STANDING       Heel raise  20x 2x10 2*10 2*10   march 30 sec x2  2*10 steps alternating LE's     STS  22' 10x2, no UE   10* 10*   HS stretch  2x30\"  --    HS curl 10x2  2x10 2*10 2*10 with 2# weight   Gait training SPC 200ft  SPC 1 lap --    Ant step up  4\" 10x2   Next  2x10 6\" step   Hip flex and abduction  2x10 Abduction only 10* ea LE                        PROPRIOCEPTION       Rocker board  2x30 ea dir 2*30\" ea dir MODALITIES       Vaso  10' 10' 10' 10'              Other Therapeutic Activities/Education:  10/11/2021: Patient received education on their current pathology and how their condition effects them with their functional activities. Patient understood discussion and questions were answered. Patient understands their activity limitations and understands rational for treatment progression. 10/18: get a cane for home use and start out using inside the home and when comfortable can begin outside the home too. Home Exercise Program:  Issued, practiced and pt demo ability to perform 10/11/2021       Manual Treatments: PROM into flexion, prone quad stretching, HS stretching      Modalities:  Vaso low pressure to R knee, pt recumb w/ leg on pillow, 10'      Communication with other providers:  POC set for cosign 10/11/21      Assessment:   Eliezer Dandy is making very good progress towards her goals. She has achieved full terminal knee extension and her flexion ROM has improved significantly. Pain with her end rand knee flexion, 3/10, but resolved with rest. Minimal substitutions with activities today: cues for full WB with STS and cues to decrease hip hike with abduction with standing HS curls. These improved after cues and repetition. Plan for Next Session:  progress ROM, strength, gait, balance to yanni, pain and edema control      Time In / Time Out:   0950/1040       Timed Code/Total Treatment Minutes:  40'/50' 1 Vaso 10', 1 MT 10', 1 TE 10', 1 TA 20'    Next Progress Note due:  30 days      Plan of Care Interventions:  [x] Therapeutic Exercise  [x] Modalities:  [x] Therapeutic Activity     [] Ultrasound  [] Estim  [x] Gait Training      [] Cervical Traction [] Lumbar Traction  [x] Neuromuscular Re-education    [x] Cold/hotpack [] Iontophoresis   [x] Instruction in HEP      [x] Vasopneumatic   [] Dry Needling    [x] Manual Therapy               [] Aquatic Therapy              Electronically signed by:   Andrea Graff PTA,      11/1/2021 12:16 PM

## 2021-11-02 ENCOUNTER — HOSPITAL ENCOUNTER (OUTPATIENT)
Dept: CT IMAGING | Age: 56
Discharge: HOME | End: 2021-11-02
Payer: COMMERCIAL

## 2021-11-02 DIAGNOSIS — M17.12 PRIMARY OSTEOARTHRITIS OF LEFT KNEE: ICD-10-CM

## 2021-11-02 PROCEDURE — 73700 CT LOWER EXTREMITY W/O DYE: CPT

## 2021-11-05 ENCOUNTER — HOSPITAL ENCOUNTER (OUTPATIENT)
Dept: PHYSICAL THERAPY | Age: 56
Setting detail: THERAPIES SERIES
Discharge: HOME | End: 2021-11-05
Payer: COMMERCIAL

## 2021-11-05 PROCEDURE — 97530 THERAPEUTIC ACTIVITIES: CPT

## 2021-11-05 PROCEDURE — 97016 VASOPNEUMATIC DEVICE THERAPY: CPT

## 2021-11-05 PROCEDURE — 97110 THERAPEUTIC EXERCISES: CPT

## 2021-11-05 PROCEDURE — 97140 MANUAL THERAPY 1/> REGIONS: CPT

## 2021-11-05 NOTE — FLOWSHEET NOTE
Outpatient Physical Therapy  [place]           [x] Phone: [phone]   Fax: [phone]  Ebenezer Carrington           [] Phone: [phone]   Fax: [phone]        Physical Therapy Daily Treatment Note  Date:  2021    Patient Name:  Gerry Zuñiga    :  1965  MRN: [id number]  Restrictions/Precautions: Other position/activity restrictions: postop R TKA  Diagnosis:   Diagnosis: R TKA  Date of Injury/Surgery: 10/6/21  Treatment Diagnosis: Treatment Diagnosis: R knee pain, stiffness, weakness, altered gait    Insurance/Certification information: PT Insurance Information: [employer] [place] 20 PCY --and considering other knee so watch visits please  Referring Physician:  Referring Practitioner: Radha Newman  Next Doctor Visit:    Plan of care signed (Y/N):  yes  Outcome Measure: KOOS 61%  Visit# / total visits:    POC   Pain level: 0-1/10       Goals:     Patient goals : walking and running w/o pain/limit, no device, get it bending again. Short term goals  Time Frame for Short term goals: 3 weeks  Short term goal 1: 3 weeks  Short term goal 2: Pt will be able to walk w/ SPC or less at least 50% of the time  Met  Short term goal 3: Pt will be able to achieve at least 100° flex for ease of gait and stairs   Met 10/25  Short term goal 4: Pt will have pain <5/10 at least 50% of the time  Met  Long term goals  Time Frame for Long term goals : 6 weeks  Long term goal 1: 6 weeks  Long term goal 2: Pt will be able to walk w/o device at least 75% of the time w/o R knee pain  Met  Long term goal 3: Pt will be able to go up/down stairs reciprocally w/o pain or limit  Progressing   Long term goal 4: Pt will have improved KOOS score by 20% or more    Summary of Evaluation: Assessment: Pt is a 61 yo female who presents to PT 5 days postop R TKA. She has not Hx prior knee surgery but L knee is needing replaced as well. She is ambulating w/ 2WW w/ good WB, has moderate edema, limited ROM and strength in R LE.   These limitations are affecting her ability to work and perform usual daily activity. She will benefit from PTto help restore ROM, strength, gait and PLOF. Prior to 2020 she was walking w/ min limp or pain. Subjective:   More stiffness than pain for the most part, some swelling still. Any changes in Ambulatory Summary Sheet? None        Objective:  COVID screening questions were asked and patient attested that there had been no contact or symptoms    Pt ambulates w/ slight limp but no device   Needs mod to max cues to get S/L hip abd mechanics correct  AROM  Flexion 122°    Extension 0°           Exercises: (No more than 4 columns)   Exercise/Equipment 10/25/2021 #5 11/01/21 #6 11/5/21  #7             WARM UP        Nustep  5' 5' lvl 5 --    Rec bike   -- Vision bike 13,            TABLE       QS  10*5\" in long sit focusing on superior translation of patella  10*5\" in long sit focusing on superior translation of patella  W/ SLR     SLR  2*10  2*10  10x3 w/ QS    Hip abd S/L   10x3     Calf/ham stretch long sit --  Man     Heel slide  --  Strap 10x     Seated LAQ  Flex  -- 2x10  --    SAQ 2*10  Bball 20x     Bridge   -- 10x2     Fitter knee ext   Flex   2 BC 2x10 2 blue 20x  1 blue 20x              STANDING       Heel raise  2*10 2*10 FR 20x     march 2*10 steps alternating LE's   --    STS  10* 10* 22\" 10x2     HS stretch --  --    HS curl 2*10 2*10 with 2# weight --    Ant step up  Next  2x10 6\" step 6\" 10x2     Hip flex and abduction Abduction only 10* ea LE   --                       PROPRIOCEPTION       Rocker board balance 2*30\" ea dir   30\"x2 ea way                                MODALITIES       Vaso  10' 10' 10'               Other Therapeutic Activities/Education:  10/11/2021: Patient received education on their current pathology and how their condition effects them with their functional activities. Patient understood discussion and questions were answered.  Patient understands their activity limitations and understands rational for treatment progression. 10/18: get a cane for home use and start out using inside the home and when comfortable can begin outside the home too. Home Exercise Program:  Issued, practiced and pt demo ability to perform 10/11/2021       Manual Treatments: PROM into flexion, prone quad stretching, HS stretching      Modalities:  Vaso low pressure to R knee, pt recumb w/ leg on pillow, 10'      Communication with other providers:  POC set for cosign 10/11/21      Assessment:   Ivan Fountain is making very good progress towards her goals. She has mild ROM deficits at this time w/ weakness still and decreased activity tolerance/functional strength and balance. She will continue to benefit from PT to help restore maximal functional ability and prepare for surgery on L LE. Pain after Rx  0/10.     Plan for Next Session:  progress ROM, strength, gait, balance to yanni, pain and edema control      Time In / Time Out:  0700/750         Timed Code/Total Treatment Minutes:   40/50   1 MT 10', 1 TE 10', 1 TA 20'   1 Vaso 10'    Next Progress Note due:  30 days   ST do 11/8      Plan of Care Interventions:  [x] Therapeutic Exercise  [x] Modalities:  [x] Therapeutic Activity     [] Ultrasound  [] Estim  [x] Gait Training      [] Cervical Traction [] Lumbar Traction  [x] Neuromuscular Re-education    [x] Cold/hotpack [] Iontophoresis   [x] Instruction in HEP      [x] Vasopneumatic   [] Dry Needling    [x] Manual Therapy               [] Aquatic Therapy              Electronically signed by:  Giovanna Torres, PT,  PT, MPT, ATC  11/5/2021 6:39 AM    11/5/2021, 7:42 AM

## 2021-11-08 ENCOUNTER — HOSPITAL ENCOUNTER (OUTPATIENT)
Dept: PHYSICAL THERAPY | Age: 56
Setting detail: THERAPIES SERIES
Discharge: HOME | End: 2021-11-08
Payer: COMMERCIAL

## 2021-11-08 PROCEDURE — 97530 THERAPEUTIC ACTIVITIES: CPT

## 2021-11-08 PROCEDURE — 97110 THERAPEUTIC EXERCISES: CPT

## 2021-11-08 PROCEDURE — 97016 VASOPNEUMATIC DEVICE THERAPY: CPT

## 2021-11-08 NOTE — FLOWSHEET NOTE
limited ROM and strength in R LE. These limitations are affecting her ability to work and perform usual daily activity. She will benefit from PTto help restore ROM, strength, gait and PLOF. Prior to 2020 she was walking w/ min limp or pain. Subjective:   More stiffness than pain for the most part, some swelling still. Pt reports standing and walking are improving and is noting less limp, incision not as tight. Pt still has a hard time standing for long periods, does ok if keeps changing positions, but will need to get back to standing for work. Any changes in Ambulatory Summary Sheet? None        Objective:  COVID screening questions were asked and patient attested that there had been no contact or symptoms    Pt ambulates w/ slight limp but no device, more shortened step length than anything.     Fatigue noted w/ SLR, quad lag towards end, but can maintain SLR well at first.    Needs mod to mod cues to get S/L hip abd mechanics correct  AROM R knee:  Flexion 122°    Extension 0° after stretching           Exercises: (No more than 4 columns)   Exercise/Equipment 10/25/2021 #5 11/01/21 #6 11/5/21  #7 11/8/21  #8            WARM UP        Nustep  5' 5' lvl 5 --    Rec bike   -- Vision bike 13,  S11 5'            TABLE       QS  10*5\" in long sit focusing on superior translation of patella  10*5\" in long sit focusing on superior translation of patella  W/ SLR  Heel up 10x10\"    SLR  2*10  2*10  10x3 w/ QS 15x2    Hip abd S/L   10x3  10x3    Calf/ham stretch long sit --  Man  Man    Heel slide  --  Strap 10x  Strap 10x    Seated LAQ  Flex  -- 2x10  -- 10x5\"    SAQ 2*10  Bball 20x  --   Bridge   -- 10x2  10x2    Fitter knee ext   Flex   2 BC 2x10 2 blue 20x  1 blue 20x --   Ham curl     GTB 10x2              STANDING       Heel raise  2*10 2*10 FR 20x  FR 20x   march 2*10 steps alternating LE's   -- --   STS  10* 10* 22\" 10x2  22\" 10x2    TKE     GTB 10x10\"   HS stretch --  -- -   HS curl 2*10 2*10 with 2# weight -- -   Ant step up   Lat  Next  2x10 6\" step 6\" 10x2  6\" 10x2  4\" 10x2    Hip flex and abduction Abduction only 10* ea LE   -- --   Shuttle leg press B    3C 15x2                PROPRIOCEPTION       Rocker board balance 2*30\" ea dir   30\"x2 ea way 30\"x2 ea way                               MODALITIES       Vaso  10' 10' 10' 10'              Other Therapeutic Activities/Education:  10/11/2021: Patient received education on their current pathology and how their condition effects them with their functional activities. Patient understood discussion and questions were answered. Patient understands their activity limitations and understands rational for treatment progression. 10/18: get a cane for home use and start out using inside the home and when comfortable can begin outside the home too. Home Exercise Program:  Issued, practiced and pt demo ability to perform 10/11/2021, see PN 11/8/21  Access Code: [email or web address]  URL: [email or web address]  Date: 11/08/2021  Prepared by: Joce Spencer    Exercises  Seated Hamstring Curl with Anchored Resistance - 1 x daily - 3-4 x weekly - 2-3 sets - 10 reps  Standing Terminal Knee Extension with Resistance - 1-2 x daily - 7 x weekly - 1-2 sets - 10 reps - 5 seconds hold       Manual Treatments:  HS stretching      Modalities:  Vaso low pressure to R knee, pt recumb w/ leg on pillow, 10'      Communication with other providers:  POC set for cosign 10/11/21      Assessment:   Andre Dodd is making very good progress towards her goals. She has mild ROM deficits at this time w/ quad weakness, especially terminal quad and also decreased activity tolerance/functional strength and balance. She will continue to benefit from PT to help restore maximal functional ability and prepare for surgery on L LE. Pain after Rx  0/10.     Plan for Next Session:  progress ROM, strength, gait, balance to yanni, pain and edema control      Time In / Time Out:    1015/1110        Timed Code/Total Treatment Minutes:   45/55    2 TE 25', 1 TA 20'   1 Vaso 10'    Next Progress Note due: see PN 11/8/21      Plan of Care Interventions:  [x] Therapeutic Exercise  [x] Modalities:  [x] Therapeutic Activity     [] Ultrasound  [] Estim  [x] Gait Training      [] Cervical Traction [] Lumbar Traction  [x] Neuromuscular Re-education    [x] Cold/hotpack [] Iontophoresis   [x] Instruction in HEP      [x] Vasopneumatic   [] Dry Needling    [x] Manual Therapy               [] Aquatic Therapy              Electronically signed by:  Anu Zapata, PT,  PT, MPT, ATC  11/8/2021 9:05 AM       11/8/2021, 11:04 AM

## 2021-11-08 NOTE — PROGRESS NOTES
Outpatient Physical Therapy           [place]           [x] Phone: [phone]   Fax: [phone]  [facility]al[facility] martha           [] Phone: [phone]   Fax: [phone]      To: Shahnaz Gunn        From: Giovanna Torres, PT, PT     Patient: Magdalene Wolf                  : 1965  Diagnosis: R TKA                 Date: 2021  Treatment Diagnosis: R knee pain, stiffness, weakness, altered gait     [x]  Progress Note                []  Discharge Note    Evaluation Date:  10/11/2021 Total Visits to date:   8 Cancels/No-shows to date:  0    Subjective:  More stiffness than pain for the most part, some swelling still. Pt reports standing and walking are improving and is noting less limp, incision not as tight. Pt still has a hard time standing for long periods, does ok if keeps changing positions, but will need to get back to standing for work. Plan of Care/Treatment to date:  [x] Therapeutic Exercise    [x] Modalities:  [x] Therapeutic Activity     [] Ultrasound  [] Electrical Stimulation  [x] Gait Training      [] Cervical Traction   [] Lumbar Traction  [x] Neuromuscular Re-education  [x] Cold/hotpack [] Iontophoresis  [x] Instruction in HEP      Other:  [x] Manual Therapy       [x]  Vasopneumatic  [] Aquatic Therapy       []   Dry Needle Therapy                      Objective/Significant Findings At Last Visit/Comments:  COVID screening questions were asked and patient attested that there had been no contact or symptoms     Pt ambulates w/ slight limp but no device, more shortened step length than anything. Fatigue noted w/ SLR, quad lag towards end, but can maintain SLR well at first.    Needs mod to mod cues to get S/L hip abd mechanics correct  AROM R knee:  Flexion 122°    Extension 0° after stretching    Assessment:   Ivan Fountain is making very good progress towards her goals.  She has mild ROM deficits at this time w/ quad weakness, especially terminal quad and also decreased activity tolerance/functional strength and balance. She will continue to benefit from PT to help restore maximal functional ability and prepare for surgery on L LE. Pain after Rx  0/10. Goal Status:  [] Achieved [x] Partially Achieved  [] Not Achieved   Patient goals : walking and running w/o pain/limit, no device, get it bending again. Short term goals  Time Frame for Short term goals: 3 weeks  Short term goal 1: 3 weeks  Short term goal 2: Pt will be able to walk w/ SPC or less at least 50% of the time  Met  Short term goal 3: Pt will be able to achieve at least 100° flex for ease of gait and stairs   Met 10/25  Short term goal 4: Pt will have pain <5/10 at least 50% of the time  Met  Long term goals  Time Frame for Long term goals : 6 weeks  Long term goal 1: 6 weeks  Long term goal 2: Pt will be able to walk w/o device at least 75% of the time w/o R knee pain  Met  Long term goal 3: Pt will be able to go up/down stairs reciprocally w/o pain or limit  Progressing   Long term goal 4: Pt will have improved KOOS score by 20% or more  KOOS 61% at eval, 11/8/21:  7%      Frequency/Duration:  # Days per week: [] 1 day # Weeks: [] 1 week [] 4 weeks [] 8 weeks     [x] 2 days   [] 2 weeks [] 5 weeks [] 10 weeks     [] 3 days   [] 3 weeks [x] 6 weeks [] 12 weeks       Rehab Potential: [] Excellent [x] Good [] Fair  [] Poor         Patient Status: [x] Continue per initial plan of Care     [] Patient now discharged     [] Additional visits requested, Please re-certify for additional visits:      Requested frequency/duration:  /week for weeks    If we are requesting more visits, we fully anticipate the patient's condition is expected to improve within the treatment timeframe we are requesting. Electronically signed by:  Giovanna Torres, PT, PT, MPT, ATC  11/8/2021, 9:07 AM    11/8/2021, 11:06 AM  If you have any questions or concerns, please don't hesitate to call.   Thank you for your referral.    Physician Signature:______________________ Date:______ Time: ________  By signing above, therapists plan is approved by physician

## 2021-11-10 ENCOUNTER — HOSPITAL ENCOUNTER (OUTPATIENT)
Dept: WOMENS IMAGING | Age: 56
Discharge: HOME | End: 2021-11-10
Payer: COMMERCIAL

## 2021-11-10 DIAGNOSIS — M85.80 OSTEOPENIA, UNSPECIFIED LOCATION: ICD-10-CM

## 2021-11-10 PROCEDURE — 77080 DXA BONE DENSITY AXIAL: CPT

## 2021-11-11 ENCOUNTER — HOSPITAL ENCOUNTER (OUTPATIENT)
Dept: PHYSICAL THERAPY | Age: 56
Setting detail: THERAPIES SERIES
Discharge: HOME | End: 2021-11-11
Payer: COMMERCIAL

## 2021-11-11 PROCEDURE — 97110 THERAPEUTIC EXERCISES: CPT

## 2021-11-11 PROCEDURE — 97016 VASOPNEUMATIC DEVICE THERAPY: CPT

## 2021-11-11 PROCEDURE — 97530 THERAPEUTIC ACTIVITIES: CPT

## 2021-11-11 NOTE — FLOWSHEET NOTE
Outpatient Physical Therapy  Martín           [x] Phone: [phone]   Fax: [phone]  [facility]           [] Phone: [phone]   Fax: [phone]        Physical Therapy Daily Treatment Note  Date:  2021    Patient Name:  Rasheeda Mcleod    :  1965  MRN: [id number]  Restrictions/Precautions: Other position/activity restrictions: postop R TKA  Diagnosis:   Diagnosis: R TKA  Date of Injury/Surgery: 10/6/21  Treatment Diagnosis: Treatment Diagnosis: R knee pain, stiffness, weakness, altered gait    Insurance/Certification information: PT Insurance Information: [employer] 20 PCY --and considering other knee so watch visits please  Referring Physician:  Referring Practitioner: Daren Bob Doctor Visit:    Plan of care signed (Y/N):  yes  Outcome Measure: KOOS 61%  Visit# / total visits:    POC   Pain level: 0/10 R knee      Goals:     Patient goals : walking and running w/o pain/limit, no device, get it bending again. Short term goals  Time Frame for Short term goals: 3 weeks  Short term goal 1: 3 weeks  Short term goal 2: Pt will be able to walk w/ SPC or less at least 50% of the time  Met  Short term goal 3: Pt will be able to achieve at least 100° flex for ease of gait and stairs   Met 10/  Short term goal 4: Pt will have pain <5/10 at least 50% of the time  Met  Long term goals  Time Frame for Long term goals : 6 weeks  Long term goal 1: 6 weeks  Long term goal 2: Pt will be able to walk w/o device at least 75% of the time w/o R knee pain  Met  Long term goal 3: Pt will be able to go up/down stairs reciprocally w/o pain or limit  Progressing   Long term goal 4: Pt will have improved KOOS score by 20% or more  KOOS 61% at [facility], 21:  7%      Summary of Evaluation: Assessment: Pt is a 61 yo female who presents to PT 5 days postop R TKA. She has not Hx prior knee surgery but L knee is needing replaced as well.   She is ambulating w/ 2WW w/ good WB, has moderate edema, limited ROM and strength in R LE. These limitations are affecting her ability to work and perform usual daily activity. She will benefit from PTto help restore ROM, strength, gait and PLOF. Prior to 2020 she was walking w/ min limp or pain. Subjective:   Galina reports no change in symptoms from last session. Any changes in Ambulatory Summary Sheet? None        Objective:  COVID screening questions were asked and patient attested that there had been no contact or symptoms    PT provides tactile and verbal cueing for quad set.     AROM R knee:  Flexion 122°    Extension 0° after stretching         Exercises: (No more than 4 columns)   Exercise/Equipment 11/01/21 #6 11/5/21  #7 11/8/21  #8 11/11/21 #9            WARM UP        Nustep  5' lvl 5 --     Rec bike  -- Vision bike 13,  S11 5'   S11 5'           TABLE       QS  10*5\" in long sit focusing on superior translation of patella  W/ SLR  Heel up 10x10\"  Heel up 10x10\"    SLR  2*10  10x3 w/ QS 15x2  15x2    Hip abd S/L  10x3  10x3  x10  2x10 w/2lb ankle weight   Calf/ham stretch long sit  Man  Man  Man    Heel slide   Strap 10x  Strap 10x  Strap 10x    Seated LAQ  Flex  2x10  -- 10x5\"  2x10,  5\"   W/2lb ankle weight   SAQ  Bball 20x  --    Bridge  -- 10x2  10x2  10x2    Fitter knee ext   Flex  2 BC 2x10 2 blue 20x  1 blue 20x --    Ham curl    GTB 10x2  GTB 10x2              STANDING       Heel raise  2*10 FR 20x  FR 20x FR 20x   march  -- --    STS  10* 22\" 10x2  22\" 10x2  22\" 10x2    TKE    GTB 10x10\" GTB 10x10\"   HS stretch  -- -    HS curl 2*10 with 2# weight -- -    Ant step up   Lat  2x10 6\" step 6\" 10x2  6\" 10x2  4\" 10x2  6\" 10x2  4\" 10x2    Hip flex and abduction  -- --    Shuttle leg press B   3C 15x2  3C 15x2                PROPRIOCEPTION       Rocker board balance  30\"x2 ea way 30\"x2 ea way 30\"x2 ea way                               MODALITIES       Vaso  10' 10' 10' 10'              Other Therapeutic Activities/Education:  10/11/2021: Patient received education on their current pathology and how their condition effects them with their functional activities. Patient understood discussion and questions were answered. Patient understands their activity limitations and understands rational for treatment progression. 10/18: get a cane for home use and start out using inside the home and when comfortable can begin outside the home too. Home Exercise Program:  Issued, practiced and pt demo ability to perform 10/11/2021, see PN 11/8/21  Access Code: [email or web address]  URL: [email or web address]  Date: 11/08/2021  Prepared by: Arianna Harrison    Exercises  Seated Hamstring Curl with Anchored Resistance - 1 x daily - 3-4 x weekly - 2-3 sets - 10 reps  Standing Terminal Knee Extension with Resistance - 1-2 x daily - 7 x weekly - 1-2 sets - 10 reps - 5 seconds hold       Manual Treatments:  HS stretching      Modalities:  Vaso low pressure to R knee, pt recumb w/ leg on pillow, 10'      Communication with other providers:  POC set for cosign 10/11/21      Assessment:   Brittaney Graf presents with a good tolerance towards today's therapy session. LAQ and SL hip abduction progressed and 2lb ankle weight was added. Patient able to complete all activities. Pain after Rx  0/10.     Plan for Next Session:  progress ROM, strength, gait, balance to yanni, pain and edema control      Time In / Time Out:    4683-7185        Timed Code/Total Treatment Minutes:  45'/55'    There ex 25', There act 20', Vaso 10'      Next Progress Note due: see PN 11/8/21      Plan of Care Interventions:  [x] Therapeutic Exercise  [x] Modalities:  [x] Therapeutic Activity     [] Ultrasound  [] Estim  [x] Gait Training      [] Cervical Traction [] Lumbar Traction  [x] Neuromuscular Re-education    [x] Cold/hotpack [] Iontophoresis   [x] Instruction in HEP      [x] Vasopneumatic   [] Dry Needling    [x] Manual Therapy               [] Aquatic Therapy              Electronically signed by: La Savage, PT     11/11/2021 11:53 AM

## 2021-11-15 ENCOUNTER — HOSPITAL ENCOUNTER (OUTPATIENT)
Dept: PHYSICAL THERAPY | Age: 56
Setting detail: THERAPIES SERIES
Discharge: HOME | End: 2021-11-15
Payer: COMMERCIAL

## 2021-11-15 PROCEDURE — 97530 THERAPEUTIC ACTIVITIES: CPT

## 2021-11-15 PROCEDURE — 97016 VASOPNEUMATIC DEVICE THERAPY: CPT

## 2021-11-15 PROCEDURE — 97140 MANUAL THERAPY 1/> REGIONS: CPT

## 2021-11-15 PROCEDURE — 97110 THERAPEUTIC EXERCISES: CPT

## 2021-11-15 NOTE — FLOWSHEET NOTE
Outpatient Physical Therapy  Martín           [x] Phone: [phone]   Fax: [phone]  [place]           [] Phone: [phone]   Fax: [phone]        Physical Therapy Daily Treatment Note  Date:  11/15/2021    Patient Name:  Pastora Cancino    :  1965  MRN: [id number]  Restrictions/Precautions: Other position/activity restrictions: postop R TKA  Diagnosis:   Diagnosis: R TKA  Date of Injury/Surgery: 10/6/21  Treatment Diagnosis: Treatment Diagnosis: R knee pain, stiffness, weakness, altered gait    Insurance/Certification information: PT Insurance Information: Medical [employer] [place] 20 PCY --and considering other knee so watch visits please  Referring Physician:  Referring Practitioner: Eliane Bob Doctor Visit:    Plan of care signed (Y/N):  yes  Outcome Measure: KOOS 61%  Visit# / total visits:   10/12 POC   Pain level: 0/10 R knee      Goals:     Patient goals : walking and running w/o pain/limit, no device, get it bending again. Short term goals  Time Frame for Short term goals: 3 weeks  Short term goal 1: 3 weeks  Short term goal 2: Pt will be able to walk w/ SPC or less at least 50% of the time  Met  Short term goal 3: Pt will be able to achieve at least 100° flex for ease of gait and stairs   Met 10  Short term goal 4: Pt will have pain <5/10 at least 50% of the time  Met  Long term goals  Time Frame for Long term goals : 6 weeks  Long term goal 1: 6 weeks  Long term goal 2: Pt will be able to walk w/o device at least 75% of the time w/o R knee pain  Met  Long term goal 3: Pt will be able to go up/down stairs reciprocally w/o pain or limit  Progressing   Long term goal 4: Pt will have improved KOOS score by 20% or more  KOOS 61% at [facility], 21:  7%      Summary of Evaluation: Assessment: Pt is a 59 yo female who presents to PT 5 days postop R TKA. She has not Hx prior knee surgery but L knee is needing replaced as well.   She is ambulating w/ 2WW w/ good WB, has moderate edema, limited ROM and strength in R LE. These limitations are affecting her ability to work and perform usual daily activity. She will benefit from PTto help restore ROM, strength, gait and PLOF. Prior to 2020 she was walking w/ min limp or pain. Subjective:   Xavier Ryan reports that she trailed driving, slowly, and had no issues. She has had no pain at home and reports no pain today. Any changes in Ambulatory Summary Sheet? None        Objective:  COVID screening questions were asked and patient attested that there had been no contact or symptoms    Moderate swelling persists in Rt knee.  Hyper tonicity     AROM R knee:  Flexion 119° , 125 degrees after manual stretching   Extension lacing 5°, after stretching         Exercises: (No more than 4 columns)   Exercise/Equipment 11/5/21  #7 11/8/21  #8 11/11/21 #9 11/15/21 #10            WARM UP        Nustep  --      Rec bike  Vision bike 13,  S11 5'   S11 5'  S11 5'          TABLE       QS  W/ SLR  Heel up 10x10\"  Heel up 10x10\"  Heel up 10x10\"    SLR  10x3 w/ QS 15x2  15x2  3x10   Hip abd S/L 10x3  10x3  x10  2x10 w/2lb ankle weight 2x10 w/2lb ankle weight   Calf/ham stretch long sit Man  Man  Man  manual   Heel slide  Strap 10x  Strap 10x  Strap 10x     Seated LAQ  Flex  -- 10x5\"  2x10,  5\"   W/2lb ankle weight 2x10 w 2lb ankle weight   SAQ Bball 20x  --  On medium wedge 10x2   Bridge  10x2  10x2  10x2  10x2    Fitter knee ext   Flex  2 blue 20x  1 blue 20x --     Ham curl   GTB 10x2  GTB 10x2  GTB 10x2             STANDING       Heel raise  FR 20x  FR 20x FR 20x    march -- --     STS  22\" 10x2  22\" 10x2  22\" 10x2     TKE   GTB 10x10\" GTB 10x10\"    HS stretch -- -     HS curl -- -     Ant step up   Lat  6\" 10x2  6\" 10x2  4\" 10x2  6\" 10x2  4\" 10x2     Hip flex and abduction -- --     Shuttle leg press B  3C 15x2  3C 15x2  3C 15x2 , 2C heel raise 10x2               PROPRIOCEPTION       Rocker board balance 30\"x2 ea way 30\"x2 ea way 30\"x2 ea way 30\"x2 ea way 15x2 taps ea direction   Ant step up on airex    2x10                        MODALITIES       Vaso  10' 10' 10' 10'              Other Therapeutic Activities/Education:  10/11/2021: Patient received education on their current pathology and how their condition effects them with their functional activities. Patient understood discussion and questions were answered. Patient understands their activity limitations and understands rational for treatment progression. 10/18: get a cane for home use and start out using inside the home and when comfortable can begin outside the home too. Home Exercise Program:  Issued, practiced and pt demo ability to perform 10/11/2021, see PN 11/8/21  Access Code: [email or web address]  URL: [email or web address]  Date: 11/08/2021  Prepared by: Chloe Duane    Exercises  Seated Hamstring Curl with Anchored Resistance - 1 x daily - 3-4 x weekly - 2-3 sets - 10 reps  Standing Terminal Knee Extension with Resistance - 1-2 x daily - 7 x weekly - 1-2 sets - 10 reps - 5 seconds hold       Manual Treatments:  HS and supine knee flexion stretching, TPR/STM to superior patellar region      Modalities:  Vaso low pressure to R knee, pt recumb w/ leg on pillow, 10'      Communication with other providers:  POC set for cosign 10/11/21      Assessment:   Frankie Loera presents with a good tolerance towards today's therapy session. Knee extension activites presents with slight discomfort in the suprapatellar region, short manual patella mobs and STM/TPR which helps decrease discomfort. Patient able to complete all activities. Instructed she trial minimal work at her place of employment. No pain at EOS. Pain after Rx  0/10.     Plan for Next Session:  progress ROM, strength, gait, balance to yanni, pain and edema control      Time In / Time Out:    0585-2494       Timed Code/Total Treatment Minutes:  45'/55'   1 MT 10',  1 TE 20', 1 TA 15', 1 Vaso 10'      Next Progress Note due: see PN 11/8/21      Plan of Care Interventions:  [x] Therapeutic Exercise  [x] Modalities:  [x] Therapeutic Activity     [] Ultrasound  [] Estim  [x] Gait Training      [] Cervical Traction [] Lumbar Traction  [x] Neuromuscular Re-education    [x] Cold/hotpack [] Iontophoresis   [x] Instruction in HEP      [x] Vasopneumatic   [] Dry Needling    [x] Manual Therapy               [] Aquatic Therapy              Electronically signed by:   Sherren Basset, [place]  11/15/2021 9:47 AM

## 2021-11-19 ENCOUNTER — HOSPITAL ENCOUNTER (OUTPATIENT)
Dept: PHYSICAL THERAPY | Age: 56
Setting detail: THERAPIES SERIES
Discharge: HOME | End: 2021-11-19
Payer: COMMERCIAL

## 2021-11-19 PROCEDURE — 97110 THERAPEUTIC EXERCISES: CPT

## 2021-11-19 PROCEDURE — 97016 VASOPNEUMATIC DEVICE THERAPY: CPT

## 2021-11-19 NOTE — FLOWSHEET NOTE
Outpatient Physical Therapy  Martín           [x] Phone: [phone]   Fax: [phone]  [place]           [] Phone: [phone]   Fax: [phone]        Physical Therapy Daily Treatment Note  Date:  2021    Patient Name:  Alex Mcnamara    :  1965  MRN: [id number]  Restrictions/Precautions: Other position/activity restrictions: postop R TKA  Diagnosis:   Diagnosis: R TKA  Date of Injury/Surgery: 10/6/21  Treatment Diagnosis: Treatment Diagnosis: R knee pain, stiffness, weakness, altered gait    Insurance/Certification information: PT Insurance Information: [employer] [place] 20 PCY --and considering other knee so watch visits please  Referring Physician:  Referring Practitioner: Ana Bob Doctor Visit:    Plan of care signed (Y/N):  yes  Outcome Measure: KOOS 61%  Visit# / total visits:    POC   Pain level: 0/10 R knee      Goals:     Patient goals : walking and running w/o pain/limit, no device, get it bending again. Short term goals  Time Frame for Short term goals: 3 weeks  Short term goal 1: 3 weeks  Short term goal 2: Pt will be able to walk w/ SPC or less at least 50% of the time  Met  Short term goal 3: Pt will be able to achieve at least 100° flex for ease of gait and stairs   Met 10/25  Short term goal 4: Pt will have pain <5/10 at least 50% of the time  Met  Long term goals  Time Frame for Long term goals : 6 weeks  Long term goal 1: 6 weeks  Long term goal 2: Pt will be able to walk w/o device at least 75% of the time w/o R knee pain  Met  Long term goal 3: Pt will be able to go up/down stairs reciprocally w/o pain or limit  Progressing   Long term goal 4: Pt will have improved KOOS score by 20% or more  KOOS 61% at [facility], 21:  7%      Summary of Evaluation: Assessment: Pt is a 61 yo female who presents to PT 5 days postop R TKA. She has not Hx prior knee surgery but L knee is needing replaced as well.   She is ambulating w/ 2WW w/ good WB, has moderate edema, limited ROM and strength in R LE. These limitations are affecting her ability to work and perform usual daily activity. She will benefit from PTto help restore ROM, strength, gait and PLOF. Prior to 2020 she was walking w/ min limp or pain. Subjective:   Wiliam Simons arrives to therapy stating that there is currently no pain in the R knee, feels currently more limited by the L LE. She denies any difficulty with functional activities at home besides some increased swelling with prolonged standing. Any changes in Ambulatory Summary Sheet? None        Objective:  COVID screening questions were asked and patient attested that there had been no contact or symptoms    AROM  0-123°     MMT  Flex 4+/5  Ext 5/5    SLR 10 x with no lag    STS 5 x in 15\"     Stairs up/down reciprocal pattern with 1 hand rail. Descent was limited by eccentric weakness and pain/tightness in the non-operative knee.  Ascent slight weakness noted when going up leading with the R LE.    SLS 30\" B LE's, slight decrease in stability on the R vs. The L but no LOB noted        Exercises: (No more than 4 columns)   Exercise/Equipment 11/11/21 #9 11/15/21 #10 11/19/2021 #11           WARM UP       Nustep       Rec bike  S11 5'  S11 5' S11 5'         TABLE      QS  Heel up 10x10\"  Heel up 10x10\"  --   SLR  15x2  3x10 --   Hip abd S/L x10  2x10 w/2lb ankle weight 2x10 w/2lb ankle weight --   Seated LAQ  Flex  2x10,  5\"   W/2lb ankle weight 2x10 w 2lb ankle weight 2# 3*10   SAQ  On medium wedge 10x2 --   Bridge  10x2  10x2  --            STANDING      Ant step up   Lat  6\" 10x2  4\" 10x2   Up and over forward 6\" 10*  Lateral 8\" 2*10   Shuttle leg press B 3C 15x2  3C 15x2 , 2C heel raise 10x2 2C 2*10 R LE only    Retro walk    13# 5*               PROPRIOCEPTION      Rocker board balance 30\"x2 ea way 30\"x2 ea way 15x2 taps ea direction 1' ea dir    Ant step up on airex  2x10 --                     MODALITIES      Vaso  10' 10' 10'             Other Therapeutic Activities/Education:  10/11/2021: Patient received education on their current pathology and how their condition effects them with their functional activities. Patient understood discussion and questions were answered. Patient understands their activity limitations and understands rational for treatment progression. 10/18: get a cane for home use and start out using inside the home and when comfortable can begin outside the home too. Home Exercise Program:  Issued, practiced and pt demo ability to perform 10/11/2021, see PN 11/8/21  Access Code: [email or web address]  URL: [email or web address]  Date: 11/08/2021  Prepared by: Cesilia Freed    Exercises  Seated Hamstring Curl with Anchored Resistance - 1 x daily - 3-4 x weekly - 2-3 sets - 10 reps  Standing Terminal Knee Extension with Resistance - 1-2 x daily - 7 x weekly - 1-2 sets - 10 reps - 5 seconds hold       Manual Treatments: prone quad stretch 3*30\"       Modalities:  Vaso low pressure to R knee, pt recumb w/ leg on pillow, 10'      Communication with other providers:  POC set for cosign 10/11/21      Assessment:   Bubba Pate has been seen in PT for 11 sessions following a TKA. Treatments have been focused on ROM, and quad strengthening. She is doing very well with ROM WFL and quad strength returning. Her balance is good and equal bilaterally. She does still demo some slight quad weakness which is normal for this stage of recovery. She will likely do well continuing to work on her HEP on her own until her next knee surgery. Advised her to keep her appointment with her doc and primary therapist next week to determine if more therapy is warranted at this time.  End session pain: 0/10      Plan for Next Session:  progress ROM, strength, gait, balance to yanni, pain and edema control      Time In / Time Out:    0800/0853       Timed Code/Total Treatment Minutes:  43'/53' 1 vaso 10' 3 TE 37'      Next Progress Note due: see PN 11/8/21      Plan of Care Interventions:  [x] Therapeutic Exercise  [x] Modalities:  [x] Therapeutic Activity     [] Ultrasound  [] Estim  [x] Gait Training      [] Cervical Traction [] Lumbar Traction  [x] Neuromuscular Re-education    [x] Cold/hotpack [] Iontophoresis   [x] Instruction in HEP      [x] Vasopneumatic   [] Dry Needling    [x] Manual Therapy               [] Aquatic Therapy              Electronically signed by:  Eliane Holloway PTA     11/19/2021 6:55 AM

## 2021-11-23 ENCOUNTER — OFFICE VISIT (OUTPATIENT)
Dept: ORTHOPEDIC SURGERY | Age: 56
End: 2021-11-23

## 2021-11-23 VITALS
WEIGHT: 154 LBS | BODY MASS INDEX: 29.07 KG/M2 | OXYGEN SATURATION: 100 % | HEIGHT: 61 IN | RESPIRATION RATE: 15 BRPM | HEART RATE: 82 BPM

## 2021-11-23 DIAGNOSIS — M17.12 PRIMARY OSTEOARTHRITIS OF LEFT KNEE: Primary | ICD-10-CM

## 2021-11-23 DIAGNOSIS — Z96.651 S/P TOTAL KNEE REPLACEMENT, RIGHT: ICD-10-CM

## 2021-11-23 PROCEDURE — 99024 POSTOP FOLLOW-UP VISIT: CPT | Performed by: ORTHOPAEDIC SURGERY

## 2021-11-23 RX ORDER — CELECOXIB 200 MG/1
200 CAPSULE ORAL 2 TIMES DAILY
Qty: 60 CAPSULE | Refills: 1 | Status: SHIPPED | OUTPATIENT
Start: 2021-11-23 | End: 2022-04-25

## 2021-11-23 ASSESSMENT — ENCOUNTER SYMPTOMS
SHORTNESS OF BREATH: 0
COLOR CHANGE: 0
CHEST TIGHTNESS: 0

## 2021-11-23 NOTE — PATIENT INSTRUCTIONS
Continue weight-bearing as tolerated. Continue range of motion exercises as instructed. Ice and elevate as needed. Tylenol or Motrin for pain.   Follow up after surgery

## 2021-11-23 NOTE — PROGRESS NOTES
11/23/2021   Chief Complaint   Patient presents with    Post-Op Check     right TKA        History of Present Illness:                             Johann Soriano is a 60 y.o. female who returns today for follow-up of her right total knee replacement. She is doing very well with her physical therapy. She feels that her knee is functioning very well for all of her activities and she is very pleased with her range of motion and strength. Her main problem is ongoing left knee pain and she has plans to proceed with left total knee replacement next month. Patient returns to the office today for FU of the right TKA DOS 10/6/21. Pt states pain today is a 0/10 pt states she has one more session with therapy. Pt states at this time she has no complaints of the right knee. Pt states the left knee is a constant achy pain globally in the knee. Pt states she is taking her  Celebrex to help with her left knee pain. Pt states going up and down the stairs will cause a sharp stabbing pain in the left knee. Medical History  Patient's medications, allergies, past medical, surgical, social and family histories were reviewed and updated as appropriate. Review of Systems   Constitutional: Negative for activity change and fever. Respiratory: Negative for chest tightness and shortness of breath. Cardiovascular: Negative for chest pain. Skin: Negative for color change. Neurological: Negative for dizziness. Psychiatric/Behavioral: The patient is not nervous/anxious. Examination:  General Exam:  Vitals: Pulse 82   Resp 15   Ht 5' 1\" (1.549 m)   Wt 154 lb (69.9 kg)   SpO2 100%   BMI 29.10 kg/m²    Physical Exam     Right Lower Extremity:    The incision is well-healed. No erythema, drainage, or induration. Minimal resolving soft tissue swelling present throughout the soft tissues of the knee.   Range of motion is present from full extension to 125 degrees of flexion. Calf is soft and nontender. Negative Homans sign. Sensation and motor function is intact at the knee and ankle. Left Lower Extremity:  There is moderate tenderness to palpation throughout the knee most significant along the medial joint line. There is a mild effusion present and mild global swelling at the knee anteriorly. Mild restricted range of motion at the knee with approximately 3 degrees short of full extension and knee flexion up to 130 degrees with pain at the extremes of motion. There is mild crepitation at the knee during active range of motion. There is normal knee alignment. There is 5 out of 5 strength with knee flexion and extension. There is no instability to varus or valgus stress testing or anterior and posterior drawer testing. Sensation is intact to light touch throughout the lower extremity. There is a moderately positive James's test with tenderness to palpation and pain along the medial joint line. Skin is intact. Pulses intact    No pain with active range of motion of the hip. Strength and range of motion of the hip are intact. No tenderness to palpation at the hip. Office Procedures:  No orders of the defined types were placed in this encounter. Assessment and Plan  1. Left knee primary osteoarthritis    2. Status post right total knee replacement    She has responded well to the physical therapy and is functioning at a high level following right total knee replacement. She continues to have aching pain at the left knee as previously discussed at the last note. Plan will be to proceed with a total left knee replacement next month. In the meantime she has requested a prescription for Celebrex and this was sent to her pharmacy.     All of her questions were answered and she will proceed with left total knee replacement        Electronically signed by Pricila Muniz MD on 11/23/2021 at 8:43 AM

## 2021-11-23 NOTE — PROGRESS NOTES
Patient returns to the office today for FU of the right TKA DOS 10/6/21. Pt states pain today is a 0/10 pt states she has one more session with therapy. Pt states at this time she has no complaints of the right knee. Pt states the left knee is a constant achy pain globally in the knee. Pt states she is taking her  Celebrex to help with her left knee pain. Pt states going up and down the stairs will cause a sharp stabbing pain in the left knee.

## 2021-11-26 ENCOUNTER — HOSPITAL ENCOUNTER (OUTPATIENT)
Dept: PHYSICAL THERAPY | Age: 56
Setting detail: THERAPIES SERIES
Discharge: HOME | End: 2021-11-26
Payer: COMMERCIAL

## 2021-11-26 PROCEDURE — 97110 THERAPEUTIC EXERCISES: CPT

## 2021-11-26 NOTE — FLOWSHEET NOTE
Outpatient Physical Therapy  [place]           [x] Phone: [phone]   Fax: [phone]  [place]           [] Phone: [phone]   Fax: [phone]        Physical Therapy Daily Treatment Note  Date:  2021    Patient Name:  Bassam Perkins    :  1965  MRN: [id number]  Restrictions/Precautions: Other position/activity restrictions: postop R TKA  Diagnosis:   Diagnosis: R TKA  Date of Injury/Surgery: 10/6/21  Treatment Diagnosis: Treatment Diagnosis: R knee pain, stiffness, weakness, altered gait    Insurance/Certification information: PT Insurance Information: [employer] [place] 20 PCY --and considering other knee so watch visits please  Referring Physician:  Referring Practitioner: Cornelia Conley  Next Doctor Visit:    Plan of care signed (Y/N):  yes  Outcome Measure: KOOS 61%  Visit# / total visits:    POC   Pain level: 0/10 R knee      Goals:     Patient goals : walking and running w/o pain/limit, no device, get it bending again. Short term goals  Time Frame for Short term goals: 3 weeks  Short term goal 1: 3 weeks  Short term goal 2: Pt will be able to walk w/ SPC or less at least 50% of the time  Met  Short term goal 3: Pt will be able to achieve at least 100° flex for ease of gait and stairs   Met    Short term goal 4: Pt will have pain <5/10 at least 50% of the time  Met  Long term goals  Time Frame for Long term goals : 6 weeks  Long term goal 1: 6 weeks  Long term goal 2: Pt will be able to walk w/o device at least 75% of the time w/o R knee pain  Met  Long term goal 3: Pt will be able to go up/down stairs reciprocally w/o pain or limit  Mostly met, limited by L knee  Long term goal 4: Pt will have improved KOOS score by 20% or more  Met   KOOS 61% at [facility], 21: 7%      Summary of Evaluation: Assessment: Pt is a 61 yo female who presents to PT 5 days postop R TKA. She has not Hx prior knee surgery but L knee is needing replaced as well.   She is ambulating w/ 2WW w/ good WB, has moderate edema, limited ROM and strength in R LE. These limitations are affecting her ability to work and perform usual daily activity. She will benefit from PTto help restore ROM, strength, gait and PLOF. Prior to 2020 she was walking w/ min limp or pain. Subjective:   Tommi Meckel arrives to therapy stating that there is currently no pain in the R knee, feels currently more limited by the L LE. She denies any difficulty with functional activities at home besides some increased swelling with prolonged standing. Any changes in Ambulatory Summary Sheet? None        Objective:  COVID screening questions were asked and patient attested that there had been no contact or symptoms    AROM  0-125°     MMT  Flex 5-/5  Ext 5/5    SLR 10 x with no lag    STS 5 x in 15\"     Stairs up/down reciprocal pattern with 1 hand rail. Descent was limited by eccentric weakness and pain/tightness in the non-operative knee.  Ascent slight weakness noted when going up leading with the R LE.    SLS 30\" B LE's, slight decrease in stability on the R vs. The L but no LOB noted        Exercises: (No more than 4 columns)   Exercise/Equipment 11/11/21 #9 11/15/21 #10 11/19/2021 #11 11/26/21  #12            WARM UP        Nustep        Rec bike  S11 5'  S11 5' S11 5' S11 5'          TABLE       QS  Heel up 10x10\"  Heel up 10x10\"  -- --   SLR  15x2  3x10 -- 2# 15x2 ea LE   Hip abd S/L x10  2x10 w/2lb ankle weight 2x10 w/2lb ankle weight -- 2#10x2 ea LE   Seated LAQ  Flex  2x10,  5\"   W/2lb ankle weight 2x10 w 2lb ankle weight 2# 3*10 --   SAQ  On medium wedge 10x2 -- --   Bridge  10x2  10x2  --              STANDING       Ant step up   Lat  6\" 10x2  4\" 10x2   Up and over forward 6\" 10*  Lateral 8\" 2*10 8\" up/over down R LE 15x   Shuttle leg press B 3C 15x2  3C 15x2 , 2C heel raise 10x2 2C 2*10 R LE only  2C 2x10 R LE only    Retro walk    13# 5*  13# 5x   STS     15x   Ham curl     B 30# 10x2             PROPRIOCEPTION       Rocker board balance 30\"x2 ea way 30\"x2 ea way 15x2 taps ea direction 1' ea dir  -   Ant step up on airex  2x10 -- -   SLS     R LE 30\" x2                 MODALITIES       Vaso  10' 10' 10' --              Other Therapeutic Activities/Education:  10/11/2021: Patient received education on their current pathology and how their condition effects them with their functional activities. Patient understood discussion and questions were answered. Patient understands their activity limitations and understands rational for treatment progression. 10/18: get a cane for home use and start out using inside the home and when comfortable can begin outside the home too. Home Exercise Program:  Issued, practiced and pt demo ability to perform 10/11/2021, see PN 11/8/21  Access Code: [email or web address]  URL: [email or web address]  Date: 11/08/2021  Prepared by: Brigette Sensing    Exercises  Seated Hamstring Curl with Anchored Resistance - 1 x daily - 3-4 x weekly - 2-3 sets - 10 reps  Standing Terminal Knee Extension with Resistance - 1-2 x daily - 7 x weekly - 1-2 sets - 10 reps - 5 seconds hold       Manual Treatments: prone quad stretch 3*30\"       Modalities:  Vaso low pressure to R knee, pt recumb w/ leg on pillow, 10'      Communication with other providers:  [address] set for cosign 10/11/21, see PN 11/8/21. 11/26/21      Assessment:   Tommi Meckel has been seen in PT for 12 sessions following R TKA. Treatments have been focused on ROM, and quad strengthening. She is doing very well with ROM WFL and quad strength returning. Her balance is good and equal bilaterally. Her R knee is doing very well and she is currently more limited by her L knee which is scheduled to be replaced on 12/202/21. We will see her to evaluate her L knee after that procedure and hold PT until then.  End session pain: 0/10      Plan for Next Session:  Eval L knee      Time In / Time Out:    0745/0820   Timed Code/Total Treatment Minutes:    35/35   2 TE 37'        Next Progress Note due: see PN 11/8/21. 11/26/21      Plan of Care Interventions:  [x] Therapeutic Exercise  [x] Modalities:  [x] Therapeutic Activity     [] Ultrasound  [] Estim  [x] Gait Training      [] Cervical Traction [] Lumbar Traction  [x] Neuromuscular Re-education    [x] Cold/hotpack [] Iontophoresis   [x] Instruction in HEP      [x] Vasopneumatic   [] Dry Needling    [x] Manual Therapy               [] Aquatic Therapy              Electronically signed by:  Brandon Saenz, PT PT, MPT, ATC     11/26/2021, 8:22 AM

## 2021-11-26 NOTE — DISCHARGE SUMMARY
Outpatient Physical Therapy           [place]           [x] Phone: [phone]   Fax: [phone]  [place]           [] Phone: [phone]   Fax: [phone]      To: Wing Gomez        From: Joseph Huffman, PT, PT     Patient: Lacie Montes                  : 1965  Diagnosis: R TKA                 Date: 2021  Treatment Diagnosis: R knee pain, stiffness, weakness, altered gait     []  Progress Note                [x]  Discharge Note    Evaluation Date:  10/11/2021 Total Visits to date:   12 Cancels/No-shows to date:  0    Subjective:  Jessica Shankar arrives to therapy stating that there is currently no pain in the R knee, feels currently more limited by the L LE. She denies any difficulty with functional activities at home besides some increased swelling with prolonged standing. Plan of Care/Treatment to date:  [x] Therapeutic Exercise    [x] Modalities:  [x] Therapeutic Activity     [] Ultrasound  [] Electrical Stimulation  [x] Gait Training      [] Cervical Traction   [] Lumbar Traction  [x] Neuromuscular Re-education  [x] Cold/hotpack [] Iontophoresis  [x] Instruction in HEP      Other:  [x] Manual Therapy       [x]  Vasopneumatic  [] Aquatic Therapy       []   Dry Needle Therapy                      Objective/Significant Findings At Last Visit/Comments:  COVID screening questions were asked and patient attested that there had been no contact or symptoms   AROM  0-125°      MMT  Flex 5-/5  Ext 5/5     SLR 10 x with no lag     STS 5 x in 15\"      Stairs up/down reciprocal pattern with 1 hand rail. Descent was limited by eccentric weakness and pain/tightness in the non-operative knee. Ascent slight weakness noted when going up leading with the R LE.     SLS 30\" B LE's, slight decrease in stability on the R vs. The L but no LOB noted     Assessment:   Jessica Shankar has been seen in PT for 12 sessions following R TKA. Treatments have been focused on ROM, and quad strengthening.  She is doing very well with ROM WFL and quad strength returning. Her balance is good and equal bilaterally. Her R knee is doing very well and she is currently more limited by her L knee which is scheduled to be replaced on 12/202/21. We will see her to evaluate her L knee after that procedure and hold PT until then. End session pain: 0/10 R knee    Goal Status:  [x] Achieved [] Partially Achieved  [] Not Achieved   Patient goals : walking and running w/o pain/limit, no device, get it bending again. Met for R, L knee limits running  Short term goals  Time Frame for Short term goals: 3 weeks  Short term goal 1: 3 weeks  Short term goal 2: Pt will be able to walk w/ SPC or less at least 50% of the time  Met  Short term goal 3: Pt will be able to achieve at least 100° flex for ease of gait and stairs   Met    Short term goal 4: Pt will have pain <5/10 at least 50% of the time  Met  Long term goals  Time Frame for Long term goals : 6 weeks  Long term goal 1: 6 weeks  Long term goal 2: Pt will be able to walk w/o device at least 75% of the time w/o R knee pain  Met  Long term goal 3: Pt will be able to go up/down stairs reciprocally w/o pain or limit  Mostly met, limited by L knee  Long term goal 4: Pt will have improved KOOS score by 20% or more  Met   KOOS 61% at [facility], 11/8/21: 7%    Frequency/Duration:  # Days per week: [] 1 day # Weeks: [] 1 week [] 4 weeks [] 8 weeks     [x] 2 days   [] 2 weeks [] 5 weeks [] 10 weeks     [] 3 days   [] 3 weeks [x] 6 weeks [] 12 weeks       Rehab Potential: [] Excellent [x] Good [] Fair  [] Poor         Patient Status: [] Continue per initial plan of Care     [x] Patient now discharged     [] Additional visits requested, Please re-certify for additional visits:      Requested frequency/duration:  /week for weeks    If we are requesting more visits, we fully anticipate the patient's condition is expected to improve within the treatment timeframe we are requesting.     Electronically signed by:  Glo Chavira

## 2021-12-06 RX ORDER — SODIUM CHLORIDE, SODIUM LACTATE, POTASSIUM CHLORIDE, CALCIUM CHLORIDE 600; 310; 30; 20 MG/100ML; MG/100ML; MG/100ML; MG/100ML
INJECTION, SOLUTION INTRAVENOUS CONTINUOUS
Status: CANCELLED | OUTPATIENT
Start: 2021-12-20

## 2021-12-07 ENCOUNTER — HOSPITAL ENCOUNTER (OUTPATIENT)
Dept: PREADMISSION TESTING | Age: 56
Discharge: HOME | End: 2021-12-11
Payer: COMMERCIAL

## 2021-12-07 VITALS
OXYGEN SATURATION: 97 % | TEMPERATURE: 97.6 F | RESPIRATION RATE: 19 BRPM | HEART RATE: 91 BPM | SYSTOLIC BLOOD PRESSURE: 143 MMHG | BODY MASS INDEX: 29.68 KG/M2 | DIASTOLIC BLOOD PRESSURE: 74 MMHG | WEIGHT: 157.2 LBS | HEIGHT: 61 IN

## 2021-12-07 DIAGNOSIS — Z01.818 PRE-OP TESTING: Primary | ICD-10-CM

## 2021-12-07 LAB
ALBUMIN SERPL-MCNC: 4.7 GM/DL (ref 3.4–5)
ALP BLD-CCNC: 97 IU/L (ref 40–129)
ALT SERPL-CCNC: 20 U/L (ref 10–40)
ANION GAP SERPL CALCULATED.3IONS-SCNC: 9 MMOL/L (ref 4–16)
AST SERPL-CCNC: 18 IU/L (ref 15–37)
BACTERIA: ABNORMAL /HPF
BASOPHILS ABSOLUTE: 0.1 K/CU MM
BASOPHILS RELATIVE PERCENT: 1.3 % (ref 0–1)
BILIRUB SERPL-MCNC: 0.3 MG/DL (ref 0–1)
BILIRUBIN URINE: NEGATIVE MG/DL
BLOOD, URINE: NEGATIVE
BUN BLDV-MCNC: 11 MG/DL (ref 6–23)
CALCIUM SERPL-MCNC: 9.8 MG/DL (ref 8.3–10.6)
CHLORIDE BLD-SCNC: 103 MMOL/L (ref 99–110)
CLARITY: CLEAR
CO2: 29 MMOL/L (ref 21–32)
COLOR: YELLOW
CREAT SERPL-MCNC: 0.7 MG/DL (ref 0.6–1.1)
DIFFERENTIAL TYPE: ABNORMAL
EOSINOPHILS ABSOLUTE: 0.2 K/CU MM
EOSINOPHILS RELATIVE PERCENT: 2.9 % (ref 0–3)
ERYTHROCYTE SEDIMENTATION RATE: 17 MM/HR (ref 0–30)
GFR AFRICAN AMERICAN: >60 ML/MIN/1.73M2
GFR NON-AFRICAN AMERICAN: >60 ML/MIN/1.73M2
GLUCOSE BLD-MCNC: 97 MG/DL (ref 70–99)
GLUCOSE, URINE: NEGATIVE MG/DL
HCT VFR BLD CALC: 38 % (ref 37–47)
HEMOGLOBIN: 12.4 GM/DL (ref 12.5–16)
IMMATURE NEUTROPHIL %: 0.4 % (ref 0–0.43)
KETONES, URINE: NEGATIVE MG/DL
LEUKOCYTE ESTERASE, URINE: ABNORMAL
LYMPHOCYTES ABSOLUTE: 1.3 K/CU MM
LYMPHOCYTES RELATIVE PERCENT: 24.9 % (ref 24–44)
MCH RBC QN AUTO: 25.7 PG (ref 27–31)
MCHC RBC AUTO-ENTMCNC: 32.6 % (ref 32–36)
MCV RBC AUTO: 78.7 FL (ref 78–100)
MONOCYTES ABSOLUTE: 0.5 K/CU MM
MONOCYTES RELATIVE PERCENT: 9.9 % (ref 0–4)
NITRITE URINE, QUANTITATIVE: NEGATIVE
NUCLEATED RBC %: 0 %
PDW BLD-RTO: 13.3 % (ref 11.7–14.9)
PH, URINE: 6 (ref 5–8)
PLATELET # BLD: 318 K/CU MM (ref 140–440)
PMV BLD AUTO: 10 FL (ref 7.5–11.1)
POTASSIUM SERPL-SCNC: 4.2 MMOL/L (ref 3.5–5.1)
PROTEIN UA: NEGATIVE MG/DL
RBC # BLD: 4.83 M/CU MM (ref 4.2–5.4)
RBC URINE: 1 /HPF (ref 0–6)
SEGMENTED NEUTROPHILS ABSOLUTE COUNT: 3.2 K/CU MM
SEGMENTED NEUTROPHILS RELATIVE PERCENT: 60.6 % (ref 36–66)
SODIUM BLD-SCNC: 141 MMOL/L (ref 135–145)
SPECIFIC GRAVITY UA: 1.01 (ref 1–1.03)
SQUAMOUS EPITHELIAL: 3 /HPF
TOTAL IMMATURE NEUTOROPHIL: 0.02 K/CU MM
TOTAL NUCLEATED RBC: 0 K/CU MM
TOTAL PROTEIN: 6.9 GM/DL (ref 6.4–8.2)
TRANSITIONAL EPITHELIAL: <1 /HPF
TRICHOMONAS: ABNORMAL /HPF
UROBILINOGEN, URINE: NEGATIVE MG/DL (ref 0.2–1)
WBC # BLD: 5.3 K/CU MM (ref 4–10.5)
WBC UA: 1 /HPF (ref 0–5)

## 2021-12-07 PROCEDURE — 87086 URINE CULTURE/COLONY COUNT: CPT

## 2021-12-07 PROCEDURE — 85652 RBC SED RATE AUTOMATED: CPT

## 2021-12-07 PROCEDURE — 81001 URINALYSIS AUTO W/SCOPE: CPT

## 2021-12-07 PROCEDURE — 85025 COMPLETE CBC W/AUTO DIFF WBC: CPT

## 2021-12-07 PROCEDURE — 36415 COLL VENOUS BLD VENIPUNCTURE: CPT

## 2021-12-07 PROCEDURE — 80053 COMPREHEN METABOLIC PANEL: CPT

## 2021-12-07 NOTE — PROGRESS NOTES
office    (H & P, Consent, & card for implantable devices). 12. Take a shower the night before or morning of your procedure, do not apply any lotion, oil or powder. 13. Wear a mask covering your nose & mouth when entering the hospital. Have your covid-19 test performed within 2-7 days of your                  Surgery-scheduled 12/13/21. Quarantine yourself after the test until after your surgery.

## 2021-12-08 LAB
CULTURE: NORMAL
Lab: NORMAL
SPECIMEN: NORMAL

## 2021-12-13 ENCOUNTER — HOSPITAL ENCOUNTER (OUTPATIENT)
Age: 56
Setting detail: SPECIMEN
Discharge: HOME | End: 2021-12-13
Payer: COMMERCIAL

## 2021-12-13 LAB
SARS-COV-2: NOT DETECTED
SOURCE: NORMAL

## 2021-12-13 PROCEDURE — U0005 INFEC AGEN DETEC AMPLI PROBE: HCPCS

## 2021-12-13 PROCEDURE — U0003 INFECTIOUS AGENT DETECTION BY NUCLEIC ACID (DNA OR RNA); SEVERE ACUTE RESPIRATORY SYNDROME CORONAVIRUS 2 (SARS-COV-2) (CORONAVIRUS DISEASE [COVID-19]), AMPLIFIED PROBE TECHNIQUE, MAKING USE OF HIGH THROUGHPUT TECHNOLOGIES AS DESCRIBED BY CMS-2020-01-R: HCPCS

## 2021-12-16 ENCOUNTER — ANESTHESIA EVENT (OUTPATIENT)
Dept: OPERATING ROOM | Age: 56
End: 2021-12-16
Payer: COMMERCIAL

## 2021-12-16 NOTE — ANESTHESIA PRE PROCEDURE
Department of Anesthesiology  Preprocedure Note       Name:  Demetrius Rudolph   Age:  60 y.o.  :  1965                                          MRN:  [id number]         Date:  2021      Surgeon: Helga Solomon):  Lauren Carcamo MD    Procedure: Procedure(s):  LEFT KNEE TOTAL ARTHROPLASTY ROBOTIC    Medications prior to admission:   Prior to Admission medications    Medication Sig Start Date End Date Taking? Authorizing Provider   celecoxib (CELEBREX) 200 MG capsule Take 1 capsule by mouth 2 times daily 21   Lauren Carcamo MD   ferrous sulfate (IRON 325) 325 (65 Fe) MG tablet Take 325 mg by mouth daily (with breakfast)    Historical Provider, MD   calcium carbonate (OYSTER SHELL CALCIUM 500 MG) 1250 (500 Ca) MG tablet Take by mouth    Historical Provider, MD   Cholecalciferol (VITAMIN D3) 1.25 MG (03008 UT) CAPS TAKE ONE CAPSULE BY MOUTH ONCE A WEEK 21   Historical Provider, MD       Current medications:    No current facility-administered medications for this encounter.      Current Outpatient Medications   Medication Sig Dispense Refill    celecoxib (CELEBREX) 200 MG capsule Take 1 capsule by mouth 2 times daily 60 capsule 1    ferrous sulfate (IRON 325) 325 (65 Fe) MG tablet Take 325 mg by mouth daily (with breakfast)      calcium carbonate (OYSTER SHELL CALCIUM 500 MG) 1250 (500 Ca) MG tablet Take by mouth      Cholecalciferol (VITAMIN D3) 1.25 MG (42035 UT) CAPS TAKE ONE CAPSULE BY MOUTH ONCE A WEEK         Allergies:  No Known Allergies    Problem List:    Patient Active Problem List   Diagnosis Code    Chest pain R07.9    S/P total knee replacement, right Z96.651    Primary osteoarthritis of left knee M17.12       Past Medical History:        Diagnosis Date    Arthritis     Arthritis of knee, left        Past Surgical History:        Procedure Laterality Date    JOINT REPLACEMENT      scheduled 10/6/21 for right knee    TOTAL KNEE ARTHROPLASTY Right 10/6/2021    RIGHT KNEE TOTAL ARTHROPLASTY ROBOTIC performed by Tigist Liao MD [facility] OR       Social History:    Social History     Tobacco Use    Smoking status: Never Smoker    Smokeless tobacco: Never Used   Substance Use Topics    Alcohol use: Yes     Comment: occasionally                                Counseling given: Not Answered      Vital Signs (Current): There were no vitals filed for this visit. BP Readings from Last 3 Encounters:   12/07/21 (!) 143/74   10/07/21 (!) 153/93   10/06/21 (!) 94/53       NPO Status:                                                                                 BMI:   Wt Readings from Last 3 Encounters:   12/07/21 157 lb 3.2 oz (71.3 kg)   11/23/21 154 lb (69.9 kg)   10/26/21 154 lb (69.9 kg)     There is no height or weight on file to calculate BMI.    CBC:   Lab Results   Component Value Date    WBC 5.3 12/07/2021    RBC 4.83 12/07/2021    HGB 12.4 12/07/2021    HCT 38.0 12/07/2021    MCV 78.7 12/07/2021    RDW 13.3 12/07/2021     12/07/2021       CMP:   Lab Results   Component Value Date     12/07/2021    K 4.2 12/07/2021     12/07/2021    CO2 29 12/07/2021    BUN 11 12/07/2021    CREATININE 0.7 12/07/2021    GFRAA >60 12/07/2021    LABGLOM >60 12/07/2021    GLUCOSE 97 12/07/2021    PROT 6.9 12/07/2021    CALCIUM 9.8 12/07/2021    BILITOT 0.3 12/07/2021    ALKPHOS 97 12/07/2021    AST 18 12/07/2021    ALT 20 12/07/2021       POC Tests: No results for input(s): POCGLU, POCNA, POCK, POCCL, POCBUN, POCHEMO, POCHCT in the last 72 hours.     Coags: No results found for: PROTIME, INR, APTT    HCG (If Applicable):   Lab Results   Component Value Date    PREGTESTUR NEGATIVE 02/03/2014        ABGs: No results found for: PHART, PO2ART, GZB3RWO, UBJ1TRF, BEART, T5VSXEDZ     Type & Screen (If Applicable):  No results found for: LABABO, LABRH    Drug/Infectious Status (If Applicable):  No results found for: HIV, HEPCAB    COVID-19 Screening (If Applicable):   Lab Results   Component Value Date    COVID19 NOT DETECTED 12/13/2021           Anesthesia Evaluation   no history of anesthetic complications:   Airway:         Dental:          Pulmonary:Negative Pulmonary ROS                              Cardiovascular:  Exercise tolerance: good (>4 METS),            Beta Blocker:  Not on Beta Blocker         Neuro/Psych:   Negative Neuro/Psych ROS              GI/Hepatic/Renal: Neg GI/Hepatic/Renal ROS            Endo/Other:    (+) : arthritis: OA., .                 Abdominal:             Vascular: negative vascular ROS. Other Findings:             Anesthesia Plan      MAC, regional and spinal     ASA 2       Induction: intravenous. Pre Anesthesia Assessment complete. Chart reviewed on 12/16/2021. This is a chart review only.       Jia Elizabeth, DO   12/16/2021

## 2021-12-20 ENCOUNTER — ANESTHESIA (OUTPATIENT)
Dept: OPERATING ROOM | Age: 56
End: 2021-12-20
Payer: COMMERCIAL

## 2021-12-20 ENCOUNTER — HOSPITAL ENCOUNTER (OUTPATIENT)
Age: 56
Setting detail: OBSERVATION
Discharge: HOME | End: 2021-12-21
Attending: ORTHOPAEDIC SURGERY | Admitting: ORTHOPAEDIC SURGERY
Payer: COMMERCIAL

## 2021-12-20 ENCOUNTER — APPOINTMENT (OUTPATIENT)
Dept: GENERAL RADIOLOGY | Age: 56
End: 2021-12-20
Attending: ORTHOPAEDIC SURGERY
Payer: COMMERCIAL

## 2021-12-20 VITALS
RESPIRATION RATE: 16 BRPM | OXYGEN SATURATION: 100 % | SYSTOLIC BLOOD PRESSURE: 101 MMHG | DIASTOLIC BLOOD PRESSURE: 68 MMHG | TEMPERATURE: 98.6 F

## 2021-12-20 DIAGNOSIS — Z96.652 S/P TOTAL KNEE REPLACEMENT, LEFT: Primary | ICD-10-CM

## 2021-12-20 PROBLEM — M17.32 POST-TRAUMATIC OSTEOARTHRITIS OF LEFT KNEE: Status: ACTIVE | Noted: 2021-12-20

## 2021-12-20 PROCEDURE — 3700000000 HC ANESTHESIA ATTENDED CARE: Performed by: ORTHOPAEDIC SURGERY

## 2021-12-20 PROCEDURE — 3600000019 HC SURGERY ROBOT ADDTL 15MIN: Performed by: ORTHOPAEDIC SURGERY

## 2021-12-20 PROCEDURE — G0378 HOSPITAL OBSERVATION PER HR: HCPCS

## 2021-12-20 PROCEDURE — 6360000002 HC RX W HCPCS: Performed by: NURSE ANESTHETIST, CERTIFIED REGISTERED

## 2021-12-20 PROCEDURE — 73560 X-RAY EXAM OF KNEE 1 OR 2: CPT

## 2021-12-20 PROCEDURE — 6370000000 HC RX 637 (ALT 250 FOR IP): Performed by: ORTHOPAEDIC SURGERY

## 2021-12-20 PROCEDURE — 20985 CPTR-ASST DIR MS PX: CPT | Performed by: ORTHOPAEDIC SURGERY

## 2021-12-20 PROCEDURE — 3600000009 HC SURGERY ROBOT BASE: Performed by: ORTHOPAEDIC SURGERY

## 2021-12-20 PROCEDURE — C1776 JOINT DEVICE (IMPLANTABLE): HCPCS | Performed by: ORTHOPAEDIC SURGERY

## 2021-12-20 PROCEDURE — 2500000003 HC RX 250 WO HCPCS: Performed by: NURSE ANESTHETIST, CERTIFIED REGISTERED

## 2021-12-20 PROCEDURE — 97162 PT EVAL MOD COMPLEX 30 MIN: CPT

## 2021-12-20 PROCEDURE — 7100000001 HC PACU RECOVERY - ADDTL 15 MIN: Performed by: ORTHOPAEDIC SURGERY

## 2021-12-20 PROCEDURE — 2500000003 HC RX 250 WO HCPCS: Performed by: ORTHOPAEDIC SURGERY

## 2021-12-20 PROCEDURE — 64447 NJX AA&/STRD FEMORAL NRV IMG: CPT | Performed by: NURSE ANESTHETIST, CERTIFIED REGISTERED

## 2021-12-20 PROCEDURE — 2709999900 HC NON-CHARGEABLE SUPPLY: Performed by: ORTHOPAEDIC SURGERY

## 2021-12-20 PROCEDURE — 6360000002 HC RX W HCPCS: Performed by: ORTHOPAEDIC SURGERY

## 2021-12-20 PROCEDURE — 2720000010 HC SURG SUPPLY STERILE: Performed by: ORTHOPAEDIC SURGERY

## 2021-12-20 PROCEDURE — C1713 ANCHOR/SCREW BN/BN,TIS/BN: HCPCS | Performed by: ORTHOPAEDIC SURGERY

## 2021-12-20 PROCEDURE — 27447 TOTAL KNEE ARTHROPLASTY: CPT

## 2021-12-20 PROCEDURE — 2580000003 HC RX 258: Performed by: ORTHOPAEDIC SURGERY

## 2021-12-20 PROCEDURE — 97116 GAIT TRAINING THERAPY: CPT

## 2021-12-20 PROCEDURE — 6360000002 HC RX W HCPCS: Performed by: ANESTHESIOLOGY

## 2021-12-20 PROCEDURE — 7100000000 HC PACU RECOVERY - FIRST 15 MIN: Performed by: ORTHOPAEDIC SURGERY

## 2021-12-20 PROCEDURE — 27447 TOTAL KNEE ARTHROPLASTY: CPT | Performed by: ORTHOPAEDIC SURGERY

## 2021-12-20 PROCEDURE — S2900 ROBOTIC SURGICAL SYSTEM: HCPCS | Performed by: ORTHOPAEDIC SURGERY

## 2021-12-20 PROCEDURE — 3700000001 HC ADD 15 MINUTES (ANESTHESIA): Performed by: ORTHOPAEDIC SURGERY

## 2021-12-20 DEVICE — IMPLANTABLE DEVICE: Type: IMPLANTABLE DEVICE | Site: KNEE | Status: FUNCTIONAL

## 2021-12-20 DEVICE — BASEPLATE TIB SZ 2 KNEE TRITANIUM CEM PRI LO PROF TRIATHLON: Type: IMPLANTABLE DEVICE | Site: KNEE | Status: FUNCTIONAL

## 2021-12-20 DEVICE — IMPLANT PATELLAR DIA29MM THK9MM X3 ASYMMETRIC TRIATHLON: Type: IMPLANTABLE DEVICE | Site: KNEE | Status: FUNCTIONAL

## 2021-12-20 DEVICE — CEMENT BNE 40GM HI VISC RADPQ FOR REV SURG: Type: IMPLANTABLE DEVICE | Site: KNEE | Status: FUNCTIONAL

## 2021-12-20 RX ORDER — HYDROMORPHONE HCL 110MG/55ML
0.25 PATIENT CONTROLLED ANALGESIA SYRINGE INTRAVENOUS EVERY 5 MIN PRN
Status: DISCONTINUED | OUTPATIENT
Start: 2021-12-20 | End: 2021-12-20 | Stop reason: HOSPADM

## 2021-12-20 RX ORDER — PROMETHAZINE HYDROCHLORIDE 25 MG/ML
6.25 INJECTION, SOLUTION INTRAMUSCULAR; INTRAVENOUS
Status: DISCONTINUED | OUTPATIENT
Start: 2021-12-20 | End: 2021-12-20 | Stop reason: HOSPADM

## 2021-12-20 RX ORDER — PROPOFOL 10 MG/ML
INJECTION, EMULSION INTRAVENOUS CONTINUOUS PRN
Status: DISCONTINUED | OUTPATIENT
Start: 2021-12-20 | End: 2021-12-20 | Stop reason: SDUPTHER

## 2021-12-20 RX ORDER — ACETAMINOPHEN 325 MG/1
650 TABLET ORAL EVERY 6 HOURS
Status: DISCONTINUED | OUTPATIENT
Start: 2021-12-20 | End: 2021-12-21 | Stop reason: HOSPADM

## 2021-12-20 RX ORDER — LABETALOL HYDROCHLORIDE 5 MG/ML
5 INJECTION, SOLUTION INTRAVENOUS EVERY 10 MIN PRN
Status: DISCONTINUED | OUTPATIENT
Start: 2021-12-20 | End: 2021-12-20 | Stop reason: HOSPADM

## 2021-12-20 RX ORDER — DEXAMETHASONE SODIUM PHOSPHATE 4 MG/ML
INJECTION, SOLUTION INTRA-ARTICULAR; INTRALESIONAL; INTRAMUSCULAR; INTRAVENOUS; SOFT TISSUE PRN
Status: DISCONTINUED | OUTPATIENT
Start: 2021-12-20 | End: 2021-12-20 | Stop reason: SDUPTHER

## 2021-12-20 RX ORDER — ONDANSETRON 2 MG/ML
INJECTION INTRAMUSCULAR; INTRAVENOUS PRN
Status: DISCONTINUED | OUTPATIENT
Start: 2021-12-20 | End: 2021-12-20 | Stop reason: SDUPTHER

## 2021-12-20 RX ORDER — FENTANYL CITRATE 50 UG/ML
INJECTION, SOLUTION INTRAMUSCULAR; INTRAVENOUS PRN
Status: DISCONTINUED | OUTPATIENT
Start: 2021-12-20 | End: 2021-12-20 | Stop reason: SDUPTHER

## 2021-12-20 RX ORDER — LIDOCAINE HYDROCHLORIDE 20 MG/ML
INJECTION, SOLUTION INTRAVENOUS PRN
Status: DISCONTINUED | OUTPATIENT
Start: 2021-12-20 | End: 2021-12-20 | Stop reason: SDUPTHER

## 2021-12-20 RX ORDER — HYDROMORPHONE HCL 110MG/55ML
0.5 PATIENT CONTROLLED ANALGESIA SYRINGE INTRAVENOUS
Status: DISCONTINUED | OUTPATIENT
Start: 2021-12-20 | End: 2021-12-21 | Stop reason: HOSPADM

## 2021-12-20 RX ORDER — SODIUM CHLORIDE 9 MG/ML
25 INJECTION, SOLUTION INTRAVENOUS PRN
Status: DISCONTINUED | OUTPATIENT
Start: 2021-12-20 | End: 2021-12-21 | Stop reason: HOSPADM

## 2021-12-20 RX ORDER — MORPHINE SULFATE 2 MG/ML
2 INJECTION, SOLUTION INTRAMUSCULAR; INTRAVENOUS EVERY 5 MIN PRN
Status: DISCONTINUED | OUTPATIENT
Start: 2021-12-20 | End: 2021-12-20 | Stop reason: HOSPADM

## 2021-12-20 RX ORDER — FENTANYL CITRATE 50 UG/ML
25 INJECTION, SOLUTION INTRAMUSCULAR; INTRAVENOUS EVERY 5 MIN PRN
Status: DISCONTINUED | OUTPATIENT
Start: 2021-12-20 | End: 2021-12-20 | Stop reason: HOSPADM

## 2021-12-20 RX ORDER — OXYCODONE HYDROCHLORIDE AND ACETAMINOPHEN 5; 325 MG/1; MG/1
1 TABLET ORAL EVERY 4 HOURS PRN
Status: DISCONTINUED | OUTPATIENT
Start: 2021-12-20 | End: 2021-12-21 | Stop reason: HOSPADM

## 2021-12-20 RX ORDER — SODIUM CHLORIDE, SODIUM LACTATE, POTASSIUM CHLORIDE, CALCIUM CHLORIDE 600; 310; 30; 20 MG/100ML; MG/100ML; MG/100ML; MG/100ML
INJECTION, SOLUTION INTRAVENOUS CONTINUOUS
Status: DISCONTINUED | OUTPATIENT
Start: 2021-12-20 | End: 2021-12-20

## 2021-12-20 RX ORDER — OXYCODONE HYDROCHLORIDE AND ACETAMINOPHEN 5; 325 MG/1; MG/1
2 TABLET ORAL EVERY 4 HOURS PRN
Status: DISCONTINUED | OUTPATIENT
Start: 2021-12-20 | End: 2021-12-21 | Stop reason: HOSPADM

## 2021-12-20 RX ORDER — CEFAZOLIN SODIUM 2 G/100ML
2000 INJECTION, SOLUTION INTRAVENOUS EVERY 8 HOURS
Status: COMPLETED | OUTPATIENT
Start: 2021-12-20 | End: 2021-12-21

## 2021-12-20 RX ORDER — HYDROMORPHONE HCL 110MG/55ML
0.5 PATIENT CONTROLLED ANALGESIA SYRINGE INTRAVENOUS EVERY 5 MIN PRN
Status: DISCONTINUED | OUTPATIENT
Start: 2021-12-20 | End: 2021-12-20 | Stop reason: HOSPADM

## 2021-12-20 RX ORDER — SODIUM CHLORIDE 9 MG/ML
INJECTION, SOLUTION INTRAVENOUS CONTINUOUS PRN
Status: DISCONTINUED | OUTPATIENT
Start: 2021-12-20 | End: 2021-12-20

## 2021-12-20 RX ORDER — ROPIVACAINE HYDROCHLORIDE 5 MG/ML
INJECTION, SOLUTION EPIDURAL; INFILTRATION; PERINEURAL
Status: COMPLETED | OUTPATIENT
Start: 2021-12-20 | End: 2021-12-20

## 2021-12-20 RX ORDER — MIDAZOLAM HYDROCHLORIDE 1 MG/ML
INJECTION INTRAMUSCULAR; INTRAVENOUS PRN
Status: DISCONTINUED | OUTPATIENT
Start: 2021-12-20 | End: 2021-12-20 | Stop reason: SDUPTHER

## 2021-12-20 RX ORDER — HYDROMORPHONE HCL 110MG/55ML
0.25 PATIENT CONTROLLED ANALGESIA SYRINGE INTRAVENOUS
Status: DISCONTINUED | OUTPATIENT
Start: 2021-12-20 | End: 2021-12-21 | Stop reason: HOSPADM

## 2021-12-20 RX ORDER — SODIUM CHLORIDE 0.9 % (FLUSH) 0.9 %
10 SYRINGE (ML) INJECTION PRN
Status: DISCONTINUED | OUTPATIENT
Start: 2021-12-20 | End: 2021-12-21 | Stop reason: HOSPADM

## 2021-12-20 RX ORDER — PHENYLEPHRINE HCL IN 0.9% NACL 1 MG/10 ML
SYRINGE (ML) INTRAVENOUS PRN
Status: DISCONTINUED | OUTPATIENT
Start: 2021-12-20 | End: 2021-12-20 | Stop reason: SDUPTHER

## 2021-12-20 RX ORDER — HYDRALAZINE HYDROCHLORIDE 20 MG/ML
5 INJECTION INTRAMUSCULAR; INTRAVENOUS EVERY 10 MIN PRN
Status: DISCONTINUED | OUTPATIENT
Start: 2021-12-20 | End: 2021-12-20 | Stop reason: HOSPADM

## 2021-12-20 RX ORDER — TRANEXAMIC ACID 100 MG/ML
1000 INJECTION, SOLUTION INTRAVENOUS
Status: COMPLETED | OUTPATIENT
Start: 2021-12-20 | End: 2021-12-20

## 2021-12-20 RX ORDER — SODIUM CHLORIDE, SODIUM LACTATE, POTASSIUM CHLORIDE, CALCIUM CHLORIDE 600; 310; 30; 20 MG/100ML; MG/100ML; MG/100ML; MG/100ML
INJECTION, SOLUTION INTRAVENOUS CONTINUOUS
Status: DISCONTINUED | OUTPATIENT
Start: 2021-12-20 | End: 2021-12-21 | Stop reason: HOSPADM

## 2021-12-20 RX ORDER — SODIUM CHLORIDE 0.9 % (FLUSH) 0.9 %
10 SYRINGE (ML) INJECTION EVERY 12 HOURS SCHEDULED
Status: DISCONTINUED | OUTPATIENT
Start: 2021-12-20 | End: 2021-12-21 | Stop reason: HOSPADM

## 2021-12-20 RX ORDER — KETAMINE HCL 50MG/ML(1)
SYRINGE (ML) INTRAVENOUS PRN
Status: DISCONTINUED | OUTPATIENT
Start: 2021-12-20 | End: 2021-12-20 | Stop reason: SDUPTHER

## 2021-12-20 RX ORDER — FERROUS SULFATE 325(65) MG
325 TABLET ORAL
Status: DISCONTINUED | OUTPATIENT
Start: 2021-12-21 | End: 2021-12-21 | Stop reason: HOSPADM

## 2021-12-20 RX ORDER — DOCUSATE SODIUM 100 MG/1
100 CAPSULE, LIQUID FILLED ORAL DAILY
Status: DISCONTINUED | OUTPATIENT
Start: 2021-12-20 | End: 2021-12-21 | Stop reason: HOSPADM

## 2021-12-20 RX ADMIN — Medication 50 MCG: at 11:36

## 2021-12-20 RX ADMIN — DOCUSATE SODIUM 100 MG: 100 CAPSULE ORAL at 17:49

## 2021-12-20 RX ADMIN — Medication 100 MCG: at 12:35

## 2021-12-20 RX ADMIN — LIDOCAINE HYDROCHLORIDE 100 MG: 20 INJECTION, SOLUTION INTRAVENOUS at 10:50

## 2021-12-20 RX ADMIN — SODIUM CHLORIDE, POTASSIUM CHLORIDE, SODIUM LACTATE AND CALCIUM CHLORIDE: 600; 310; 30; 20 INJECTION, SOLUTION INTRAVENOUS at 12:10

## 2021-12-20 RX ADMIN — OXYCODONE AND ACETAMINOPHEN 1 TABLET: 5; 325 TABLET ORAL at 20:08

## 2021-12-20 RX ADMIN — Medication 50 MCG: at 11:05

## 2021-12-20 RX ADMIN — ACETAMINOPHEN 650 MG: 325 TABLET ORAL at 17:48

## 2021-12-20 RX ADMIN — Medication 25 MCG: at 11:53

## 2021-12-20 RX ADMIN — Medication 50 MCG: at 11:20

## 2021-12-20 RX ADMIN — Medication 25 MG: at 10:50

## 2021-12-20 RX ADMIN — ASPIRIN 325 MG: 325 TABLET, COATED ORAL at 20:08

## 2021-12-20 RX ADMIN — FENTANYL CITRATE 25 MCG: 50 INJECTION, SOLUTION INTRAMUSCULAR; INTRAVENOUS at 11:31

## 2021-12-20 RX ADMIN — FENTANYL CITRATE 25 MCG: 50 INJECTION, SOLUTION INTRAMUSCULAR; INTRAVENOUS at 12:42

## 2021-12-20 RX ADMIN — ROPIVACAINE HYDROCHLORIDE 20 ML: 5 INJECTION, SOLUTION EPIDURAL; INFILTRATION; PERINEURAL at 10:30

## 2021-12-20 RX ADMIN — MIDAZOLAM 2 MG: 1 INJECTION INTRAMUSCULAR; INTRAVENOUS at 10:29

## 2021-12-20 RX ADMIN — TRANEXAMIC ACID 1000 MG: 100 INJECTION, SOLUTION INTRAVENOUS at 11:11

## 2021-12-20 RX ADMIN — Medication 75 MCG: at 11:25

## 2021-12-20 RX ADMIN — CEFAZOLIN 2000 MG: 1 INJECTION, POWDER, FOR SOLUTION INTRAMUSCULAR; INTRAVENOUS; PARENTERAL at 10:54

## 2021-12-20 RX ADMIN — PROPOFOL 75 MCG/KG/MIN: 10 INJECTION, EMULSION INTRAVENOUS at 10:49

## 2021-12-20 RX ADMIN — Medication 100 MCG: at 12:19

## 2021-12-20 RX ADMIN — ONDANSETRON 4 MG: 2 INJECTION INTRAMUSCULAR; INTRAVENOUS at 12:43

## 2021-12-20 RX ADMIN — Medication 25 MCG: at 11:43

## 2021-12-20 RX ADMIN — DEXAMETHASONE SODIUM PHOSPHATE 4 MG: 4 INJECTION, SOLUTION INTRAMUSCULAR; INTRAVENOUS at 10:50

## 2021-12-20 RX ADMIN — SODIUM CHLORIDE, POTASSIUM CHLORIDE, SODIUM LACTATE AND CALCIUM CHLORIDE: 600; 310; 30; 20 INJECTION, SOLUTION INTRAVENOUS at 08:10

## 2021-12-20 RX ADMIN — Medication 25 MG: at 11:32

## 2021-12-20 RX ADMIN — FENTANYL CITRATE 25 MCG: 50 INJECTION, SOLUTION INTRAMUSCULAR; INTRAVENOUS at 11:01

## 2021-12-20 RX ADMIN — SODIUM CHLORIDE, POTASSIUM CHLORIDE, SODIUM LACTATE AND CALCIUM CHLORIDE: 600; 310; 30; 20 INJECTION, SOLUTION INTRAVENOUS at 14:19

## 2021-12-20 RX ADMIN — CEFAZOLIN SODIUM 2000 MG: 2 INJECTION, SOLUTION INTRAVENOUS at 19:24

## 2021-12-20 RX ADMIN — FENTANYL CITRATE 25 MCG: 50 INJECTION, SOLUTION INTRAMUSCULAR; INTRAVENOUS at 11:14

## 2021-12-20 RX ADMIN — Medication 50 MCG: at 12:07

## 2021-12-20 ASSESSMENT — PULMONARY FUNCTION TESTS
PIF_VALUE: 0
PIF_VALUE: 1
PIF_VALUE: 0
PIF_VALUE: 1
PIF_VALUE: 0
PIF_VALUE: 1
PIF_VALUE: 0

## 2021-12-20 ASSESSMENT — PAIN SCALES - GENERAL
PAINLEVEL_OUTOF10: 2
PAINLEVEL_OUTOF10: 0
PAINLEVEL_OUTOF10: 2
PAINLEVEL_OUTOF10: 3
PAINLEVEL_OUTOF10: 0
PAINLEVEL_OUTOF10: 4
PAINLEVEL_OUTOF10: 0
PAINLEVEL_OUTOF10: 0
PAINLEVEL_OUTOF10: 4

## 2021-12-20 ASSESSMENT — ENCOUNTER SYMPTOMS
COLOR CHANGE: 0
WHEEZING: 0
VOMITING: 0
EYE PAIN: 0
EYE REDNESS: 0
SHORTNESS OF BREATH: 0
CHEST TIGHTNESS: 0

## 2021-12-20 ASSESSMENT — PAIN DESCRIPTION - LOCATION
LOCATION: KNEE

## 2021-12-20 ASSESSMENT — PAIN DESCRIPTION - PAIN TYPE
TYPE: SURGICAL PAIN

## 2021-12-20 ASSESSMENT — PAIN - FUNCTIONAL ASSESSMENT: PAIN_FUNCTIONAL_ASSESSMENT: 0-10

## 2021-12-20 NOTE — ANESTHESIA PROCEDURE NOTES
Peripheral Block    Patient location during procedure: pre-op  Start time: 12/20/2021 10:28 AM  End time: 12/20/2021 10:36 AM  Staffing  Performed: other anesthesia staff   Anesthesiologist: Shawna Canales MD  Resident/CRNA: VICTORIANO Huntley CRNA  Other anesthesia staff: Karolina Mckeon RN  Preanesthetic Checklist  Completed: patient identified, IV checked, site marked, risks and benefits discussed, surgical consent, monitors and equipment checked, pre-op evaluation, timeout performed, anesthesia consent given, oxygen available and patient being monitored  Peripheral Block  Patient position: supine  Prep: ChloraPrep  Patient monitoring: cardiac monitor, continuous pulse ox, continuous capnometry, IV access and frequent blood pressure checks  Block type: Femoral  Laterality: left  Injection technique: single-shot  Guidance: ultrasound guided  Local infiltration: lidocaine  Infiltration strength: 1 %  Dose: 3 mL  Adductor canal  Provider prep: mask and sterile gloves  Local infiltration: lidocaine  Needle  Needle type: combined needle/nerve stimulator   Needle gauge: 20 G  Needle length: 10 cm  Needle localization: ultrasound guidance  Catheter type: closed end  Catheter size: 20 G  Test dose: negative  Assessment  Injection assessment: negative aspiration for heme, no paresthesia on injection and local visualized surrounding nerve on ultrasound  Paresthesia pain: none  Slow fractionated injection: yes  Hemodynamics: stable  Medications Administered  Ropivacaine (NAROPIN) 0.5% injection, 20 mL  Reason for block: post-op pain management and at surgeon's request

## 2021-12-20 NOTE — OP NOTE
Operative Note      Patient: Bassam Perkins  YOB: 1965  MRN: [id number]    Date of Procedure: 12/20/2021    Pre-Op Diagnosis: Primary osteoarthritis of left knee [M17.12]    Post-Op Diagnosis: Same       Procedure(s):  LEFT KNEE TOTAL ARTHROPLASTY ROBOTIC    Surgeon(s):  Dharmesh Robles MD    Assistant:     SANDRA Mcwilliams. He assisted with patient positioning, holding retractors, placement of the implants, closure of the wounds, and application of the dressings  Anesthesia: Spinal    Estimated Blood Loss (mL): less than 990     Complications: None    Specimens:   * No specimens in log *    Implants:  Implant Name Type Inv.  Item Serial No.  Lot No. LRB No. Used Action   CEMENT BNE 40GM HI VISC RADPQ FOR REV SURG  CEMENT BNE 40GM HI VISC RADPQ FOR REV SURG  [employer]- [id number] Left 1 Implanted   CEMENT BNE 40GM HI VISC RADPQ FOR REV SURG  CEMENT BNE 40GM HI VISC RADPQ FOR REV SURG  [employer]- [id number] Left 1 Implanted   COMPONENT FEM SZ 2 L KNEE CRUCE RET JULITO TRIATHLON  COMPONENT FEM SZ 2 L KNEE CRUCE RET JULITO TRIATHLON  [employer] [facility] [id number] Left 1 Implanted   BASEPLATE TIB SZ 2 KNEE TRITANIUM JULITO YAYA LO PROF TRIATHLON  BASEPLATE TIB SZ 2 KNEE TRITANIUM JULITO YAYA LO PROF TRIATHLON  [employer] [facility] [id number] Left 1 Implanted   IMPLANT PATELLAR [id number] THK9MM X3 ASYMMETRIC TRIATHLON  IMPLANT PATELLAR [id number] THK9MM X3 ASYMMETRIC TRIATHLON  [employer] [facility] [id number] Left 1 Implanted   INSERT TIB CR 2 9 MM KNEE 5/PK X3 TRIATHLON  INSERT TIB CR 2 9 MM KNEE 5/PK X3 TRIATHLON  [employer] [facility] [id number] Left 1 Implanted         Drains: * No LDAs found *    Findings:     Detailed Description of Procedure:   Implants:  Placida triathlon cemented total knee replacement size 2  CR femoral component, size 2 tibial baseplate, size 9mm CR articular surface liner, and a size 39 asymmetric patella    The patient was identified in the preoperative area and the site was marked. The patient was taken back to the operating room placed supine on the operative table. The above anesthesia was initiated. The lower extremity was prepped and draped in sterile fashion with a thigh tourniquet. Timeout was performed and preoperative antibiotics were given. Leg was exsanguinated with an Esmarch and tourniquet was inflated to 325 mmHg and deflated at the conclusion of the case during wound closure after less than 100 minutes of tourniquet time. A midline incision was made over the knee and dissection was carried down through subcutaneous tissues and bleeding was controlled with the Bovie. A medial parapatellar arthrotomy was performed to gain access to the knee joint. There was evidence of tricompartmental extensive degenerative joint disease. Release was performed along the deep fibers of the MCL at the medial tibia. ACL was resected. Portions of the fat pad were excised. Pins were placed at the femur and tibia for the robotic arrays. The center of the hip and ankle were identified with the robotic system. The blue probe was used to identify appropriate data points for identifying the anatomy of the femur and tibia. Using the robotic information, the knee was balanced in flexion and extension with appropriate positioning of the trial components using the computer program.    The robotic arm was brought into the field and bony cuts were made at the distal femur and proximal tibia. Excess bone was removed and the medial lateral meniscus were excised. The PCL was protected and intact. Trial of tibial component was placed and pinned in the appropriate rotation. Trial CR femoral component was impacted into place as well. Trial CR articular surface liners were placed in the knee and the knee was brought through range of motion and stress examination.   The size 2 CR articular surface liner provided the knee with excellent stability full range of motion with no tightness in flexion. The patella was resurfaced with a cutting guide. A size 29 asymmetric patella trial component was inserted. The knee was again taken through range of motion and there was excellent patellofemoral tracking. The trial components were removed. The knee was thoroughly irrigated with the pulse lavage. Cement was applied to the proximal tibia and distal femur. The size 2 tibial component was impacted into place and seated well with excellent stability. Next the size 2 CR femoral component was impacted and placed in seated well with excellent stability. The size  29 patella was cemented in place as well. The trial liner was inserted the knee was brought into extension while the cement hardened. The knee was trialed again and the size 9mm CR articular surface liner provided excellent stability and range of motion with improved alignment of the knee. The liner was then implanted. The knee was injected with 30 mL of half percent Marcaine with epinephrine and 30 mg of Toradol at the arthrotomy site and joint capsule. The tourniquet was deflated and bleeding was well controlled with the Bovie. The medial parapatellar arthrotomy was closed with a #1 strata fix suture. Deep subcutaneous layers were closed with buried 2-0 Vicryl. Skin was then closed with a running 3-0 strata fix subcuticular stitch followed by a Prineo Dermabond dressing. A sterile bandage was applied with 4 x 8's, ABD, sterile web roll, ice therapy pad and an Ace wrap. The patient was awakened and taken to PACU in stable condition. All sponge and needle counts were correct. The patient will be admitted to the hospital for pain control, physical therapy, and medical monitoring. The patient will be on chemical DVT prophylaxis and will be weightbearing as tolerated.     Electronically signed by Jed Falcon MD on 12/20/2021 at 1:21 PM

## 2021-12-20 NOTE — ANESTHESIA PRE PROCEDURE
Department of Anesthesiology  Preprocedure Note       Name:  Stacey Mejia   Age:  60 y.o.  :  1965                                          MRN:  [id number]         Date:  2021      Surgeon: Laura Cummins):  Wing Sullivan MD    Procedure: Procedure(s):  LEFT KNEE TOTAL ARTHROPLASTY ROBOTIC    Medications prior to admission:   Prior to Admission medications    Medication Sig Start Date End Date Taking?  Authorizing Provider   celecoxib (CELEBREX) 200 MG capsule Take 1 capsule by mouth 2 times daily 21  Yes Wing Sullivan MD   ferrous sulfate (IRON 325) 325 (65 Fe) MG tablet Take 325 mg by mouth daily (with breakfast)   Yes Historical Provider, MD   calcium carbonate (OYSTER SHELL CALCIUM 500 MG) 1250 (500 Ca) MG tablet Take by mouth   Yes Historical Provider, MD   Cholecalciferol (VITAMIN D3) 1.25 MG (91357 UT) CAPS TAKE ONE CAPSULE BY MOUTH ONCE A WEEK 21  Yes Historical Provider, MD       Current medications:    Current Facility-Administered Medications   Medication Dose Route Frequency Provider Last Rate Last Admin    ceFAZolin (ANCEF) 1,000 mg in dextrose 5 % 50 mL IVPB (mini-bag)  1,000 mg IntraVENous Once Wing Sullivan MD        lactated ringers infusion   IntraVENous Continuous Wing Sullivan MD 40 mL/hr at 21 0810 New Bag at 21 0810    tranexamic acid (CYKLOKAPRON) injection 1,000 mg  1,000 mg IntraVENous On Call to [facility] [address], MD           Allergies:  No Known Allergies    Problem List:    Patient Active Problem List   Diagnosis Code    Chest pain R07.9    S/P total knee replacement, right Z96.651    Primary osteoarthritis of left knee M17.12       Past Medical History:        Diagnosis Date    Arthritis     Arthritis of knee, left        Past Surgical History:        Procedure Laterality Date    JOINT REPLACEMENT      scheduled 10/6/21 for right knee    TOTAL KNEE ARTHROPLASTY Right 10/6/2021    RIGHT KNEE TOTAL ARTHROPLASTY ROBOTIC performed by Faith Simons Sangeetha Victoria MD at [facility] [place] OR       Social History:    Social History     Tobacco Use    Smoking status: Never Smoker    Smokeless tobacco: Never Used   Substance Use Topics    Alcohol use: Yes     Comment: occasionally                                Counseling given: Not Answered      Vital Signs (Current):   Vitals:    12/20/21 0730   BP: (!) 157/85   Pulse: 85   Resp: 16   Temp: 36.1 °C (97 °F)   TempSrc: Temporal   SpO2: 100%   Weight: 157 lb (71.2 kg)   Height: 5' 1\" (1.549 m)                                              BP Readings from Last 3 Encounters:   12/20/21 (!) 157/85   12/07/21 (!) 143/74   10/07/21 (!) 153/93       NPO Status: Time of last liquid consumption: 2200                        Time of last solid consumption: 2030                        Date of last liquid consumption: 12/19/21                        Date of last solid food consumption: 12/19/21    BMI:   Wt Readings from Last 3 Encounters:   12/20/21 157 lb (71.2 kg)   12/07/21 157 lb 3.2 oz (71.3 kg)   11/23/21 154 lb (69.9 kg)     Body mass index is 29.66 kg/m². CBC:   Lab Results   Component Value Date    WBC 5.3 12/07/2021    RBC 4.83 12/07/2021    HGB 12.4 12/07/2021    HCT 38.0 12/07/2021    MCV 78.7 12/07/2021    RDW 13.3 12/07/2021     12/07/2021       CMP:   Lab Results   Component Value Date     12/07/2021    K 4.2 12/07/2021     12/07/2021    CO2 29 12/07/2021    BUN 11 12/07/2021    CREATININE 0.7 12/07/2021    GFRAA >60 12/07/2021    LABGLOM >60 12/07/2021    GLUCOSE 97 12/07/2021    PROT 6.9 12/07/2021    CALCIUM 9.8 12/07/2021    BILITOT 0.3 12/07/2021    ALKPHOS 97 12/07/2021    AST 18 12/07/2021    ALT 20 12/07/2021       POC Tests: No results for input(s): POCGLU, POCNA, POCK, POCCL, POCBUN, POCHEMO, POCHCT in the last 72 hours.     Coags: No results found for: PROTIME, INR, APTT    HCG (If Applicable):   Lab Results   Component Value Date    PREGTESTUR NEGATIVE 02/03/2014        ABGs: No results found for: PHART, PO2ART, ZSF9PFK, PWG0QAD, BEART, V7RZJPRK     Type & Screen (If Applicable):  No results found for: LABABO, LABRH    Drug/Infectious Status (If Applicable):  No results found for: HIV, HEPCAB    COVID-19 Screening (If Applicable):   Lab Results   Component Value Date    COVID19 NOT DETECTED 12/13/2021           Anesthesia Evaluation   no history of anesthetic complications:   Airway: Mallampati: II  TM distance: >3 FB   Neck ROM: full  Mouth opening: > = 3 FB Dental: normal exam         Pulmonary:Negative Pulmonary ROS and normal exam                               Cardiovascular:  Exercise tolerance: good (>4 METS),            Beta Blocker:  Not on Beta Blocker         Neuro/Psych:   Negative Neuro/Psych ROS              GI/Hepatic/Renal: Neg GI/Hepatic/Renal ROS            Endo/Other:    (+) : arthritis: OA., .                 Abdominal:             Vascular: negative vascular ROS. Other Findings:             Anesthesia Plan      MAC, regional and spinal     ASA 2       Induction: intravenous. Anesthetic plan and risks discussed with patient. Plan discussed with surgical team.            Pre Anesthesia Assessment complete. Chart reviewed on 12/20/2021. This is a chart review only.       VICTORIANO Murry - CRNA   12/20/2021

## 2021-12-20 NOTE — H&P
Chief complaint: Left knee pain    Alexandra Lucero is a 60 y.o. female returns today for follow-up of her right total knee replacement and also further discussion of her worsening left knee pain.     She has done well through advancing her activities with physical therapy and feels that her right knee is making good progress.       She has noticed some increasing discomfort and pain and swelling along the left knee while doing the therapy exercises. She feels somewhat unstable on the left knee like it wants to shift or give out on her when doing single leg bands and weightbearing activities. Pain is most significant along the medial joint line. Pain is constant throughout the day of the left knee and worse with repetitive activities        Patient returns to the office today for FU of the right TKA DOS 10/6/21. Pt states pain today is a 0/10 pt states she has one more session with therapy. Pt states at this time she has no complaints of the right knee. Pt states the left knee is a constant achy pain globally in the knee. Pt states she is taking her  Celebrex to help with her left knee pain. Pt states going up and down the stairs will cause a sharp stabbing pain in the left knee. Medical History  Patient's medications, allergies, past medical, surgical, social and family histories were reviewed and updated as appropriate. Past Medical History:   Diagnosis Date    Arthritis     Arthritis of knee, left      Past Surgical History:   Procedure Laterality Date    JOINT REPLACEMENT      scheduled 10/6/21 for right knee    TOTAL KNEE ARTHROPLASTY Right 10/6/2021    RIGHT KNEE TOTAL ARTHROPLASTY ROBOTIC performed by Maria Isabel Garcia MD at [place] [address] reviewed. No pertinent family history.   Social History     Socioeconomic History    Marital status:      Spouse name: Akiko Davenport Number of children: 2    Years of education: 12    Highest education level: None   Occupational History    None   Tobacco Use    Smoking status: Never Smoker    Smokeless tobacco: Never Used   Vaping Use    Vaping Use: Never used   Substance and Sexual Activity    Alcohol use: Yes     Comment: occasionally    Drug use: No    Sexual activity: Yes     Partners: Male   Other Topics Concern    None   Social History Narrative    None     Social Determinants of Health     Financial Resource Strain:     Difficulty of Paying Living Expenses: Not on file   Food Insecurity:     Worried About Running Out of Food in the Last Year: Not on file    Santos of Food in the Last Year: Not on file   Transportation Needs:     Lack of Transportation (Medical): Not on file    Lack of Transportation (Non-Medical):  Not on file   Physical Activity:     Days of Exercise per Week: Not on file    Minutes of Exercise per Session: Not on file   Stress:     Feeling of Stress : Not on file   Social Connections:     Frequency of Communication with Friends and Family: Not on file    Frequency of Social Gatherings with Friends and Family: Not on file    Attends [employer] Services: Not on file    Active Member of [address] [place] [address] or Organizations: Not on file    Attends Club or Organization Meetings: Not on file    Marital Status: Not on file   Intimate Partner Violence:     Fear of Current or Ex-Partner: Not on file    Emotionally Abused: Not on file    Physically Abused: Not on file    Sexually Abused: Not on file   Housing Stability:     Unable to Pay for Housing in the Last Year: Not on file    Number of [place] in the Last Year: Not on file    Unstable Housing in the Last Year: Not on file     Current Facility-Administered Medications   Medication Dose Route Frequency Provider Last Rate Last Admin    ceFAZolin (ANCEF) 1,000 mg in dextrose 5 % 50 mL IVPB (mini-bag)  1,000 mg IntraVENous Once Anastasia Steven MD        lactated ringers infusion   IntraVENous Continuous Anastasia Steven MD         No Known Allergies      Review of Systems   Constitutional: Negative for chills and fever. HENT: Negative for congestion and sneezing. Eyes: Negative for pain and redness. Respiratory: Negative for chest tightness, shortness of breath and wheezing. Cardiovascular: Negative for chest pain and palpitations. Gastrointestinal: Negative for vomiting. Musculoskeletal: Positive for arthralgias. Skin: Negative for color change and rash. Neurological: Negative for weakness and numbness. Psychiatric/Behavioral: Negative for agitation. The patient is not nervous/anxious. Examination:  General Exam:  Vitals: BP (!) 157/85   Pulse 85   Temp 97 °F (36.1 °C) (Temporal)   Resp 16   Ht 5' 1\" (1.549 m)   Wt 157 lb (71.2 kg)   SpO2 100%   BMI 29.66 kg/m²    Physical Exam     Right Lower Extremity:    The incision is healing well and skin edges are well approximated. No erythema, drainage, or induration. Mild soft tissue swelling present throughout the soft tissues of the knee. Range of motion is present from full extension to 120 degrees of flexion. Calf is soft and nontender. Negative Homans sign. Sensation and motor function is intact at the knee and ankle. Left Lower Extremity:  There is moderate to severe tenderness to palpation throughout the knee most significant along the medial joint line. There is a mild effusion present and mild global swelling at the knee anteriorly. Mild restricted range of motion at the knee with approximately 3 degrees short of full extension and knee flexion up to 130 degrees with pain at the extremes of motion. There is mild crepitation at the knee during active range of motion. There is mild varus knee alignment. There is 5 out of 5 strength with knee flexion and extension. There is no instability to varus or valgus stress testing or anterior and posterior drawer testing. Sensation is intact to light touch throughout the lower extremity.   There is a moderately positive James's test with tenderness to palpation and pain along the medial joint line. Skin is intact. Pulses intact    No pain with active range of motion of the hip. Strength and range of motion of the hip are intact. No tenderness to palpation at the hip. Diagnostic testing:  X-ray images were reviewed by myself and discussed with the patient:  X-ray images were reviewed by myself and discussed with the patient:     Narrative   3 views of the right knee show excellent position and alignment of the    total knee replacement with good bone cement interface and no evidence of    fracture or complication.  Mild anterior soft tissue swelling present.      X-ray images of the left knee from 9/7/2021:  4 views of the Left Knee:       There is evidence of moderate to severe degenerative changes present in    all 3 compartments of the knee most significant along the medial    compartment where there is advanced joint space collapse and near bone on    bone articulation with mild osteophyte formation.  There is subchondral    sclerosis present in medial compartment with cortical irregularities on    both sides of the joint.  There is a moderate varus alignment present.     There are prominent arthritic changes in the patellofemoral joint with    joint space narrowing and osteophyte formation.  Mild soft tissue swelling    present at the knee joint.  No fractures or loose bodies identified.  Mild    decreased bone mineral density throughout       Impression:   Severe varus tricompartmental arthritis                   Office Procedures:  Orders Placed This Encounter   Procedures    Incentive spirometry     Standing Status:   Standing     Number of Occurrences:   1       Assessment and Plan  1.   Left knee advanced primary osteoarthritis     2.  Status post right total knee replacement     She is doing well recovering from her right total knee replacement.       We discussed her worsening left knee pain and dysfunction. She has failed extensive conservative treatments as outlined in the previous notes. She would like to schedule moving forward with a left total knee replacement.     We discussed the severity of the degenerative findings on both on the x-rays and physical exam.  The patient feels that they have exhausted conservative measures. The patient has previously tried injections, physical therapy, over-the-counter pain medications, and activity modification. The pain level is severe and bothers the patient on a daily basis, including interrupting sleep at night. Activities of daily living are difficult to perform. The patient has trouble getting dressed, getting up from a seated position, climbing stairs, and has fear of falling.     The pain and dysfunction at the knee are severely limiting at this stage and the patient would like to consider surgical intervention.     I have recommended surgical intervention for a total knee replacement. We discussed the risks, benefits, and alternatives to surgery. We discussed the intended benefits from a well-functioning replacement and the anticipated recovery process. We discussed potential risks and complications including but not limited to DVT/PE, infection, stiffness, loosening, and fracture. We discussed pain control, physical therapy, and anticoagulation during the perioperative timeframe.     The patient would like to proceed with knee replacement surgery and will be enrolled in the joint replacement class     Plan will be to proceed with the left robotic assisted total knee replacement. She will be on aspirin postoperatively for DVT prophylaxis. History and Physical exam note from the office visit is copied above from epic. I have reviewed the note and examined the patient and find no relevant changes.      I have reviewed with the patient and/or family the risks, benefits, and alternatives to the procedure as well as expected postoperative course    Paolo Bonilla MD    Electronically signed by Paolo Bonilla MD on 12/20/2021 at 7:55 AM

## 2021-12-20 NOTE — ANESTHESIA PROCEDURE NOTES
Spinal Block    Patient location during procedure: OR  Start time: 12/20/2021 10:40 AM  End time: 12/20/2021 10:46 AM  Reason for block: primary anesthetic and at surgeon's request  Staffing  Performed: other anesthesia staff   Anesthesiologist: Teri Jaimes MD  Resident/CRNA: VICTORIANO Murry - NILA  Other anesthesia staff: Gala Steven RN  Preanesthetic Checklist  Completed: patient identified, IV checked, site marked, risks and benefits discussed, surgical consent, monitors and equipment checked, pre-op evaluation, timeout performed, anesthesia consent given, oxygen available and patient being monitored  Spinal Block  Patient position: sitting  Prep: ChloraPrep  Patient monitoring: cardiac monitor, continuous pulse ox, continuous capnometry and frequent blood pressure checks  Approach: midline  Location: L3/L4  Guidance: paresthesia technique  Provider prep: mask and sterile gloves  Agent: bupivacaine  Dose: 2.2  Dose: 2.2  Needle  Needle type: Pencan   Needle gauge: 25 G  Needle length: 3.5 in  Assessment  Sensory level: T10  Swirl obtained: Yes  CSF: clear  Attempts: 1  Hemodynamics: stable

## 2021-12-20 NOTE — PROGRESS NOTES
[phone] Arrived to PACU, monitors placed and alarms on. Report received from [address] Awake and alert, denies any discomfort. Legs immobile. Sensation noted at right knee and left groin. 1350 Tolerating ice chips, denies any nausea. 1410 Sensation to ankle on right leg, sensation to mid thigh on left. Able to wiggle toes right foot and able to bend knee with some control. Spinal injection site inspection, no redness or swelling noted. 1430 Xray completed at bedside. 1445 Transport to [facility] room 4  1454 Report given to Haley Munoz RN at bedside.

## 2021-12-20 NOTE — CONSULTS
History and Physical      Name:  Gemma Snyder /Age/Sex: 1965  (60 y.o. female)   MRN & CSN:  [id number] & [id number] Admission Date/Time: 2021    Location:  [place] [address] PCP: Raymond Chang MD         History of present illness     Chief Complaint:   Had left knee replacement. Today and doing well at same day surgery. Gemma Snyder is a 60 y.o.  female medical history of primary osteoarthritis and  chest pain. Patient had left knee  arthroplastic robotic assisted  Replacement by Dr. Kathi Delgado today   Hospitalist medicine was consulted to manage patient Chronic medical conditions. Patient was seen and examined. She is resting comfortably in the bed denies any pain, headache, or abdominal pain. Left knee is being cooled  with ice pac.patient also has SCD  She tells me she is a vegetarian. Will  Appreciate  having some fruits, Family member will be bringing some food for her. She did have her right knee replaced in October this year. Assessment and Plan:     # Pain control    Managed by surgical team.    Patient states she only takes Celebrex for pain at home as needed. This was reordered.    Celebrex 200mg twice daily as needed    #  Iron supplement     Patient stated as a vegetarian she takes iron supplement     Iron 325 mg once daily     # PT/OT      When cleared by surgery     Medications:   Medications:    [START ON 2021] ferrous sulfate  325 mg Oral Daily with breakfast    sodium chloride flush  10 mL IntraVENous 2 times per day    acetaminophen  650 mg Oral Q6H    ceFAZolin (ANCEF) IVPB  2,000 mg IntraVENous Q8H    aspirin  325 mg Oral BID    docusate sodium  100 mg Oral Daily      Infusions:    lactated ringers 75 mL/hr at 21 1419    sodium chloride       PRN Meds: sodium chloride flush, 10 mL, PRN  sodium chloride, 25 mL, PRN  HYDROmorphone, 0.25 mg, Q3H PRN   Or  HYDROmorphone, 0.5 mg, Q3H PRN  bisacodyl, 5 mg, Daily PRN  oxyCODONE-acetaminophen, 1 tablet, Q4H PRN  oxyCODONE-acetaminophen, 2 tablet, Q4H PRN        Current Facility-Administered Medications:     [START ON 12/21/2021] ferrous sulfate (IRON 325) tablet 325 mg, 325 mg, Oral, Daily with breakfast, Pricila Muniz MD    lactated ringers infusion, , IntraVENous, Continuous, Pricila Muniz MD, Last Rate: 75 mL/hr at 12/20/21 1419, New Bag at 12/20/21 1419    sodium chloride flush 0.9 % injection 10 mL, 10 mL, IntraVENous, 2 times per day, Pricila Muniz MD    sodium chloride flush 0.9 % injection 10 mL, 10 mL, IntraVENous, PRN, Pricila Muniz MD    0.9 % sodium chloride infusion, 25 mL, IntraVENous, PRN, Pricila Muniz MD    acetaminophen (TYLENOL) tablet 650 mg, 650 mg, Oral, Q6H, Pricila Muniz MD    HYDROmorphone (DILAUDID) injection 0.25 mg, 0.25 mg, IntraVENous, Q3H PRN **OR** HYDROmorphone (DILAUDID) injection 0.5 mg, 0.5 mg, IntraVENous, Q3H PRN, Pricila Muniz MD    ceFAZolin (ANCEF) 2000 mg in dextrose 4 % 100 mL IVPB (premix), 2,000 mg, IntraVENous, Q8H, Pricila Muniz MD    bisacodyl (DULCOLAX) EC tablet 5 mg, 5 mg, Oral, Daily PRN, Pricila Muniz MD    aspirin EC tablet 325 mg, 325 mg, Oral, BID, Pricila Muniz MD    docusate sodium (COLACE) capsule 100 mg, 100 mg, Oral, Daily, Pricila Muniz MD    oxyCODONE-acetaminophen (PERCOCET) 5-325 MG per tablet 1 tablet, 1 tablet, Oral, Q4H PRN, Pricila Muniz MD    oxyCODONE-acetaminophen (PERCOCET) 5-325 MG per tablet 2 tablet, 2 tablet, Oral, Q4H PRN, Pricila Muniz MD       Review of Systems       GENERAL:  Denies fever, chills, night sweats, or changes in weight. EYES:  Denies recent visual changes. ENT:  Denies ear pain, hearing loss or tinnitus  RESP:  Denies any cough, dyspnea, or wheezing.   CV:  Denies any chest pain with exertion or at rest, palpitations,   GI:  Denies any dysphagia, nausea, vomiting, abdominal pain, heartburn, changes in bowel habit,   MUSCULOSKELETAL: left knee tenderness from surgery, limited left lower extremity range of  motion  , . NEURO:  Denies any headaches, tremors, dizziness, vertigo, memory loss, confusion,   PSYCH:  Denies any sleeping problem  HEME/LYMPHATIC/IMMUNO:  Denies , bruising,  ENDO:  Denies any heat or cold intolerance,      Objective: Intake/Output Summary (Last 24 hours) at 12/20/2021 1605  Last data filed at 12/20/2021 1432  Gross per 24 hour   Intake 1400 ml   Output 600 ml   Net 800 ml        Vitals:   Vitals:    12/20/21 1454   BP: 121/85   Pulse: 77   Resp: 16   Temp:    SpO2: 97%       No results found for this or any previous visit (from the past 24 hour(s)). Physical Exam:     GEN: Awake female,  Laying down in Bed   in no apparent distress. Appears given age. EYES: Pupils are equally round. No scleral erythema, discharge, or conjunctivitis. HENT: Mucous membranes are moist. External ears and nose appear normal.   NECK: Supple, no apparent thyromegaly or masses. RESP:  Symmetric chest movement while on room air. No Respiratory Distress. CARDIO/VASC:  S1 S2, regular rate and rhythm  No murmurs no lower extremity edema   GI:  active bowl sound, abdomen non tender non distended  Rectal exam deferred. : Yang catheter is not present. HEME/LYMPH: No lymphadenopathy or splenomegaly appreciated   MSK:Extremities:. Compression stockings in place. No signs of infection with  Left t knee surgery site. No gross joint deformities. SKIN: Normal coloration, warm, dry. NEURO: Cranial nerves appear grossly intact, normal speech, no lateralizing weakness. PSYCH: Awake, alert, oriented x 4. Affect appropriate. Past Medical History:      Past Medical History:   Diagnosis Date    Arthritis     Arthritis of knee, left      PSHX:  has a past surgical history that includes Total knee arthroplasty (Right, 10/6/2021) and joint replacement.     Allergies: No Known Allergies    FAM HX:  Mother had knee replacement   Soc HX:   Social History Socioeconomic History    Marital status:      Spouse name: Chase Nash Number of children: 2    Years of education: 12    Highest education level: None   Occupational History    None   Tobacco Use    Smoking status: Never Smoker    Smokeless tobacco: Never Used   Vaping Use    Vaping Use: Never used   Substance and Sexual Activity    Alcohol use: Yes     Comment: occasionally    Drug use: No    Sexual activity: Yes     Partners: Male   Other Topics Concern    None   Social History Narrative    None     Social Determinants of Health     Financial Resource Strain:     Difficulty of Paying Living Expenses: Not on file   Food Insecurity:     Worried About Running Out of Food in the Last Year: Not on file    Santos of Food in the Last Year: Not on file   Transportation Needs:     Lack of Transportation (Medical): Not on file    Lack of Transportation (Non-Medical):  Not on file   Physical Activity:     Days of Exercise per Week: Not on file    Minutes of Exercise per Session: Not on file   Stress:     Feeling of Stress : Not on file   Social Connections:     Frequency of Communication with Friends and Family: Not on file    Frequency of Social Gatherings with Friends and Family: Not on file    Attends [employer] Services: Not on file    Active Member of [address] [place] [address] or Organizations: Not on file    Attends Club or Organization Meetings: Not on file    Marital Status: Not on file   Intimate Partner Violence:     Fear of Current or Ex-Partner: Not on file    Emotionally Abused: Not on file    Physically Abused: Not on file    Sexually Abused: Not on file   Housing Stability:     Unable to Pay for Housing in the Last Year: Not on file    Number of [place] in the Last Year: Not on file    Unstable Housing in the Last Year: Not on file       Electronically signed by VICTORIANO Obando CNP on 12/20/2021 at 4:05 PM

## 2021-12-20 NOTE — PROGRESS NOTES
Physical Therapy    Facility/Department: [facility] SURGE OVERFLOW  Initial Assessment    NAME: Milli Aleman  : 1965  MRN: [id number]    Date of Service: 2021    Discharge Recommendations:  24 hour supervision or assist,Outpatient PT       Functional Outcome Measure:    Acute Care Index of Function (ACIF):    Score: 0.82 (0.71 or greater = appropriate for home with outpatient PT and 24 hour supervision/assist)      Assessment   Assessment: Pt is a 60 y.o. female with medical history, surgical history, co-morbidities, and personal factors including Arthritis of L knee and Total knee arthroplasty (Right, 10/6/2021) with admission for primary osteoarthritis of left knee and s/p L TKA. Prior to admission, pt was independent with functional mobility and ADLs. Prognosis: Good  Decision Making: Medium Complexity  Clinical Presentation: evolving  PT Education: Goals;PT Role;Plan of Care;Equipment; Functional Mobility Training;Transfer Training;Gait Training;General Safety; Adaptive Device Training;Weight-bearing Education  REQUIRES PT FOLLOW UP: Yes  Activity Tolerance  Activity Tolerance: Patient Tolerated treatment well         Restrictions  Restrictions/Precautions  Restrictions/Precautions: General Precautions,Fall Risk,Weight Bearing  Lower Extremity Weight Bearing Restrictions  Left Lower Extremity Weight Bearing: Weight Bearing As Tolerated     Vision/Hearing  Vision: Within Functional Limits  Hearing: Within functional limits     Subjective  General  Chart Reviewed: Yes  Patient assessed for rehabilitation services?: Yes  Family / Caregiver Present: No  Follows Commands: Within Functional Limits  Pain Screening  Patient Currently in Pain: Denies  Vital Signs  Patient Currently in Pain: Denies       Orientation  Orientation  Overall Orientation Status: Within Normal Limits   Social/Functional History  Social/Functional History  Lives With: Spouse  Type of Home: House  Home Layout: One level  Home Access: Stairs to enter with rails  Entrance Stairs - Number of Steps: 3  Bathroom Shower/Tub: Tub/Shower unit  ADL Assistance: Independent  Homemaking Assistance: Independent  Ambulation Assistance: Independent  Transfer Assistance: Independent     Cognition   Cognition  Overall Cognitive Status: WNL    Objective             Strength RLE  Strength RLE: WFL  Strength LLE  Comment: knee extension: at least 3+/5 observed functionally     Sensation  Overall Sensation Status: WNL  Bed mobility  Rolling to Left: Independent  Rolling to Right: Independent  Supine to Sit: Independent  Sit to Supine: Independent  Transfers  Sit to Stand: Independent  Stand to sit: Independent  Ambulation  Ambulation?: Yes  Ambulation 1  Surface: level tile  Device: Rolling [place]  Assistance: Stand by assistance;Supervision  Quality of Gait: decreased gait speed, decreased step length bilaterally, decreased stance time on LLE, decreased heel strike on LLE, antalgic  Distance: 150 feet + 150 feet  Comments: with initial verbal cues for BLE placement, walker placement, and sequence throughout ambulation; with initial verbal cues for directions in order to successfully navigate hallway and return to correct room     Balance  Posture: Fair  Sitting - Static: Good  Sitting - Dynamic: Good  Standing - Static: Good  Standing - Dynamic: Good (with a front wheeled walker)        Gait Training:  Cues were given for safety, sequence, device management, balance, posture, awareness, path.           Plan   Plan  Times per week: 7 BID  Current Treatment Recommendations: Strengthening,ROM,Balance Training,Functional Mobility Training,Transfer Training,ADL/Self-care Training,Gait Training,Patient/Caregiver Education & Training,IADL Training,Stair training,Equipment Evaluation, Education, & procurement,Pain Management,Home Exercise Program,Positioning,Endurance Training,Safety Education & Training,Neuromuscular Re-education  Safety Devices  Type of devices: All fall risk precautions in place,Left in chair,Chair alarm in place,Call light within reach,Nurse notified,Gait belt,Patient at risk for falls      AM-PAC Score  AM-PAC Inpatient Mobility Raw Score : 22 (12/20/21 1816)  AM-PAC Inpatient T-Scale Score : 53.28 (12/20/21 1816)  Mobility Inpatient CMS 0-100% Score: 20.91 (12/20/21 1816)  Mobility Inpatient CMS G-Code Modifier : CJ (12/20/21 1816)          Goals  Long term goals  Time Frame for Long term goals :  In one week:  Long term goal 1: Pt will ambulate 500 feet with modified independence with LRAD  Long term goal 2: Pt will independently ascend/descend 6 steps with a handrail  Long term goal 3: Pt will independently complete 3 sets of 10 reps of BLE AROM exercises in available and allowed ROM       Time In: 1700  Time Out: 1740  Total Treatment Time: 40  Timed Code Treatment Minutes: [address] Sami Mcgarry, PT, DPT  License #: [id number]

## 2021-12-21 VITALS
RESPIRATION RATE: 18 BRPM | OXYGEN SATURATION: 100 % | HEART RATE: 78 BPM | SYSTOLIC BLOOD PRESSURE: 119 MMHG | BODY MASS INDEX: 29.64 KG/M2 | TEMPERATURE: 96.9 F | HEIGHT: 61 IN | DIASTOLIC BLOOD PRESSURE: 77 MMHG | WEIGHT: 157 LBS

## 2021-12-21 PROBLEM — Z96.652 S/P TOTAL KNEE REPLACEMENT, LEFT: Status: ACTIVE | Noted: 2021-12-21

## 2021-12-21 PROBLEM — M17.32 POST-TRAUMATIC OSTEOARTHRITIS OF LEFT KNEE: Status: RESOLVED | Noted: 2021-12-20 | Resolved: 2021-12-21

## 2021-12-21 PROCEDURE — 6360000002 HC RX W HCPCS: Performed by: ORTHOPAEDIC SURGERY

## 2021-12-21 PROCEDURE — G0378 HOSPITAL OBSERVATION PER HR: HCPCS

## 2021-12-21 PROCEDURE — 6370000000 HC RX 637 (ALT 250 FOR IP): Performed by: ORTHOPAEDIC SURGERY

## 2021-12-21 PROCEDURE — 2580000003 HC RX 258: Performed by: ORTHOPAEDIC SURGERY

## 2021-12-21 PROCEDURE — 97530 THERAPEUTIC ACTIVITIES: CPT

## 2021-12-21 PROCEDURE — 97110 THERAPEUTIC EXERCISES: CPT

## 2021-12-21 PROCEDURE — 97116 GAIT TRAINING THERAPY: CPT

## 2021-12-21 RX ORDER — OXYCODONE HYDROCHLORIDE AND ACETAMINOPHEN 5; 325 MG/1; MG/1
1 TABLET ORAL EVERY 6 HOURS PRN
Qty: 28 TABLET | Refills: 0 | Status: SHIPPED | OUTPATIENT
Start: 2021-12-21 | End: 2021-12-28

## 2021-12-21 RX ORDER — DOCUSATE SODIUM 100 MG/1
100 CAPSULE, LIQUID FILLED ORAL 2 TIMES DAILY PRN
Qty: 30 CAPSULE | Refills: 1 | Status: SHIPPED | OUTPATIENT
Start: 2021-12-21

## 2021-12-21 RX ADMIN — SODIUM CHLORIDE, POTASSIUM CHLORIDE, SODIUM LACTATE AND CALCIUM CHLORIDE: 600; 310; 30; 20 INJECTION, SOLUTION INTRAVENOUS at 03:39

## 2021-12-21 RX ADMIN — OXYCODONE AND ACETAMINOPHEN 1 TABLET: 5; 325 TABLET ORAL at 03:15

## 2021-12-21 RX ADMIN — CEFAZOLIN SODIUM 2000 MG: 2 INJECTION, SOLUTION INTRAVENOUS at 03:19

## 2021-12-21 ASSESSMENT — PAIN SCALES - GENERAL
PAINLEVEL_OUTOF10: 4
PAINLEVEL_OUTOF10: 3
PAINLEVEL_OUTOF10: 3

## 2021-12-21 ASSESSMENT — PAIN DESCRIPTION - DESCRIPTORS
DESCRIPTORS: DISCOMFORT
DESCRIPTORS: ACHING;DISCOMFORT

## 2021-12-21 ASSESSMENT — PAIN DESCRIPTION - PROGRESSION
CLINICAL_PROGRESSION: GRADUALLY IMPROVING
CLINICAL_PROGRESSION: GRADUALLY IMPROVING

## 2021-12-21 ASSESSMENT — PAIN DESCRIPTION - PAIN TYPE
TYPE: SURGICAL PAIN
TYPE: SURGICAL PAIN

## 2021-12-21 ASSESSMENT — PAIN DESCRIPTION - ORIENTATION
ORIENTATION: LEFT
ORIENTATION: LEFT

## 2021-12-21 ASSESSMENT — PAIN DESCRIPTION - LOCATION
LOCATION: KNEE
LOCATION: KNEE

## 2021-12-21 ASSESSMENT — PAIN DESCRIPTION - ONSET
ONSET: ON-GOING
ONSET: ON-GOING

## 2021-12-21 ASSESSMENT — PAIN DESCRIPTION - FREQUENCY
FREQUENCY: CONTINUOUS
FREQUENCY: CONTINUOUS

## 2021-12-21 NOTE — PROGRESS NOTES
9906 I went over discharge instructions with patient and . I removed IV. Patient is getting dressed at bedside.  is pulling car around.

## 2021-12-21 NOTE — PROGRESS NOTES
Doing well postoperatively. Pain controlled  Did well with physical therapy    Objective:   Patient Vitals for the past 4 hrs:   BP Temp Temp src Pulse Resp SpO2   12/21/21 0640 117/70 96.3 °F (35.7 °C) Temporal 77 18 100 %   12/21/21 0400 113/70   70 16 95 %         Physical Exam:     The patient is awake and alert  Resting comfortably in bed    Operative extremity:    The dressing is clean dry and intact. Incision is intact with Dermabond dressing. No erythema, drainage, or induration. Calf is soft and nontender. Sensation and motor function intact distally      LABS   CBC: No results for input(s): WBC, HGB, PLT in the last 72 hours. Postoperative x-rays show well aligned prosthesis with no complications. Assessment and Plan     1. POD # 1 total knee replacement    1:  Physical therapy consult for mobilization   -Weight-bear as tolerated, progress as tolerated  2:  DVT prophylaxis   -Aspirin twice a day for 2 weeks  3:  Continue Pain Control   -Oral medications as needed, IV medication only for breakthrough pain  4. Medical management per hospitalist service  5:  D/C Planning:    -Discharge to home later today if patient is mobilizing well with physical therapy and pain is well controlled. -Discontinue Yang this morning  -Follow-up in 2 weeks for x-rays and wound check.          Mariposa Dumont MD

## 2021-12-21 NOTE — DISCHARGE SUMMARY
DISCHARGE SUMMARY:  Bradley Neal MD, MD     Date of Admission:      12/20/2021  Date of Discharge:    12/21/2021     Admission Diagnosis   Primary osteoarthritis of left knee [M17.12]  Post-traumatic osteoarthritis of left knee [M17.32]   Discharge Diagnosis   Same    Procedure:    Left Total knee replacement 12/20/2021    Consultations:  IP CONSULT TO HOSPITALIST  IP CONSULT TO CASE MANAGEMENT  IP CONSULT TO HOME CARE NEEDS    Brief history and hospital stay. Milli Aleman is a 60 y.o. female who underwent total knee replacement procedure without complication. Milli Aleman was admitted to the floor following surgery. Appropriate consults were obtained as outlined in progress notes. The patients diet was advanced as tolerated. The patient remained hemodynamically stable and neurovascularly intact in the lower extremity throughout the hospital course. On the day of discharge the patient's calf remained soft and showed no evidence of DVT. Physical therapy and occupational therapy were consulted. The patient progressed well with PT/OT throughout the stay. DVT prophylaxis was initiated and will continue for 2 weeks. The patients pain was controlled initially with IV pain medications and then was transitioned to oral pain medications prior to discharge    The patient was discharged to Home and will continue to follow the precautions outlined to them by us and PT/OT. Condition on Discharge: Stable    Medications       Medication List      START taking these medications    aspirin 325 MG EC tablet  Take 1 tablet by mouth 2 times daily     docusate sodium 100 MG capsule  Commonly known as: COLACE  Take 1 capsule by mouth 2 times daily as needed for Constipation     oxyCODONE-acetaminophen 5-325 MG per tablet  Commonly known as: Percocet  Take 1 tablet by mouth every 6 hours as needed for Pain for up to 7 days. Intended supply: 7 days.  Take lowest dose possible to manage pain        CONTINUE taking these medications    calcium carbonate 1250 (500 Ca) MG tablet  Commonly known as: OYSTER SHELL CALCIUM 500 mg     celecoxib 200 MG capsule  Commonly known as: CeleBREX  Take 1 capsule by mouth 2 times daily     ferrous sulfate 325 (65 Fe) MG tablet  Commonly known as: IRON 325     Vitamin D3 1.25 MG (28773 UT) Caps           Where to Get Your Medications      These medications were sent to [address] [address] [facility]    Phone: [phone]   · aspirin 325 MG EC tablet  · docusate sodium 100 MG capsule  · oxyCODONE-acetaminophen 5-325 MG per tablet          Plan  Discharge instructions were given along with prescriptions for pain medications as well as DVT prophylaxis. Plan is to follow up in 2-3 weeks for a wound check and x-rays. Patient was instructed on the use of pain medications, the signs and symptoms of infection or blood clot, and was given our number to call should they have any questions or concerns following discharge.     Bradley Neal MD, MD

## 2021-12-21 NOTE — PROGRESS NOTES
Donna QUINN reviewed pt discharge instructions    Patient discharged to car via wheelchair, with tech, per patient  picked up Rx at outpatient pharmacy, home with .

## 2021-12-21 NOTE — PROGRESS NOTES
Physical Therapy    Physical Therapy Treatment Note  Name: Pietro Chavez MRN: [id number] :   1965   Date:  2021   Admission Date: 2021 Room:  [place] [address]   Restrictions/Precautions:  Restrictions/Precautions  Restrictions/Precautions: General Precautions,Fall Risk,Weight Bearing Lower Extremity Weight Bearing Restrictions  Left Lower Extremity Weight Bearing: Weight Bearing As Tolerated     Communication with other providers:  per nurse pt is ok to treat  Subjective:  Patient states: she is ready to go for a walk  Pain:   Location, Type, Intensity (0/10 to 10/10):  soreness  Objective:    Observation:  Pt was in bed  Treatment, including education/measures:  Pt given a bath sponge for home  AROM of L knee in sitting 0-92 degrees  Sitting Exercises: Ankle pumps x 15  Glute sets x 10  LAQ's x 10  Marching x 10   Therapeutic Exercise:  Therapeutic exercises were instructed today. Cues were given for technique, safety, recruitment, and rationale. Cues were verbal and/or tactile. Transfers  Supine to sit : Ind  Sit to supine :Ind  Scooting :Ind  Sit to stand : Ind from bed and toilet  Stand to sit : Ind to chair and toilet  Gait:  Pt amb with RW for 300 ft with SBA  Pt needed VC's for pathway  Pt amb up and down 3 steps with 1 HR and CGA using step to pattern and side stepping  Safety  Patient left safely in the chair, with call light/phone in reach. Gait belt and mask were used for transfers and gait.   Assessment / Impression:     Patient's tolerance of treatment:  good   Adverse Reaction: none  Significant change in status and impact:  none  Barriers to improvement:  none  Plan for Next Session:    Will cont to work towards pt's goals per her tolerance  Time in:  830  Time out:  908  Timed treatment minutes:  38  Total treatment time:  45  Previously filed items:  Social/Functional History  Lives With: Spouse  Type of Home: House  Home Layout: One level  Home Access: Stairs to enter with rails  Entrance Stairs - Number of Steps: 3  Bathroom Shower/Tub: Tub/Shower unit  ADL Assistance: Independent  Homemaking Assistance: Independent  Ambulation Assistance: Independent  Transfer Assistance: Independent     Long term goals  Time Frame for Long term goals : In one week:  Long term goal 1: Pt will ambulate 500 feet with modified independence with LRAD  Long term goal 2: Pt will independently ascend/descend 6 steps with a handrail  Long term goal 3: Pt will independently complete 3 sets of 10 reps of BLE AROM exercises in available and allowed ROM    Electronically signed by:     Tita Pruitt PTA  12/21/2021, 9:02 AM

## 2021-12-26 NOTE — ANESTHESIA POSTPROCEDURE EVALUATION
Department of Anesthesiology  Postprocedure Note    Patient: Jermaine Jasso  MRN: [id number]  YOB: 1965  Date of evaluation: 12/26/2021  Time:  7:29 AM     Procedure Summary     Date: 12/20/21 Room / Location: [address]    Anesthesia Start: 1884 Anesthesia Stop: 7393    Procedure: LEFT KNEE TOTAL ARTHROPLASTY ROBOTIC (Left Knee) Diagnosis:       Primary osteoarthritis of left knee      (Primary osteoarthritis of left knee [M17.12])    Surgeons: Luana Del Valle MD Responsible Provider: VICTORIANO Rangel CRNA    Anesthesia Type: MAC, regional, spinal ASA Status: 2          Anesthesia Type: MAC, regional, spinal    Isabel Phase I: Isabel Score: 8    Isabel Phase II: Isabel Score: 9    Last vitals: Reviewed and per EMR flowsheets.        Anesthesia Post Evaluation    Patient location during evaluation: PACU  Patient participation: complete - patient participated  Level of consciousness: awake and alert  Pain score: 0  Airway patency: patent  Nausea & Vomiting: no nausea and no vomiting  Complications: no  Cardiovascular status: blood pressure returned to baseline  Respiratory status: acceptable  Hydration status: euvolemic

## 2021-12-28 ENCOUNTER — HOSPITAL ENCOUNTER (OUTPATIENT)
Dept: PHYSICAL THERAPY | Age: 56
Setting detail: THERAPIES SERIES
Discharge: HOME | End: 2021-12-28
Payer: COMMERCIAL

## 2021-12-28 PROCEDURE — 97110 THERAPEUTIC EXERCISES: CPT

## 2021-12-28 PROCEDURE — 97016 VASOPNEUMATIC DEVICE THERAPY: CPT

## 2021-12-28 PROCEDURE — 97161 PT EVAL LOW COMPLEX 20 MIN: CPT

## 2021-12-28 ASSESSMENT — PAIN DESCRIPTION - FREQUENCY: FREQUENCY: CONTINUOUS

## 2021-12-28 ASSESSMENT — PAIN DESCRIPTION - LOCATION: LOCATION: KNEE

## 2021-12-28 ASSESSMENT — PAIN DESCRIPTION - ONSET: ONSET: PROGRESSIVE

## 2021-12-28 ASSESSMENT — PAIN DESCRIPTION - PROGRESSION: CLINICAL_PROGRESSION: GRADUALLY IMPROVING

## 2021-12-28 ASSESSMENT — PAIN SCALES - GENERAL: PAINLEVEL_OUTOF10: 0

## 2021-12-28 ASSESSMENT — PAIN DESCRIPTION - PAIN TYPE: TYPE: SURGICAL PAIN

## 2021-12-28 ASSESSMENT — PAIN DESCRIPTION - ORIENTATION: ORIENTATION: LEFT

## 2021-12-28 ASSESSMENT — PAIN DESCRIPTION - DESCRIPTORS: DESCRIPTORS: ACHING

## 2021-12-28 NOTE — PROGRESS NOTES
Physical Therapy  Initial Assessment  Date: 2021  Patient Name: Tea Ojeda  MRN: [id number]  : 1965     Treatment Diagnosis: L knee pain, stiffness, weakness, altered gait    Restrictions  Position Activity Restriction  Other position/activity restrictions: postop L TKA    Subjective   General  Chart Reviewed: Yes  Patient assessed for rehabilitation services?: Yes  Additional Pertinent Hx: R TKA done 10/6/21, L TKA 21  Referring Practitioner: Ron Ramírez  Diagnosis: L TKA  PT Visit Information  PT Insurance Information: Medical [place] 20 PCY   12 used  Subjective  Subjective: stayed one night again in hosp, no issues so far. Sleep is not bad but is waking a lot d/t pain likes to sleep on side. Pain Screening  Patient Currently in Pain: Yes  Pain Assessment  Pain Assessment: 0-10  Pain Level: 0 (jsut stiff and achy not really pain)  Patient's Stated Pain Goal: No pain  Pain Type: Surgical pain  Pain Location: Knee  Pain Orientation: Left  Pain Descriptors: Aching  Pain Frequency: Continuous  Pain Onset: Progressive  Clinical Progression: Gradually improving  Vital Signs  Patient Currently in Pain: Yes    Vision/Hearing       Orientation  Orientation  Overall Orientation Status: Within Normal Limits    Social/Functional History  Social/Functional History  Lives With: Family  Type of Home: House  Home Layout: Two level  Home Access: Stairs to enter without rails  Entrance Stairs - Number of Steps: 2 steps uses wall and does use walker some  Home Equipment: Rolling walker;Cane  Receives Help From: Family  Occupation: Full time employment  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     Objective     Observation/Palpation  Palpation: min TTP noted this date  Observation: ambualtion on RW w/ good WB and non antalgic gait  Edema: moderate    AROM LLE (degrees)  LLE General AROM: knee 0-70    Strength LLE  Comment: quad tone is fair w/ min patellar excursion. Needs A for SLR, ankle WNL     Additional Measures  Special Tests: NT d/t postop     Assessment   Conditions Requiring Skilled Therapeutic Intervention  Body structures, Functions, Activity limitations: Decreased functional mobility ; Decreased ADL status; Decreased ROM; Decreased strength;Decreased high-level IADLs; Increased pain  Assessment: Pt is 59 yo female who presents just 8 days postop L TKA. She had R TKA 10/6/21. She ambulates w/ RW w/ good WB and mild limit to knee flex/ext L. She has min to mod pain w/ limited AROM and strength in L LE. She will benefit from PT to help restore these deficits so she can return to normal sleep, gait and functional activity w/o pain. Prior to 2021 she was walking w/o device w/o pain and on feet all day w/ min pain. Patient agrees with established plan of care and assisted in the development of their short term and long term goals. Patient had no adverse reaction with initial treatment and there are no barriers to learning. Demonstrates no mental or cognitive disorder.     Treatment Diagnosis: L knee pain, stiffness, weakness, altered gait  Prognosis: Good  Decision Making: Low Complexity  Barriers to Learning: none  REQUIRES PT FOLLOW UP: Yes  Treatment Initiated : see flow sheet         Plan   Plan  Times per week: 2x  Plan weeks: 6weeks  Specific instructions for Next Treatment: progress ROM, strength, gait, balance to yanni, pain and edema control  Current Treatment Recommendations: Strengthening,ROM,Functional Mobility Training,Pain Management,Modalities,Manual Therapy - Joint Manipulation,Manual Therapy - Soft Tissue Mobilization,Neuromuscular Re-education,Home Exercise Program,Patient/Caregiver Education & Training       OutComes Score     KOOS 54%     Goals  Short term goals  Time Frame for Short term goals: 3 weeks  Short term goal 1: Pt will be able to report min pain w/ usual activity  Short term goal 2: Pt will be able to walk w/ SPC or less at least 50% of the time  Short term goal 3: Pt will be able to achieve at least 100° flex for ease of gait and stairs  Long term goals  Time Frame for Long term goals : 6 weeks  Long term goal 1: Pt will have improved KOOS score by 20% or more  Long term goal 2: Pt will be able to walk w/o device at least 75% of the time w/o knee pain  Long term goal 3: Pt will be able to go up/down stairs reciprocally w/o pain or limit  Patient Goals   Patient goals : walking and running w/o pain/limit, no device, get it bending again.         Aleta Phalen, PT  PT, MPT, ATC     12/28/2021, 2:28 PM

## 2021-12-28 NOTE — FLOWSHEET NOTE
Outpatient Physical Therapy  [place]           [x] Phone: [phone]   Fax: [phone]  [facility]           [] Phone: [phone]   Fax: [phone]        Physical Therapy Daily Treatment Note  Date:  2021    Patient Name:  Elijah Chapman    :  1965  MRN: [id number]  Restrictions/Precautions: Other position/activity restrictions: postop L TKA  Diagnosis:   Diagnosis: L TKA  Date of Injury/Surgery:  21   (R TKA 10/6/21)  Treatment Diagnosis: Treatment Diagnosis: L knee pain, stiffness, weakness, altered gait    Insurance/Certification information: PT Insurance Information: Medical [place] 20 PCY   12 used   Referring Physician:  Referring Practitioner: Nan Bob Doctor Visit:    Plan of care signed (Y/N):  Pending cosign   Outcome Measure: KOOS 54%  Visit# / total visits:    POC  Pain level: 0/10   Goals:     Patient goals : walking and running w/o pain/limit, no device, get it bending again. Short term goals  Time Frame for Short term goals: 3 weeks  Short term goal 1: Pt will be able to report min pain w/ usual activity  Short term goal 2: Pt will be able to walk w/ SPC or less at least 50% of the time  Short term goal 3: Pt will be able to achieve at least 100° flex for ease of gait and stairs  Long term goals  Time Frame for Long term goals : 6 weeks  Long term goal 1: Pt will have improved KOOS score by 20% or more  Long term goal 2: Pt will be able to walk w/o device at least 75% of the time w/o knee pain  Long term goal 3: Pt will be able to go up/down stairs reciprocally w/o pain or limit    Summary of Evaluation: Assessment: Pt is 59 yo female who presents just 8 days postop L TKA. She had R TKA 10/6/21. She ambulates w/ RW w/ good WB and mild limit to knee flex/ext L. She has min to mod pain w/ limited AROM and strength in L LE. She will benefit from PT to help restore these deficits so she can return to normal sleep, gait and functional activity w/o pain. Prior to 2021 she was walking w/o device w/o pain and on feet all day w/ min pain. Subjective:  See dev         Any changes in Ambulatory Summary Sheet? None        Objective:  See dev   COVID screening questions were asked and patient attested that there had been no contact or symptoms        Exercises: (No more than 4 columns)   Exercise/Equipment 12/28/21  #1 Date Date           WARM UP       Nustep  Lev 1, S8 5', after some ROM this date           TABLE      Quad set  10x5\"     SLR  Min A 10x2     SAQ  10x2, min A     LAQ 10x     Seated flex  10x                           STANDING      Gait  -- Cane? ?                                             [address]  10'                Other Therapeutic Activities/Education:  12/28/2021: Patient received education on their current pathology and how their condition effects them with their functional activities. Patient understood discussion and questions were answered. Patient understands their activity limitations and understands rational for treatment progression. Home Exercise Program:  Continue current from prior hand outs      Manual Treatments:  PROM to yanni flex       Modalities:  Vaso to L knee, pt recumb w/ leg on pillow, 10'      Communication with other providers:  POC set for cosign 12/28/21      Assessment:  Pt is 59 yo female who presents just 8 days postop L TKA. She had R TKA 10/6/21. She ambulates w/ RW w/ good WB and mild limit to knee flex/ext L. She has min to mod pain w/ limited AROM and strength in L LE. She will benefit from PT to help restore these deficits so she can return to normal sleep, gait and functional activity w/o pain. Prior to 2021 she was walking w/o device w/o pain and on feet all day w/ min pain.       Plan for Next Session:  progress ROM, strength, gait, balance to yanni, pain and edema control      Time In / Time Out:    1330/1415       Timed Code/Total Treatment Minutes:  15/45  1 TE 15', 1 Vaso 10', 1 Eval 20'      Next Progress Note due:  30 days     Plan of Care Interventions:  [x] Therapeutic Exercise  [x] Modalities:  [x] Therapeutic Activity     [] Ultrasound  [x] Estim  [x] Gait Training      [] Cervical Traction [] Lumbar Traction  [x] Neuromuscular Re-education    [x] Cold/hotpack [] Iontophoresis   [x] Instruction in HEP      [x] Vasopneumatic   [] Dry Needling    [x] Manual Therapy               [] Aquatic Therapy              Electronically signed by:  Francesca Muir PT,   PT, MPT, ATC  12/28/2021, 2:29 PM    12/28/2021, 2:31 PM

## 2021-12-28 NOTE — PLAN OF CARE
Outpatient Physical Therapy           [place]           [x] Phone: [phone]   Fax: [phone]  [place]           [] Phone: [phone]   Fax: [phone]     To: Referring Practitioner: Eliane Mcdonald    From: Judie Bender PT, PT     Patient: Pastora Cancino       : 1965  Diagnosis: Diagnosis: L TKA   Treatment Diagnosis: Treatment Diagnosis: L knee pain, stiffness, weakness, altered gait   Date: 2021    Physical Therapy Certification/Re-Certification Form  Dear Dr. Eliane Mcdonald,   The following patient has been evaluated for physical therapy services and for therapy to continue, insurance requires physician review of the treatment plan initially and every 90 days. Please review the attached evaluation and/or summary of the patient's plan of care, and verify that you agree therapy should continue by signing the attached document and sending it back to our office. Assessment:    Assessment: Pt is 61 yo female who presents just 8 days postop L TKA. She had R TKA 10/6/21. She ambulates w/ RW w/ good WB and mild limit to knee flex/ext L. She has min to mod pain w/ limited AROM and strength in L LE. She will benefit from PT to help restore these deficits so she can return to normal sleep, gait and functional activity w/o pain. Prior to  she was walking w/o device w/o pain and on feet all day w/ min pain. Patient agrees with established plan of care and assisted in the development of their short term and long term goals. Patient had no adverse reaction with initial treatment and there are no barriers to learning. Demonstrates no mental or cognitive disorder.       Plan of Care/Treatment to date:  [x] Therapeutic Exercise  [x] Modalities:  [x] Therapeutic Activity     [] Ultrasound  [x] Electrical Stimulation  [x] Gait Training      [] Cervical Traction [] Lumbar Traction  [x] Neuromuscular Re-education    [x] Cold/hotpack [] Iontophoresis   [x] Instruction in HEP      [x] Vasopneumatic    [] Dry Needling  [x] Manual Therapy               [] Aquatic Therapy       Other:          Frequency/Duration:  # Days per week: [] 1 day # Weeks: [] 1 week [] 5 weeks     [x] 2 days   [] 2 weeks [x] 6 weeks     [] 3 days   [] 3 weeks [] 7 weeks     [] 4 days   [] 4 weeks [] 8 weeks         [] 9 weeks [] 10 weeks         [] 11 weeks [] 12 weeks    Rehab Potential/Progress: [x] Excellent [x] Good [] Fair  [] Poor     Goals:    Patient goals : walking and running w/o pain/limit, no device, get it bending again. Short term goals  Time Frame for Short term goals: 3 weeks  Short term goal 1: Pt will be able to report min pain w/ usual activity  Short term goal 2: Pt will be able to walk w/ SPC or less at least 50% of the time  Short term goal 3: Pt will be able to achieve at least 100° flex for ease of gait and stairs  Long term goals  Time Frame for Long term goals : 6 weeks  Long term goal 1: Pt will have improved KOOS score by 20% or more  Long term goal 2: Pt will be able to walk w/o device at least 75% of the time w/o knee pain  Long term goal 3: Pt will be able to go up/down stairs reciprocally w/o pain or limit      Electronically signed by:  Laxmi Chadwick, PT, PT, MPT, ATC  12/28/2021, 2:31 PM    12/28/2021, 2:31 PM  If you have any questions or concerns, please don't hesitate to call.   Thank you for your referral.      Physician Signature:________________________________Date:_________ TIME: _____  By signing above, therapists plan is approved by physician

## 2021-12-30 ENCOUNTER — HOSPITAL ENCOUNTER (OUTPATIENT)
Dept: PHYSICAL THERAPY | Age: 56
Discharge: HOME | End: 2021-12-30

## 2022-01-03 ENCOUNTER — HOSPITAL ENCOUNTER (OUTPATIENT)
Dept: PHYSICAL THERAPY | Age: 57
Setting detail: THERAPIES SERIES
Discharge: HOME | End: 2022-01-03
Payer: COMMERCIAL

## 2022-01-03 PROCEDURE — 97110 THERAPEUTIC EXERCISES: CPT

## 2022-01-03 PROCEDURE — 97016 VASOPNEUMATIC DEVICE THERAPY: CPT

## 2022-01-03 NOTE — FLOWSHEET NOTE
Outpatient Physical Therapy  [place]           [x] Phone: [phone]   Fax: [phone]  [facility]           [] Phone: [phone]   Fax: [phone]        Physical Therapy Daily Treatment Note  Date:  1/3/2022    Patient Name:  Sherwin Flynn    :  1965  MRN: [id number]  Restrictions/Precautions: Other position/activity restrictions: postop L TKA  Diagnosis:   Diagnosis: L TKA  Date of Injury/Surgery:  21   (R TKA 10/6/21)  Treatment Diagnosis: Treatment Diagnosis: L knee pain, stiffness, weakness, altered gait    Insurance/Certification information: PT Insurance Information: Medical [place] 20 PCY   12 used   Referring Physician:  Referring Practitioner: Rudolph Garsia  Next Doctor Visit:    Plan of care signed (Y/N):  yes  Outcome Measure: KOOS 54%  Visit# / total visits:    POC  Pain level: 0/10   Goals:     Patient goals : walking and running w/o pain/limit, no device, get it bending again. Short term goals  Time Frame for Short term goals: 3 weeks  Short term goal 1: Pt will be able to report min pain w/ usual activity  Short term goal 2: Pt will be able to walk w/ SPC or less at least 50% of the time  Short term goal 3: Pt will be able to achieve at least 100° flex for ease of gait and stairs  Long term goals  Time Frame for Long term goals : 6 weeks  Long term goal 1: Pt will have improved KOOS score by 20% or more  Long term goal 2: Pt will be able to walk w/o device at least 75% of the time w/o knee pain  Long term goal 3: Pt will be able to go up/down stairs reciprocally w/o pain or limit    Summary of Evaluation: Assessment: Pt is 61 yo female who presents just 8 days postop L TKA. She had R TKA 10/6/21. She ambulates w/ RW w/ good WB and mild limit to knee flex/ext L. She has min to mod pain w/ limited AROM and strength in L LE. She will benefit from PT to help restore these deficits so she can return to normal sleep, gait and functional activity w/o pain.   Prior to  she was walking w/o device w/o pain and on feet all day w/ min pain. Subjective:   Feeling pretty good today, really min pain overall and moderate stiffness. Any changes in Ambulatory Summary Sheet? None        Objective:  COVID screening questions were asked and patient attested that there had been no contact or symptoms    Using RW but not WB thru it at all w/ good stability. Pt does very well w/ SPC. Pt has good quad tone. Full ext w/ SAQ. AROM flex 109°. Exercises: (No more than 4 columns)   Exercise/Equipment 12/28/21  #1 1/3/22 #2 Date           WARM UP       Nustep  Lev 1, S8 5', after some ROM this date S8, Lev 3, 5'           TABLE      Quad set  10x5\" 20x5\"    SLR  Min A 10x2 10x3     SAQ  10x2, min A 20x3\"    LAQ 10x 10x3\"    Seated flex  10x  10x3\"    Hip abd   10x2     Heel slide   20x w/ strap     Bridge   10x2                          STANDING      Gait  -- SPCane 100 ft x3    Step up ant   4\" 10x2    Heel raise   20x  FR                                PROPRIOCEPTION      Wobble board balance   30\"x2 ea way                            MODALITIES      Vaso  10'                Other Therapeutic Activities/Education:  12/28/2021: Patient received education on their current pathology and how their condition effects them with their functional activities. Patient understood discussion and questions were answered. Patient understands their activity limitations and understands rational for treatment progression. Home Exercise Program:  Continue current from prior hand outs      Manual Treatments:  PROM to yanni flex       Modalities:  Vaso to L knee, pt recumb w/ leg on pillow, 10'      Communication with other providers:  POC set for cosign 12/28/21      Assessment:   Pt tolerated Rx very well and is making excellent progress.   She has ROM and strength deficits in L LE from recent TKA and will benefit from skilled therapy interventions to address remaining impairments, improve mobility and strength and progress toward goal completion while reducing risk for re-injury or further decline. No pain after Rx.         Plan for Next Session:  progress ROM, strength, gait, balance to yanni, pain and edema control      Time In / Time Out:   1800/1840         Timed Code/Total Treatment Minutes:   30/40  2 TE 30', 1 Vaso 10'     Next Progress Note due:  30 days     Plan of Care Interventions:  [x] Therapeutic Exercise  [x] Modalities:  [x] Therapeutic Activity     [] Ultrasound  [x] Estim  [x] Gait Training      [] Cervical Traction [] Lumbar Traction  [x] Neuromuscular Re-education    [x] Cold/hotpack [] Iontophoresis   [x] Instruction in HEP      [x] Vasopneumatic   [] Dry Needling    [x] Manual Therapy               [] Aquatic Therapy              Electronically signed by:  Judah Steward PT,   PT, MPT, ATC  1/3/2022, 10:24 AM      1/3/2022, 6:48 PM

## 2022-01-05 ENCOUNTER — HOSPITAL ENCOUNTER (OUTPATIENT)
Dept: PHYSICAL THERAPY | Age: 57
Setting detail: THERAPIES SERIES
Discharge: HOME | End: 2022-01-05
Payer: COMMERCIAL

## 2022-01-05 PROCEDURE — 97110 THERAPEUTIC EXERCISES: CPT

## 2022-01-05 PROCEDURE — 97016 VASOPNEUMATIC DEVICE THERAPY: CPT

## 2022-01-05 NOTE — FLOWSHEET NOTE
Outpatient Physical Therapy  [place]           [x] Phone: [phone]   Fax: [phone]  [facility]           [] Phone: [phone]   Fax: [phone]        Physical Therapy Daily Treatment Note  Date:  2022    Patient Name:  Miranda Jj    :  1965  MRN: [id number]  Restrictions/Precautions: Other position/activity restrictions: postop L TKA  Diagnosis:   Diagnosis: L TKA  Date of Injury/Surgery:  21   (R TKA 10/6/21)  Treatment Diagnosis: Treatment Diagnosis: L knee pain, stiffness, weakness, altered gait    Insurance/Certification information: PT Insurance Information: Medical [place] 20 PCY   12 used   Referring Physician:  Referring Practitioner: Shirley Castillo  Next Doctor Visit:    Plan of care signed (Y/N):  yes  Outcome Measure: KOOS 54%  Visit# / total visits:   3/12 POC  Pain level: 0/10       Goals:     Patient goals : walking and running w/o pain/limit, no device, get it bending again. Short term goals  Time Frame for Short term goals: 3 weeks  Short term goal 1: Pt will be able to report min pain w/ usual activity Met   Short term goal 2: Pt will be able to walk w/ SPC or less at least 50% of the time Very good progress   Short term goal 3: Pt will be able to achieve at least 100° flex for ease of gait and stairs Met   Long term goals  Time Frame for Long term goals : 6 weeks  Long term goal 1: Pt will have improved KOOS score by 20% or more  Long term goal 2: Pt will be able to walk w/o device at least 75% of the time w/o knee pain  Long term goal 3: Pt will be able to go up/down stairs reciprocally w/o pain or limit    Summary of Evaluation: Assessment: Pt is 59 yo female who presents just 8 days postop L TKA. She had R TKA 10/6/21. She ambulates w/ RW w/ good WB and mild limit to knee flex/ext L. She has min to mod pain w/ limited AROM and strength in L LE.   She will benefit from PT to help restore these deficits so she can return to normal sleep, gait and functional activity w/o pain. Prior to 2021 she was walking w/o device w/o pain and on feet all day w/ min pain. Subjective:   Jesse Nugent arrives to therapy stating that the knee is feeling fine, no pain currently. Felt fine after her last session. She reports not much pain with really anything at home. Walks with cane but does leave it occasionally. Any changes in Ambulatory Summary Sheet? None        Objective:  COVID screening questions were asked and patient attested that there had been no contact or symptoms    Ambulates into clinic with SPC and mild limp secondary to decreased terminal knee extension. AROM  Flexion 112°; 116°  Extension 2° hyper     Cues for keeping knee straight during SLR, cued to tap calf to table before heel. Exercises: (No more than 4 columns)   Exercise/Equipment 12/28/21  #1 1/3/22 #2 1/5/2022 #3           WARM UP       Nustep  Lev 1, S8 5', after some ROM this date S8, Lev 3, 5'  5'          TABLE      Quad set  10x5\" 20x5\" 20*5\"   SLR  Min A 10x2 10x3  3*10   SAQ  10x2, min A 20x3\" 20*3\"   LAQ 10x 10x3\" 2*10 3\"   Seated flex  10x  10x3\" 10*3\"   Hip abd   10x2  S/L 2*10   Heel slide   20x w/ strap  --   Bridge   10x2  2*10                        STANDING      Gait  -- SPCane 100 ft x3 --   Step up ant   4\" 10x2 4\" 2*10   Heel raise   20x  FR 2*10   HS curl                              PROPRIOCEPTION      Wobble board balance   30\"x2 ea way 2*30\" ea dir                            MODALITIES      Vaso  10'   10'             Other Therapeutic Activities/Education:  12/28/2021: Patient received education on their current pathology and how their condition effects them with their functional activities. Patient understood discussion and questions were answered. Patient understands their activity limitations and understands rational for treatment progression.         Home Exercise Program:  Continue current from prior hand outs      Manual Treatments:  PROM to yanni flex       Modalities: Vaso to L knee, pt recumb w/ leg on pillow, 10'      Communication with other providers:  POC set for cosign 12/28/21      Assessment:    Galina's flexion ROM has improved just since her last session. She is guarded with PROM but does gain some range with a few stretches. Strength looks very good for stage of healing. She does ambulate with a slight limp likely due to some terminal quad weakness. She will benefit from skilled PT services in order to gain maximum amount of flexion ROM and build functional strength and stability.  End session pain: 0/10      Plan for Next Session:  progress ROM, strength, gait, balance to yanni, pain and edema control      Time In / Time Out:    1430/1514       Timed Code/Total Treatment Minutes:  34'/44' 1 vaso 10' 2 TE     Next Progress Note due:  30 days     Plan of Care Interventions:  [x] Therapeutic Exercise  [x] Modalities:  [x] Therapeutic Activity     [] Ultrasound  [x] Estim  [x] Gait Training      [] Cervical Traction [] Lumbar Traction  [x] Neuromuscular Re-education    [x] Cold/hotpack [] Iontophoresis   [x] Instruction in HEP      [x] Vasopneumatic   [] Dry Needling    [x] Manual Therapy               [] Aquatic Therapy              Electronically signed by:  Lisa Lim PTA        1/5/2022, 11:04 AM

## 2022-01-06 ENCOUNTER — HOSPITAL ENCOUNTER (OUTPATIENT)
Dept: WOMENS IMAGING | Age: 57
Discharge: HOME | End: 2022-01-06
Payer: COMMERCIAL

## 2022-01-06 DIAGNOSIS — Z12.31 ENCOUNTER FOR SCREENING MAMMOGRAM FOR MALIGNANT NEOPLASM OF BREAST: ICD-10-CM

## 2022-01-06 PROCEDURE — 77063 BREAST TOMOSYNTHESIS BI: CPT

## 2022-01-11 ENCOUNTER — HOSPITAL ENCOUNTER (OUTPATIENT)
Dept: PHYSICAL THERAPY | Age: 57
Setting detail: THERAPIES SERIES
Discharge: HOME | End: 2022-01-11
Payer: COMMERCIAL

## 2022-01-11 ENCOUNTER — OFFICE VISIT (OUTPATIENT)
Dept: ORTHOPEDIC SURGERY | Age: 57
End: 2022-01-11

## 2022-01-11 VITALS — RESPIRATION RATE: 15 BRPM | HEIGHT: 61 IN | BODY MASS INDEX: 29.64 KG/M2 | WEIGHT: 157 LBS

## 2022-01-11 DIAGNOSIS — Z09 POSTOP CHECK: Primary | ICD-10-CM

## 2022-01-11 PROCEDURE — 97530 THERAPEUTIC ACTIVITIES: CPT

## 2022-01-11 PROCEDURE — 99024 POSTOP FOLLOW-UP VISIT: CPT | Performed by: ORTHOPAEDIC SURGERY

## 2022-01-11 PROCEDURE — 97110 THERAPEUTIC EXERCISES: CPT

## 2022-01-11 PROCEDURE — 97016 VASOPNEUMATIC DEVICE THERAPY: CPT

## 2022-01-11 NOTE — PROGRESS NOTES
1/11/2022   Chief Complaint   Patient presents with    Post-Op Check     3 wk s/p left TKA 12/20/21        History of Present Illness:                             Miranda Jj is a 60 y.o. female who returns today for follow-up of her left total knee replacement. She is doing well without any complaints or concerns. Physical therapy is coming along nicely and she is pleased with her mobility and pain relief. She is weaned off the pain medications. She feels that her left knee is healing very well and quickly. Medical History  Patient's medications, allergies, past medical, surgical, social and family histories were reviewed and updated as appropriate. Review of Systems                                            Examination:  General Exam:  Vitals: Resp 15   Ht 5' 1\" (1.549 m)   Wt 157 lb (71.2 kg)   BMI 29.66 kg/m²    Physical Exam   Left Lower Extremity:    The incision is healing well and skin edges are well approximated. Mild ecchymosis present and resolving. No erythema, drainage, or induration. Mild soft tissue swelling present throughout the soft tissues of the knee. Range of motion is present from full extension to 110 degrees of flexion. Calf is soft and nontender. Negative Homans sign. Sensation and motor function is intact at the knee and ankle. Diagnostic testing:  X-rays reviewed in office, I independently reviewed the films in the office today:   XR KNEE LEFT (3 VIEWS)    Result Date: 1/11/2022  3 views of the left knee show excellent alignment of the total knee replacement with good bone cement interface. No evidence of fracture loosening or postoperative complication. Office Procedures:  No orders of the defined types were placed in this encounter. Assessment and Plan  1. Left total knee replacement    2. History of previous right total knee replacement        The patient is healing appropriately well following the surgery.   We reviewed the x-rays. We also discussed the healing incision site. I have explained to them the importance of continuing physical therapy to advance stretching, range of motion and strengthening. We discussed the use of gait aids and progression of ambulatory activities at the guidance of the patient's therapist.    We discussed over-the-counter medications as needed. I recommended follow-up here in 4 weeks for repeat exam to evaluate progression of range of motion and strengthening. They will return sooner if there are any concerns for infection, swelling, stiffness, or other concerns.           Electronically signed by Zakia Gaming MD on 1/11/2022 at 8:54 AM

## 2022-01-11 NOTE — FLOWSHEET NOTE
Outpatient Physical Therapy  [place]           [x] Phone: [phone]   Fax: [phone]  [facility]           [] Phone: [phone]   Fax: [phone]        Physical Therapy Daily Treatment Note  Date:  2022    Patient Name:  Marilyn Adamson    :  1965  MRN: [id number]  Restrictions/Precautions: Other position/activity restrictions: postop L TKA  Diagnosis:   Diagnosis: L TKA  Date of Injury/Surgery:  21   (R TKA 10/6/21)  Treatment Diagnosis: Treatment Diagnosis: L knee pain, stiffness, weakness, altered gait    Insurance/Certification information: PT Insurance Information: Medical [place]  PCY   12 used   Referring Physician:  Referring Practitioner: Scarlet Jones  Next Doctor Visit:    Plan of care signed (Y/N):  yes  Outcome Measure: KOOS 54%  Visit# / total visits:    POC  Pain level: 0/10       Goals:     Patient goals : walking and running w/o pain/limit, no device, get it bending again. Short term goals  Time Frame for Short term goals: 3 weeks  Short term goal 1: Pt will be able to report min pain w/ usual activity Met   Short term goal 2: Pt will be able to walk w/ SPC or less at least 50% of the time Very good progress   Short term goal 3: Pt will be able to achieve at least 100° flex for ease of gait and stairs Met   Long term goals  Time Frame for Long term goals : 6 weeks  Long term goal 1: Pt will have improved KOOS score by 20% or more  Long term goal 2: Pt will be able to walk w/o device at least 75% of the time w/o knee pain  Long term goal 3: Pt will be able to go up/down stairs reciprocally w/o pain or limit    Summary of Evaluation: Assessment: Pt is 59 yo female who presents just 8 days postop L TKA. She had R TKA 10/6/21. She ambulates w/ RW w/ good WB and mild limit to knee flex/ext L. She has min to mod pain w/ limited AROM and strength in L LE.   She will benefit from PT to help restore these deficits so she can return to normal sleep, gait and functional activity w/o pain. Prior to 2021 she was walking w/o device w/o pain and on feet all day w/ min pain. Subjective:    No pain just stiff. Any changes in Ambulatory Summary Sheet? None        Objective:  COVID screening questions were asked and patient attested that there had been no contact or symptoms    Ambulates into clinic with SPC and mild limp secondary to decreased terminal knee extension, but not WB thru cane much. Very close to full revolution on rec bike BW, but not FW yet. AROM  Flexion 118° AAROM  Extension 2° hyper         Exercises: (No more than 4 columns)   Exercise/Equipment 12/28/21  #1 1/3/22 #2 1/5/2022 #3 1/11/22  #4            WARM UP        Nustep  Lev 1, S8 5', after some ROM this date S8, Lev 3, 5'  5'  --   Rec bike    -- S11.5, rocking           TABLE  (L)       Quad set  10x5\" 20x5\" 20*5\" W/ SLR   SLR  Min A 10x2 10x3  3*10 10x3   SAQ  10x2, min A 20x3\" 20*3\" 20x3\"   LAQ 10x 10x3\" 2*10 3\" 20x3\"    Seated flex  10x  10x3\" 10*3\" 10x5\"    Hip abd   10x2  S/L 2*10 S/l 10x2   Heel slide   20x w/ strap  -- 20x w/ strap   Bridge   10x2  2*10 10x2                            STANDING       Gait  -- SPCane 100 ft x3 -- -   Step up ant   4\" 10x2 4\" 2*10 4\" 10x2    Heel raise   20x  FR 2*10 Edge of step 10x2    HS curl    - -   Total gym squat    - Lev 10, 10x2                       PROPRIOCEPTION       Wobble board balance   30\"x2 ea way 2*30\" ea dir  30\"x2 ea way                               MODALITIES       Vaso  10'   10' 10'              Other Therapeutic Activities/Education:  12/28/2021: Patient received education on their current pathology and how their condition effects them with their functional activities. Patient understood discussion and questions were answered. Patient understands their activity limitations and understands rational for treatment progression.         Home Exercise Program:  Continue current from prior hand outs      Manual Treatments:  PROM to yanni

## 2022-01-11 NOTE — PATIENT INSTRUCTIONS
Continue weight-bearing as tolerated. Continue range of motion exercises as instructed. Ice and elevate as needed. Tylenol or Motrin for pain.   Follow up in one month  continue working with therapy

## 2022-01-13 ENCOUNTER — HOSPITAL ENCOUNTER (OUTPATIENT)
Dept: PHYSICAL THERAPY | Age: 57
Setting detail: THERAPIES SERIES
Discharge: HOME | End: 2022-01-13
Payer: COMMERCIAL

## 2022-01-13 PROCEDURE — 97110 THERAPEUTIC EXERCISES: CPT

## 2022-01-13 PROCEDURE — 97016 VASOPNEUMATIC DEVICE THERAPY: CPT

## 2022-01-13 NOTE — FLOWSHEET NOTE
Outpatient Physical Therapy  [place]           [x] Phone: [phone]   Fax: [phone]  [facility]           [] Phone: [phone]   Fax: [phone]        Physical Therapy Daily Treatment Note  Date:  2022    Patient Name:  Paulina Romeo    :  1965  MRN: [id number]  Restrictions/Precautions: Other position/activity restrictions: postop L TKA  Diagnosis:   Diagnosis: L TKA  Date of Injury/Surgery:  21   (R TKA 10/6/21)  Treatment Diagnosis: Treatment Diagnosis: L knee pain, stiffness, weakness, altered gait    Insurance/Certification information: PT Insurance Information: Medical [place] [employer] PCY   12 used   Referring Physician:  Referring Practitioner: Stephy Bob Doctor Visit:    Plan of care signed (Y/N):  yes  Outcome Measure: KOOS 54%  Visit# / total visits:    POC  Pain level: 0/10       Goals:     Patient goals : walking and running w/o pain/limit, no device, get it bending again. Short term goals  Time Frame for Short term goals: 3 weeks  Short term goal 1: Pt will be able to report min pain w/ usual activity Met   Short term goal 2: Pt will be able to walk w/ SPC or less at least 50% of the time Very good progress   Short term goal 3: Pt will be able to achieve at least 100° flex for ease of gait and stairs Met   Long term goals  Time Frame for Long term goals : 6 weeks  Long term goal 1: Pt will have improved KOOS score by 20% or more  Long term goal 2: Pt will be able to walk w/o device at least 75% of the time w/o knee pain  Long term goal 3: Pt will be able to go up/down stairs reciprocally w/o pain or limit    Summary of Evaluation: Assessment: Pt is 59 yo female who presents just 8 days postop L TKA. She had R TKA 10/6/21. She ambulates w/ RW w/ good WB and mild limit to knee flex/ext L. She has min to mod pain w/ limited AROM and strength in L LE.   She will benefit from PT to help restore these deficits so she can return to normal sleep, gait and functional activity w/o pain. Prior to 2021 she was walking w/o device w/o pain and on feet all day w/ min pain. Subjective:     Dandre Arias arrives to therapy stating that the knee is good, no pain currently. Any changes in Ambulatory Summary Sheet? None        Objective:  COVID screening questions were asked and patient attested that there had been no contact or symptoms       AROM  Flexion 119°      Exercises: (No more than 4 columns)   Exercise/Equipment 1/5/2022 #3 1/11/22  #4 1/13/2022 #5             WARM UP        Nustep  5'  --     Rec bike  -- S11.5, rocking  S11.5 rocking 5'           TABLE  (L)       Quad set  20*5\" W/ SLR ---    SLR  3*10 10x3 3*10    SAQ  20*3\" 20x3\" 20*    LAQ 2*10 3\" 20x3\"  --    Seated flex  10*3\" 10x5\"  10*5\"     Hip abd  S/L 2*10 S/l 10x2 2*10    Heel slide  -- 20x w/ strap 20* with strap     Bridge  2*10 10x2  2*10                            STANDING       Step up ant  4\" 2*10 4\" 10x2  6\" 2*10    Heel raise  FR 2*10 Edge of step 10x2  Edge of wooden step 2*10    Total gym squat  - Lev 10, 10x2  L10 2*10                       PROPRIOCEPTION       Wobble board balance  2*30\" ea dir  30\"x2 ea way 2*30\" ea                                 MODALITIES       Vaso  10' 10' 10'               Other Therapeutic Activities/Education:  12/28/2021: Patient received education on their current pathology and how their condition effects them with their functional activities. Patient understood discussion and questions were answered. Patient understands their activity limitations and understands rational for treatment progression. Home Exercise Program:  Continue current from prior hand outs      Manual Treatments:        Modalities:  Vaso to L knee, pt recumb w/ leg on pillow, 10'      Communication with other providers:  POC set for cosign 12/28/21      Assessment:    Galina tolerated some progression of exercise this date without adverse affects. She continues to progress with strength and ROM.

## 2022-01-18 ENCOUNTER — HOSPITAL ENCOUNTER (OUTPATIENT)
Dept: PHYSICAL THERAPY | Age: 57
Setting detail: THERAPIES SERIES
Discharge: HOME | End: 2022-01-18
Payer: COMMERCIAL

## 2022-01-18 PROCEDURE — 97110 THERAPEUTIC EXERCISES: CPT

## 2022-01-18 PROCEDURE — 97016 VASOPNEUMATIC DEVICE THERAPY: CPT

## 2022-01-18 PROCEDURE — 97530 THERAPEUTIC ACTIVITIES: CPT

## 2022-01-18 NOTE — FLOWSHEET NOTE
Outpatient Physical Therapy  [place]           [x] Phone: [phone]   Fax: [phone]  [facility]           [] Phone: [phone]   Fax: [phone]        Physical Therapy Daily Treatment Note  Date:  2022    Patient Name:  Tori Friend    :  1965  MRN: [id number]  Restrictions/Precautions: Other position/activity restrictions: postop L TKA  Diagnosis:   Diagnosis: L TKA  Date of Injury/Surgery:  21   (R TKA 10/6/21)  Treatment Diagnosis: Treatment Diagnosis: L knee pain, stiffness, weakness, altered gait    Insurance/Certification information: PT Insurance Information: Medical [place]  PCY   12 used   Referring Physician:  Referring Practitioner: Socorro Oppenheim  Next Doctor Visit:    Plan of care signed (Y/N):  yes  Outcome Measure: KOOS 54%  Visit# / total visits:    POC  Pain level: 0/10       Goals:     Patient goals : walking and running w/o pain/limit, no device, get it bending again. Short term goals  Time Frame for Short term goals: 3 weeks  Short term goal 1: Pt will be able to report min pain w/ usual activity Met   Short term goal 2: Pt will be able to walk w/ SPC or less at least 50% of the time Very good progress   Short term goal 3: Pt will be able to achieve at least 100° flex for ease of gait and stairs Met   Long term goals  Time Frame for Long term goals : 6 weeks  Long term goal 1: Pt will have improved KOOS score by 20% or more  Long term goal 2: Pt will be able to walk w/o device at least 75% of the time w/o knee pain  Long term goal 3: Pt will be able to go up/down stairs reciprocally w/o pain or limit    Summary of Evaluation: Assessment: Pt is 61 yo female who presents just 8 days postop L TKA. She had R TKA 10/6/21. She ambulates w/ RW w/ good WB and mild limit to knee flex/ext L. She has min to mod pain w/ limited AROM and strength in L LE.   She will benefit from PT to help restore these deficits so she can return to normal sleep, gait and functional activity w/o pain. Prior to 2021 she was walking w/o device w/o pain and on feet all day w/ min pain. Subjective:  Pt stated that she is not having any pain just stiffness. Pt stated that she sees her surgeon on the 8th of Feb.       Any changes in Ambulatory Summary Sheet? None        Objective:  COVID screening questions were asked and patient attested that there had been no contact or symptoms  Full revolution on R -bike fwd and backward after a minute or so of rocking. AAROM  Flexion 120°  Full extension    Exercises: (No more than 4 columns)   Exercise/Equipment 1/5/2022 #3 1/11/22  #4 1/13/2022 #5 1/18/2022#6            WARM UP        Nustep  5'  --     Rec bike  -- S11.5, rocking  S11.5 rocking 5' 5\" full revolutions bwd and fwd          TABLE  (L)       Quad set  20*5\" W/ SLR ---    SLR  3*10 10x3 3*10 15x   SAQ  20*3\" 20x3\" 20*    LAQ 2*10 3\" 20x3\"  --    Seated flex  10*3\" 10x5\"  10*5\"     Hip abd  S/L 2*10 S/l 10x2 2*10    Heel slide  -- 20x w/ strap 20* with strap  20x w/ strap and hold at end range   Bridge  2*10 10x2  2*10     prone Hamstring curl    10x   Prone quad stretch    30\" x2             STANDING       Step up ant  4\" 2*10 4\" 10x2  6\" 2*10 6\" 10x2 ant  4\" 10x2 lat   Heel raise  FR 2*10 Edge of step 10x2  Edge of wooden step 2*10 Edge of TM 10x2    Total gym squat  - Lev 10, 10x2  L10 2*10    Hamstring curl     10x2   Shuttle leg press     DL 3C 10x2  L only 1C 10x2    PROPRIOCEPTION       Wobble board balance  2*30\" ea dir  30\"x2 ea way 2*30\" ea  30\" x2 ea dir                               MODALITIES       Vaso  10' 10' 10' 10'               Other Therapeutic Activities/Education:  12/28/2021: Patient received education on their current pathology and how their condition effects them with their functional activities. Patient understood discussion and questions were answered.  Patient understands their activity limitations and understands rational for treatment progression. Home Exercise Program:  Continue current from prior hand outs      Manual Treatments:        Modalities:  Vaso to L knee, pt recumb w/ leg on pillow, 10'      Communication with other providers:  POC set for cosign 12/28/21      Assessment:  Pt tolerated treatment fairly well. Pt was able to advance activity today. Pt rated pain at 0/10 after vaso. Pt will continue to benefit from more strengthening.        Plan for Next Session:  progress ROM, strength, gait, balance to yanni, pain and edema control      Time In / Time Out:  1530/ 1613        Timed Code/Total Treatment Minutes:   33'/ 37' 1 TE ( 20') 1 TA ( 13') 1 vaso (10')     Next Progress Note due:  30 days     Plan of Care Interventions:  [x] Therapeutic Exercise  [x] Modalities:  [x] Therapeutic Activity     [] Ultrasound  [x] Estim  [x] Gait Training      [] Cervical Traction [] Lumbar Traction  [x] Neuromuscular Re-education    [x] Cold/hotpack [] Iontophoresis   [x] Instruction in HEP      [x] Vasopneumatic   [] Dry Needling    [x] Manual Therapy               [] Aquatic Therapy              Electronically signed by:  Jaden Hwang PTA     1/18/2022, 12:37 PM      1/18/2022,6:32 PM

## 2022-01-20 ENCOUNTER — HOSPITAL ENCOUNTER (OUTPATIENT)
Dept: PHYSICAL THERAPY | Age: 57
Setting detail: THERAPIES SERIES
Discharge: HOME | End: 2022-01-20
Payer: COMMERCIAL

## 2022-01-20 PROCEDURE — 97016 VASOPNEUMATIC DEVICE THERAPY: CPT

## 2022-01-20 PROCEDURE — 97530 THERAPEUTIC ACTIVITIES: CPT

## 2022-01-20 PROCEDURE — 97110 THERAPEUTIC EXERCISES: CPT

## 2022-01-20 NOTE — FLOWSHEET NOTE
Outpatient Physical Therapy  [place]           [x] Phone: [phone]   Fax: [phone]  [facility]           [] Phone: [phone]   Fax: [phone]        Physical Therapy Daily Treatment Note  Date:  2022    Patient Name:  Angelina Jackson    :  1965  MRN: [id number]  Restrictions/Precautions: Other position/activity restrictions: postop L TKA  Diagnosis:   Diagnosis: L TKA  Date of Injury/Surgery:  21   (R TKA 10/6/21)  Treatment Diagnosis: Treatment Diagnosis: L knee pain, stiffness, weakness, altered gait    Insurance/Certification information: PT Insurance Information: Medical [place]  PCY   12 used   Referring Physician:  Referring Practitioner: Barbi Bob Doctor Visit:    Plan of care signed (Y/N):  yes  Outcome Measure: KOOS 54%  Visit# / total visits:    POC  Pain level: 0/10       Goals:     Patient goals : walking and running w/o pain/limit, no device, get it bending again. Short term goals  Time Frame for Short term goals: 3 weeks  Short term goal 1: Pt will be able to report min pain w/ usual activity Met   Short term goal 2: Pt will be able to walk w/ SPC or less at least 50% of the time  Met  Short term goal 3: Pt will be able to achieve at least 100° flex for ease of gait and stairs Met   Long term goals  Time Frame for Long term goals : 6 weeks  Long term goal 1: Pt will have improved KOOS score by 20% or more  Met  Long term goal 2: Pt will be able to walk w/o device at least 75% of the time w/o knee pain  Met   Long term goal 3: Pt will be able to go up/down stairs reciprocally w/o pain or limit  Progressing  New Goal: Pt will be able to jog lightly 40-50ft w/o pain or limit. New and pt goal  KOOS 54% at [facility], 22: 11%      Summary of Evaluation: Assessment: Pt is 59 yo female who presents just 8 days postop L TKA. She had R TKA 10/6/21. She ambulates w/ RW w/ good WB and mild limit to knee flex/ext L.   She has min to mod pain w/ limited AROM and strength in L LE. She will benefit from PT to help restore these deficits so she can return to normal sleep, gait and functional activity w/o pain. Prior to 2021 she was walking w/o device w/o pain and on feet all day w/ min pain. Subjective:   Pt reports some calf cramping B when doing some prolonged standing and cooking the other day, but otherwise continues to report no knee pain. Any changes in Ambulatory Summary Sheet? None        Objective:  COVID screening questions were asked and patient attested that there had been no contact or symptoms     AROM L knee: Flexion 121°  Full extension  Pt has very good quad tone and SLR w/o lag. Ambulation w/o device w/ good mech. Exercises: (No more than 4 columns)   Exercise/Equipment 1/13/2022 #5 1/18/2022#6 1/20/22  #7             WARM UP        Nustep        Rec bike  S11.5 rocking 5' 5\" full revolutions bwd and fwd S 11.5 5'            TABLE  (L)       SLR  3*10 15x 1# prox calf 10x3     Hip abd  2*10  1# 10x2    Heel slide  20* with strap  20x w/ strap and hold at end range 20x w/ strap and hold at end range    Bridge  2*10  10x2     Prone quad stretch  30\" x2 Stand w/ strap 3x10\"               STANDING       Step up ant  6\" 2*10 6\" 10x2 ant  4\" 10x2 lat 6\"  20x    Lateral heel touch   - 4\" 10x2 ea LE     Heel raise  Edge of wooden step 2*10 Edge of TM 10x2  --    Hamstring curl   10x2 Seated GTB 10x2 cybex    Calf stretch    Regular 10\"x3 ea LE           Shuttle leg press   DL 3C 10x2  L only 1C 10x2  B 4C 10x2  U 2C 10x2 ea LE    PROPRIOCEPTION       Wobble board balance  2*30\" ea  30\" x2 ea dir 30\" ea way 60\"   SLS    30\" ea LE                         MODALITIES       Vaso  10' 10'  10'               Other Therapeutic Activities/Education:  12/28/2021: Patient received education on their current pathology and how their condition effects them with their functional activities. Patient understood discussion and questions were answered. Patient understands their activity limitations and understands rational for treatment progression. Home Exercise Program:  Continue current from prior hand outs      Manual Treatments:        Modalities:  Vaso to L knee, pt recumb w/ leg on pillow, 10'      Communication with other providers:  POC set for cosign 12/28/21,See PN 1/20/22       Assessment:  Pt tolerated treatment well. Pt is making great progress at this stage of healing for her L TKA. She has met many of her goals already but continues to have some strength and functional deficits w/ those goals not met yet and pt would continue to benefit from skilled therapy interventions to address remaining impairments, improve mobility and strength and progress toward goal completion while reducing risk for re-injury or further decline. Pt rated pain at 0/10 after vaso.        Plan for Next Session:  progress ROM, strength, balance to yanni, pain and edema control prn      Time In / Time Out:   1510/1600       Timed Code/Total Treatment Minutes:  40/50     1 TE ( 20') 1 TA ( 20') 1 vaso (10')     Next Progress Note due:  See PN 1/20/22     Plan of Care Interventions:  [x] Therapeutic Exercise  [x] Modalities:  [x] Therapeutic Activity     [] Ultrasound  [x] Estim  [x] Gait Training      [] Cervical Traction [] Lumbar Traction  [x] Neuromuscular Re-education    [x] Cold/hotpack [] Iontophoresis   [x] Instruction in HEP      [x] Vasopneumatic   [] Dry Needling    [x] Manual Therapy               [] Aquatic Therapy              Electronically signed by:  Ion Bach, PT,  PT, MPT, ATC     1/20/2022, 7:56 PM

## 2022-01-20 NOTE — PROGRESS NOTES
Outpatient Physical Therapy           [place]           [x] Phone: [phone]   Fax: [phone]  [facility]           [] Phone: [phone]   Fax: [phone]      To: Mary Gresham       From: Mckinley Almonte, PT, PT     Patient: Harrell Nyhan                  : 1965  Diagnosis: L TKA      Date: 2022  Treatment Diagnosis: knee pain, stiffness, weakness, altered gait    [x]  Progress Note                []  Discharge Note    Evaluation Date:  2021 Total Visits to date:   7 Cancels/No-shows to date:  0    Subjective: Pt reports some calf cramping B when doing some prolonged standing and cooking the other day, but otherwise continues to report no knee pain. Plan of Care/Treatment to date:  [x] Therapeutic Exercise    [x] Modalities:  [x] Therapeutic Activity     [] Ultrasound  [] Electrical Stimulation  [x] Gait Training      [] Cervical Traction   [] Lumbar Traction  [x] Neuromuscular Re-education  [x] Cold/hotpack [] Iontophoresis  [x] Instruction in HEP      Other:  [x] Manual Therapy       [x]  Vasopneumatic  [] Aquatic Therapy       []   Dry Needle Therapy                      Objective/Significant Findings At Last Visit/Comments:  COVID screening questions were asked and patient attested that there had been no contact or symptoms     AROM L knee: Flexion 121°  Full extension  Pt has very good quad tone and SLR w/o lag. Ambulation w/o device w/ good mech. Assessment:   Pt tolerated treatment well. Pt is making great progress at this stage of healing for her L TKA. She has met many of her goals already but continues to have some strength and functional deficits w/ those goals not met yet and pt would continue to benefit from skilled therapy interventions to address remaining impairments, improve mobility and strength and progress toward goal completion while reducing risk for re-injury or further decline.   Pt rated pain at 0/10 after vaso.      Goal Status:  [] Achieved []

## 2022-01-25 ENCOUNTER — HOSPITAL ENCOUNTER (OUTPATIENT)
Dept: PHYSICAL THERAPY | Age: 57
Setting detail: THERAPIES SERIES
Discharge: HOME | End: 2022-01-25
Payer: COMMERCIAL

## 2022-01-25 PROCEDURE — 97016 VASOPNEUMATIC DEVICE THERAPY: CPT

## 2022-01-25 PROCEDURE — 97110 THERAPEUTIC EXERCISES: CPT

## 2022-01-25 PROCEDURE — 97530 THERAPEUTIC ACTIVITIES: CPT

## 2022-01-25 NOTE — FLOWSHEET NOTE
Outpatient Physical Therapy  [place]           [x] Phone: [phone]   Fax: [phone]  [place] Keb[place]           [] Phone: [phone]   Fax: [phone]        Physical Therapy Daily Treatment Note  Date:  2022    Patient Name:  Sydnie Fraser    :  1965  MRN: [id number]  Restrictions/Precautions: Other position/activity restrictions: postop L TKA  Diagnosis:   Diagnosis: L TKA  Date of Injury/Surgery:  21   (R TKA 10/6/21)  Treatment Diagnosis: Treatment Diagnosis: L knee pain, stiffness, weakness, altered gait    Insurance/Certification information: PT Insurance Information: Medical [place]  PCY   12 used   Referring Physician:  Referring Practitioner: Nina Bob Doctor Visit:    Plan of care signed (Y/N):  yes  Outcome Measure: KOOS 54%  Visit# / total visits:    POC  Pain level: 0/10       Goals:     Patient goals : walking and running w/o pain/limit, no device, get it bending again. Short term goals  Time Frame for Short term goals: 3 weeks  Short term goal 1: Pt will be able to report min pain w/ usual activity Met   Short term goal 2: Pt will be able to walk w/ SPC or less at least 50% of the time  Met  Short term goal 3: Pt will be able to achieve at least 100° flex for ease of gait and stairs Met   Long term goals  Time Frame for Long term goals : 6 weeks  Long term goal 1: Pt will have improved KOOS score by 20% or more  Met  Long term goal 2: Pt will be able to walk w/o device at least 75% of the time w/o knee pain  Met   Long term goal 3: Pt will be able to go up/down stairs reciprocally w/o pain or limit  Progressing  New Goal: Pt will be able to jog lightly 40-50ft w/o pain or limit. New and pt goal  KOOS 54% at [facility], 22: 11%      Summary of Evaluation: Assessment: Pt is 59 yo female who presents just 8 days postop L TKA. She had R TKA 10/6/21. She ambulates w/ RW w/ good WB and mild limit to knee flex/ext L.   She has min to mod pain w/ limited AROM and strength in L LE. She will benefit from PT to help restore these deficits so she can return to normal sleep, gait and functional activity w/o pain. Prior to 2021 she was walking w/o device w/o pain and on feet all day w/ min pain. Subjective:  Pt reports no issues since here last, but does get stiff at times. Did fine w/ progressions from last Rx. Any changes in Ambulatory Summary Sheet? None        Objective:  COVID screening questions were asked and patient attested that there had been no contact or symptoms     AROM L knee: Flexion 127°  Full extension  Some cues w/ form on SLR today, keep full ext all the way down. Cues w/ lateral heel touch not to drop hip at bottom. Pt had a little harder time on shuttle leg press today, had trouble U LLE. She was more fatigued overall w/ Rx today. Exercises: (No more than 4 columns)   Exercise/Equipment 1/18/2022#6 1/20/22  #7 1/25/22  #8             WARM UP        Nustep        Rec bike  5\" full revolutions bwd and fwd S 11.5 5'  S11.5, 5'            TABLE  (L)       SLR  15x 1# prox calf 10x3  1# prox calf 10x3     Hip abd   1# 10x2 1# 10x2    Heel slide  20x w/ strap and hold at end range 20x w/ strap and hold at end range 20x w/ strap and hold at end range    Bridge   10x2  Legs on ball, knees straight 10x2     Prone quad stretch 30\" x2 Stand w/ strap 3x10\"  Stand w/ strap 30\"x2              STANDING       Step up ant  6\" 10x2 ant  4\" 10x2 lat 6\"  20x 6\" 20x     Lateral heel touch  - 4\" 10x2 ea LE  4\" 10x2  6\" ? ?    Heel raise  Edge of TM 10x2  -- --    Hamstring curl  10x2 Seated GTB 10x2 cybex     Calf stretch   Regular 10\"x3 ea LE --    Mini lunge ant     Lateral   -- Alt LE's small 10x ea LE ea dir    Shuttle leg press  DL 3C 10x2  L only 1C 10x2  B 4C 10x2  U 2C 10x2 ea LE B 4C 10x2  U 2C 10x2 ea LE    PROPRIOCEPTION       Wobble board balance  30\" x2 ea dir 30\" ea way 60\" ea way    SLS   30\" ea LE 30\"x2 ea LE MODALITIES       Vaso  10'  10' 10'               Other Therapeutic Activities/Education:  12/28/2021: Patient received education on their current pathology and how their condition effects them with their functional activities. Patient understood discussion and questions were answered. Patient understands their activity limitations and understands rational for treatment progression. Home Exercise Program:  Continue current from prior hand outs      Manual Treatments:        Modalities:  Vaso to L knee, pt recumb w/ leg on pillow, 10'      Communication with other providers:  POC set for cosign 12/28/21,See PN 1/20/22       Assessment:  Pt tolerated treatment well w/ min to mod fatigue. Pt is making great progress. She has met many of her goals already but continues to have some strength and functional deficits as well as decreased endurance and pt would continue to benefit from skilled therapy interventions to address remaining impairments, improve mobility and strength and progress toward goal completion while reducing risk for re-injury or further decline. Pt rated pain at 0/10 after vaso.        Plan for Next Session:  progress ROM, strength, balance to yanni, pain and edema control prn      Time In / Time Out:    1335/1425       Timed Code/Total Treatment Minutes:  40/50    2 TE (20') 1 TA ( 20') 1 vaso (10')     Next Progress Note due:  See PN 1/20/22     Plan of Care Interventions:  [x] Therapeutic Exercise  [x] Modalities:  [x] Therapeutic Activity     [] Ultrasound  [x] Estim  [x] Gait Training      [] Cervical Traction [] Lumbar Traction  [x] Neuromuscular Re-education    [x] Cold/hotpack [] Iontophoresis   [x] Instruction in HEP      [x] Vasopneumatic   [] Dry Needling    [x] Manual Therapy               [] Aquatic Therapy              Electronically signed by:  Bobbi Mccarty, PT,  PT, MPT, ATC       1/25/2022, 2:22 PM

## 2022-01-27 ENCOUNTER — HOSPITAL ENCOUNTER (OUTPATIENT)
Dept: PHYSICAL THERAPY | Age: 57
Setting detail: THERAPIES SERIES
Discharge: HOME | End: 2022-01-27
Payer: COMMERCIAL

## 2022-01-27 PROCEDURE — 97016 VASOPNEUMATIC DEVICE THERAPY: CPT

## 2022-01-27 PROCEDURE — 97110 THERAPEUTIC EXERCISES: CPT

## 2022-01-27 PROCEDURE — 97530 THERAPEUTIC ACTIVITIES: CPT

## 2022-01-27 NOTE — FLOWSHEET NOTE
Outpatient Physical Therapy  [place]           [x] Phone: [phone]   Fax: [phone]  Ruslan Saenz           [] Phone: [phone]   Fax: [phone]        Physical Therapy Daily Treatment Note  Date:  2022    Patient Name:  Adrián Tello    :  1965  MRN: [id number]  Restrictions/Precautions: Other position/activity restrictions: postop L TKA  Diagnosis:   Diagnosis: L TKA  Date of Injury/Surgery:  21   (R TKA 10/6/21)  Treatment Diagnosis: Treatment Diagnosis: L knee pain, stiffness, weakness, altered gait    Insurance/Certification information: PT Insurance Information: Medical [place] 20 PCY   13 used in ,   Referring Physician:  Referring Practitioner: Gordon Bob Doctor Visit:    Plan of care signed (Y/N):  yes  Outcome Measure: KOOS 54%  Visit# / total visits:    POC (1 used )  Pain level: 0/10       Goals:     Patient goals : walking and running w/o pain/limit, no device, get it bending again. Short term goals  Time Frame for Short term goals: 3 weeks  Short term goal 1: Pt will be able to report min pain w/ usual activity Met   Short term goal 2: Pt will be able to walk w/ SPC or less at least 50% of the time  Met  Short term goal 3: Pt will be able to achieve at least 100° flex for ease of gait and stairs Met   Long term goals  Time Frame for Long term goals : 6 weeks  Long term goal 1: Pt will have improved KOOS score by 20% or more  Met  Long term goal 2: Pt will be able to walk w/o device at least 75% of the time w/o knee pain  Met   Long term goal 3: Pt will be able to go up/down stairs reciprocally w/o pain or limit  Progressing  New Goal: Pt will be able to jog lightly 40-50ft w/o pain or limit. New and pt goal  KOOS 54% at [facility], 22: 11%      Summary of Evaluation: Assessment: Pt is 61 yo female who presents just 8 days postop L TKA. She had R TKA 10/6/21. She ambulates w/ RW w/ good WB and mild limit to knee flex/ext L.   She has min to mod pain w/ limited AROM and strength in L LE. She will benefit from PT to help restore these deficits so she can return to normal sleep, gait and functional activity w/o pain. Prior to 2021 she was walking w/o device w/o pain and on feet all day w/ min pain. Subjective:  Jennifer Archer arrives to therapy stating there are no complaints with the knee. Only difficulty is going down stairs reciprocally, going up is okay. Any changes in Ambulatory Summary Sheet? None        Objective:  COVID screening questions were asked and patient attested that there had been no contact or symptoms        Has difficulty not putting weight on R LE during lateral heel touches, despite cueing.       Exercises: (No more than 4 columns)   Exercise/Equipment 1/20/22  #7 1/25/22  #8 1/27/2022 #9           WARM UP       Nustep       Rec bike  S 11.5 5'  S11.5, 5'  5'         TABLE  (L)      SLR  1# prox calf 10x3  1# prox calf 10x3  1# prox calf 3*10   Hip abd  1# 10x2 1# 10x2 1# 2*10   Heel slide  20x w/ strap and hold at end range 20x w/ strap and hold at end range 20* with strap hold at end range    Bridge  10x2  Legs on ball, knees straight 10x2  Legs on ball knees straight 2*10   Prone quad stretch Stand w/ strap 3x10\"  Stand w/ strap 30\"x2 Stand with strap 2*30\"             STANDING      Step up ant  6\"  20x 6\" 20x  Up and over 6\" 2*10   Lateral heel touch  4\" 10x2 ea LE  4\" 10x2  6\" 2*10   Hamstring curl  Seated GTB 10x2 cybex  Cybex, pin in first hole, dial set to 15  2*10   Calf stretch  Regular 10\"x3 ea LE -- --   Mini lunge ant     Lateral  -- Alt LE's small 10x ea LE ea dir Alt LE's small 10* ea LE ea dir    Shuttle leg press  B 4C 10x2  U 2C 10x2 ea LE B 4C 10x2  U 2C 10x2 ea LE B 4C 2*10  U 2C 2*10 ea LE         PROPRIOCEPTION      Wobble board balance  30\" ea way 60\" ea way 1' ea way    SLS  30\" ea LE 30\"x2 ea LE 2*30\" ea LE                      MODALITIES      Vaso  10' 10' 10'             Other Therapeutic Activities/Education:  12/28/2021: Patient received education on their current pathology and how their condition effects them with their functional activities. Patient understood discussion and questions were answered. Patient understands their activity limitations and understands rational for treatment progression. Home Exercise Program:  Continue current from prior hand outs      Manual Treatments:        Modalities:  Vaso to L knee, pt recumb w/ leg on pillow, 10'      Communication with other providers:  POC set for cosign 12/28/21,See PN 1/20/22       Assessment:   Galina tolerated today's exercises well. She is tolerating progressed exercises well with no reports of increased pain and she demonstrated appropriate level of muscle fatigue.  End session pain: 0/10      Plan for Next Session:  progress ROM, strength, balance to yanni, pain and edema control prn      Time In / Time Out:    1015/1103       Timed Code/Total Treatment Minutes:  38'/48' 1 vaso 10' 1 TA 15' 2 TE 23'    Next Progress Note due:  See PN 1/20/22     Plan of Care Interventions:  [x] Therapeutic Exercise  [x] Modalities:  [x] Therapeutic Activity     [] Ultrasound  [x] Estim  [x] Gait Training      [] Cervical Traction [] Lumbar Traction  [x] Neuromuscular Re-education    [x] Cold/hotpack [] Iontophoresis   [x] Instruction in HEP      [x] Vasopneumatic   [] Dry Needling    [x] Manual Therapy               [] Aquatic Therapy              Electronically signed by:  Eric Braxton PTA        1/27/2022, 9:01 AM

## 2022-02-01 ENCOUNTER — HOSPITAL ENCOUNTER (OUTPATIENT)
Dept: PHYSICAL THERAPY | Age: 57
Setting detail: THERAPIES SERIES
Discharge: HOME | End: 2022-02-01
Payer: COMMERCIAL

## 2022-02-01 PROCEDURE — 97530 THERAPEUTIC ACTIVITIES: CPT

## 2022-02-01 PROCEDURE — 97110 THERAPEUTIC EXERCISES: CPT

## 2022-02-01 PROCEDURE — 97016 VASOPNEUMATIC DEVICE THERAPY: CPT

## 2022-02-01 NOTE — FLOWSHEET NOTE
Outpatient Physical Therapy  [place]           [x] Phone: [phone]   Fax: [phone]  [place]           [] Phone: [phone]   Fax: [phone]        Physical Therapy Daily Treatment Note  Date:  2022    Patient Name:  Marie Puga    :  1965  MRN: [id number]  Restrictions/Precautions: Other position/activity restrictions: postop L TKA  Diagnosis:   Diagnosis: L TKA  Date of Injury/Surgery:  21   (R TKA 10/6/21)  Treatment Diagnosis: Treatment Diagnosis: L knee pain, stiffness, weakness, altered gait    Insurance/Certification information: PT Insurance Information: Medical [place] 20 PCY   13 used in ,   Referring Physician:  Referring Practitioner: Kelly Bob Doctor Visit:    Plan of care signed (Y/N):  yes  Outcome Measure: KOOS 54%  Visit# / total visits:   10/12 POC (1 used )  Pain level: 0/10       Goals:     Patient goals : walking and running w/o pain/limit, no device, get it bending again. Short term goals  Time Frame for Short term goals: 3 weeks  Short term goal 1: Pt will be able to report min pain w/ usual activity Met   Short term goal 2: Pt will be able to walk w/ SPC or less at least 50% of the time  Met  Short term goal 3: Pt will be able to achieve at least 100° flex for ease of gait and stairs Met   Long term goals  Time Frame for Long term goals : 6 weeks  Long term goal 1: Pt will have improved KOOS score by 20% or more  Met  Long term goal 2: Pt will be able to walk w/o device at least 75% of the time w/o knee pain  Met   Long term goal 3: Pt will be able to go up/down stairs reciprocally w/o pain or limit  Progressing  New Goal: Pt will be able to jog lightly 40-50ft w/o pain or limit. New and pt goal  KOOS 54% at [facility], 22: 11%      Summary of Evaluation: Assessment: Pt is 61 yo female who presents just 8 days postop L TKA. She had R TKA 10/6/21. She ambulates w/ RW w/ good WB and mild limit to knee flex/ext L.   She has min to mod pain w/ limited AROM and strength in L LE. She will benefit from PT to help restore these deficits so she can return to normal sleep, gait and functional activity w/o pain. Prior to 2021 she was walking w/o device w/o pain and on feet all day w/ min pain. Subjective:  No issues w/ knee but is asking about incision, more lumpy than R. Any changes in Ambulatory Summary Sheet? None        Objective:  COVID screening questions were asked and patient attested that there had been no contact or symptoms        AROM L knee 0-126°  Improved mech w/ lateral heel touch today, still some cues. Cues w/ lateral lunge to keep trial leg straight.            Exercises: (No more than 4 columns)   Exercise/Equipment 1/20/22  #7 1/25/22  #8 1/27/2022 #9 2/1/22  #10            WARM UP        Nustep        Rec bike S 11.5 S 11.5 5'  S11.5, 5'  5' 5'          TABLE  (L)       SLR  1# prox calf 10x3  1# prox calf 10x3  1# prox calf 3*10 2# prox calf 10x3    Hip abd  1# 10x2 1# 10x2 1# 2*10 2# 10x2    Heel slide  20x w/ strap and hold at end range 20x w/ strap and hold at end range 20* with strap hold at end range  10x10\" w/ strap    Bridge  10x2  Legs on ball, knees straight 10x2  Legs on ball knees straight 2*10 Legs on ball, knees straight 15x2   Prone quad stretch Stand w/ strap 3x10\"  Stand w/ strap 30\"x2 Stand with strap 2*30\"  Stand w/ strap 30\"x2 ea LE              STANDING       STS     20\" 10x2    Step up ant  6\"  20x 6\" 20x  Up and over 6\" 2*10 8\" up/over/down 20x   Lateral heel touch  4\" 10x2 ea LE  4\" 10x2  6\" 2*10 6\" 10x2    Hamstring curl  Seated GTB 10x2 cybex  Cybex, pin in first hole, dial set to 15  2*10 cybex 25# 10x2    Calf stretch  Regular 10\"x3 ea LE -- -- --   Mini lunge ant     Lateral  -- Alt LE's small 10x ea LE ea dir Alt LE's small 10* ea LE ea dir  Alt LE's small 10x ea LE ea dir   Shuttle leg press  B 4C 10x2  U 2C 10x2 ea LE B 4C 10x2  U 2C 10x2 ea LE B 4C 2*10  U 2C 2*10 ea LE B 4C 10x2  U 2C 10x2 ea LE          PROPRIOCEPTION       Wobble board balance  30\" ea way 60\" ea way 1' ea way  BOSU squat w/ UE prn 10x2    SLS  30\" ea LE 30\"x2 ea LE 2*30\" ea LE  1' ea LE                         MODALITIES       Vaso  10' 10' 10' 10'              Other Therapeutic Activities/Education:  12/28/2021: Patient received education on their current pathology and how their condition effects them with their functional activities. Patient understood discussion and questions were answered. Patient understands their activity limitations and understands rational for treatment progression. Home Exercise Program:  Continue current from prior hand outs      Manual Treatments:        Modalities:  Vaso to L knee, pt recumb w/ leg on pillow, 10'      Communication with other providers:  POC set for cosign 12/28/21,See PN 1/20/22       Assessment:   Galina tolerated today's exercises well. She is tolerating progressed exercises well with no reports of increased pain and she demonstrated appropriate level of muscle fatigue.  End session pain: 0/10      Plan for Next Session:  progress ROM, strength, balance to yanni, pain and edema control prn      Time In / Time Out:   0745/0840        Timed Code/Total Treatment Minutes:  45/55     1 TA 15' 2 TE 30'   1 vaso 10'    Next Progress Note due:  See PN 1/20/22     Plan of Care Interventions:  [x] Therapeutic Exercise  [x] Modalities:  [x] Therapeutic Activity     [] Ultrasound  [x] Estim  [x] Gait Training      [] Cervical Traction [] Lumbar Traction  [x] Neuromuscular Re-education    [x] Cold/hotpack [] Iontophoresis   [x] Instruction in HEP      [x] Vasopneumatic   [] Dry Needling    [x] Manual Therapy               [] Aquatic Therapy              Electronically signed by:  Reshma Mistry PT,  PT, MPT, ATC      2/1/2022, 8:30 AM

## 2022-02-05 ENCOUNTER — HOSPITAL ENCOUNTER (OUTPATIENT)
Dept: PHYSICAL THERAPY | Age: 57
Setting detail: THERAPIES SERIES
Discharge: HOME | End: 2022-02-05
Payer: COMMERCIAL

## 2022-02-05 PROCEDURE — 97016 VASOPNEUMATIC DEVICE THERAPY: CPT

## 2022-02-05 PROCEDURE — 97530 THERAPEUTIC ACTIVITIES: CPT

## 2022-02-05 PROCEDURE — 97110 THERAPEUTIC EXERCISES: CPT

## 2022-02-05 NOTE — FLOWSHEET NOTE
Outpatient Physical Therapy  [place]           [x] Phone: [phone]   Fax: [phone]  [place]           [] Phone: [phone]   Fax: [phone]        Physical Therapy Daily Treatment Note  Date:  2022    Patient Name:  Lazaro Mcgowan    :  1965  MRN: [id number]  Restrictions/Precautions: Other position/activity restrictions: postop L TKA  Diagnosis:   Diagnosis: L TKA  Date of Injury/Surgery:  21   (R TKA 10/6/21)  Treatment Diagnosis: Treatment Diagnosis: L knee pain, stiffness, weakness, altered gait    Insurance/Certification information: PT Insurance Information: Medical [place] 20 PCY   13 used in ,   Referring Physician:  Referring Practitioner: Violet Lucas  Next Doctor Visit:    Plan of care signed (Y/N):  yes  Outcome Measure: KOOS 54%  Visit# / total visits:    POC (1 used )  Pain level: 0/10       Goals:     Patient goals : walking and running w/o pain/limit, no device, get it bending again. Short term goals  Time Frame for Short term goals: 3 weeks  Short term goal 1: Pt will be able to report min pain w/ usual activity Met   Short term goal 2: Pt will be able to walk w/ SPC or less at least 50% of the time  Met  Short term goal 3: Pt will be able to achieve at least 100° flex for ease of gait and stairs Met   Long term goals  Time Frame for Long term goals : 6 weeks  Long term goal 1: Pt will have improved KOOS score by 20% or more  Met  Long term goal 2: Pt will be able to walk w/o device at least 75% of the time w/o knee pain  Met   Long term goal 3: Pt will be able to go up/down stairs reciprocally w/o pain or limit  Progressing  New Goal: Pt will be able to jog lightly 40-50ft w/o pain or limit. New and pt goal  KOOS 54% at [facility], 22: 11%      Summary of Evaluation: Assessment: Pt is 61 yo female who presents just 8 days postop L TKA. She had R TKA 10/6/21. She ambulates w/ RW w/ good WB and mild limit to knee flex/ext L.   She has min to mod pain w/ limited AROM and strength in L LE. She will benefit from PT to help restore these deficits so she can return to normal sleep, gait and functional activity w/o pain. Prior to 2021 she was walking w/o device w/o pain and on feet all day w/ min pain. Subjective:  Pt reports she has no complaints of pain, only complaints of stiffness. Any changes in Ambulatory Summary Sheet? None    Objective: see below     COVID screening questions were asked and patient attested that there had been no contact or symptoms        AROM L knee 0-126°  Improved mech w/ lateral heel touch today, still some cues. Cues w/ lateral lunge to keep trial leg straight.            Exercises: (No more than 4 columns)   Exercise/Equipment 1/25/22  #8 1/27/2022 #9 2/1/22  #10 2/5/22  #11            WARM UP        Nustep        Rec bike S 11.5 S11.5, 5'  5' 5' 5'          TABLE  (L)       SLR  1# prox calf 10x3  1# prox calf 3*10 2# prox calf 10x3  2# 3x10 left   Hip abd  1# 10x2 1# 2*10 2# 10x2  2# 2x10 left   Heel slide  20x w/ strap and hold at end range 20* with strap hold at end range  10x10\" w/ strap  10 x 10\" holds left   Bridge  Legs on ball, knees straight 10x2  Legs on ball knees straight 2*10 Legs on ball, knees straight 15x2 2x15 knees extended w/ feet on ball   Prone quad stretch Stand w/ strap 30\"x2 Stand with strap 2*30\"  Stand w/ strap 30\"x2 ea LE  2x30\" R/L ea  Stand w/strap             STANDING       STS    20\" 10x2  20\" 2x10   Step up ant  6\" 20x  Up and over 6\" 2*10 8\" up/over/down 20x 8\" up/over/down 1x20 left lead   Lateral heel touch  4\" 10x2  6\" 2*10 6\" 10x2  6\" 2x10 L SLS   Hamstring curl  cybex  Cybex, pin in first hole, dial set to 15  2*10 cybex 25# 10x2  CYBEX 30# 2x10 B   Calf stretch  -- -- --    Mini lunge ant     Lateral  Alt LE's small 10x ea LE ea dir Alt LE's small 10* ea LE ea dir  Alt LE's small 10x ea LE ea dir 1x10 F/S each   R/L ea/alt   Shuttle leg press  B 4C 10x2  U 2C 10x2 ea LE B 4C 2*10  U 2C 2*10 ea LE B 4C 10x2  U 2C 10x2 ea LE DLP 4C 2x10  SLP 2C R/L 2x10 ea          PROPRIOCEPTION       Wobble board balance  60\" ea way 1' ea way  BOSU squat w/ UE prn 10x2  BOSU balance 60\"   SLS  30\"x2 ea LE 2*30\" ea LE  1' ea LE  60\" R/L ea                        MODALITIES       Vaso  10' 10' 10' 10'              Other Therapeutic Activities/Education:  12/28/2021: Patient received education on their current pathology and how their condition effects them with their functional activities. Patient understood discussion and questions were answered. Patient understands their activity limitations and understands rational for treatment progression. Home Exercise Program:  Continue current from prior hand outs      Manual Treatments:        Modalities:  Vaso to L knee, pt recumb w/ leg on stool for 10' at low pressure      Communication with other providers:  POC set for cosign 12/28/21,See PN 1/20/22       Assessment:   Pt presents with no complaints of pain and is able to tolerate all listed interventions with no increased complaints of pain. Pt is tolerating progressed exercises well with no reports of increased pain and she demonstrated appropriate level of muscle fatigue.    End session pain: 0/10    Plan for Next Session:  progress ROM, strength, balance to yanni, pain and edema control prn      Time In / Time Out:   1341/1430       Timed Code/Total Treatment Minutes:  TE X 25' X 2;   TA X 14' X 1;   GR x 10' X 1      Next Progress Note due:  See PN 1/20/22       Plan of Care Interventions:  [x] Therapeutic Exercise  [x] Modalities:  [x] Therapeutic Activity     [] Ultrasound  [x] Estim  [x] Gait Training      [] Cervical Traction [] Lumbar Traction  [x] Neuromuscular Re-education    [x] Cold/hotpack [] Iontophoresis   [x] Instruction in HEP      [x] Vasopneumatic   [] Dry Needling    [x] Manual Therapy               [] Aquatic Therapy              Electronically signed by:  Ben Little II, PTA [id number]          2/5/2022, 8:04 AM

## 2022-02-08 ENCOUNTER — HOSPITAL ENCOUNTER (OUTPATIENT)
Dept: PHYSICAL THERAPY | Age: 57
Setting detail: THERAPIES SERIES
Discharge: HOME | End: 2022-02-08
Payer: COMMERCIAL

## 2022-02-08 ENCOUNTER — OFFICE VISIT (OUTPATIENT)
Dept: ORTHOPEDIC SURGERY | Age: 57
End: 2022-02-08

## 2022-02-08 VITALS
RESPIRATION RATE: 20 BRPM | BODY MASS INDEX: 29.07 KG/M2 | HEART RATE: 73 BPM | OXYGEN SATURATION: 99 % | WEIGHT: 154 LBS | HEIGHT: 61 IN

## 2022-02-08 DIAGNOSIS — Z96.652 STATUS POST LEFT KNEE REPLACEMENT: Primary | ICD-10-CM

## 2022-02-08 PROBLEM — M17.12 PRIMARY OSTEOARTHRITIS OF LEFT KNEE: Status: RESOLVED | Noted: 2021-11-23 | Resolved: 2022-02-08

## 2022-02-08 PROCEDURE — 97530 THERAPEUTIC ACTIVITIES: CPT

## 2022-02-08 PROCEDURE — 99024 POSTOP FOLLOW-UP VISIT: CPT | Performed by: ORTHOPAEDIC SURGERY

## 2022-02-08 PROCEDURE — 97110 THERAPEUTIC EXERCISES: CPT

## 2022-02-08 NOTE — PATIENT INSTRUCTIONS
Continue weight-bearing as tolerated. Continue range of motion exercises as instructed. Ice and elevate as needed. Tylenol or Motrin for pain. Follow up as needed.

## 2022-02-08 NOTE — PROGRESS NOTES
Patient returns to the office with left knee TKA, DOS: 12/20/2021 and right knee TKA, DOS: 10/06/2021. Pt stated that she has no pain today and is very pleased with both knees. Pt stated that she finished her last physical therapy appointment today for her left knee. Pt stated she is progressing and is able to be more active on her knees at this point. Pt stated she will have some general stiffness when standing for long periods of times or sitting for long periods of time. Pt stated she is able to workout the stiffness with moving around.

## 2022-02-08 NOTE — PROGRESS NOTES
2/8/2022   Chief Complaint   Patient presents with    Post-Op Check     7 wk s/p left TKA 12/20/21        History of Present Illness:                             Sydnie Fraser is a 60 y.o. female returns today for follow-up of her left total knee replacement. She has progressed well through physical therapy. She is advancing her activities nicely and is increasing her activities and returning to work. She is very pleased with her progress and feels that her knee is coming along well. Patient returns to the office with left knee TKA, DOS: 12/20/2021 and right knee TKA, DOS: 10/06/2021. Pt stated that she has no pain today and is very pleased with both knees. Pt stated that she finished her last physical therapy appointment today for her left knee. Pt stated she is progressing and is able to be more active on her knees at this point. Pt stated she will have some general stiffness when standing for long periods of times or sitting for long periods of time. Pt stated she is able to workout the stiffness with moving around. Medical History  Patient's medications, allergies, past medical, surgical, social and family histories were reviewed and updated as appropriate. Review of Systems                                            Examination:  General Exam:  Vitals: Pulse 73   Resp 20   Ht 5' 1\" (1.549 m)   Wt 154 lb (69.9 kg)   SpO2 99%   BMI 29.10 kg/m²    Physical Exam     Left Lower Extremity:    The incision is well-healed. No erythema, drainage, or induration. Minimal resolving soft tissue swelling present throughout the soft tissues of the knee. Range of motion is present from full extension to 120 degrees of flexion. Calf is soft and nontender. Negative Homans sign. Sensation and motor function is intact at the knee and ankle. Office Procedures:  No orders of the defined types were placed in this encounter. Assessment and Plan  1.  Left total knee replacement    The patient is healing well and making steady improvements in the knee with physical therapy. I have recommended continuing with rehabilitation as a home program.  The patient understands importance of continuing with regular and aggressive stretching exercises and understands how to continue advancing strengthening and endurance as a home program.    The patient will use over-the-counter medications as needed for pain. We discussed continuing to make progress with home exercise program.  She understands the process having been through knee replacement surgery on the right. She can advance her activities as tolerated. Follow-up as needed in the future if any questions or concerns.           Electronically signed by Rohan August MD on 2/8/2022 at 8:38 AM

## 2022-02-08 NOTE — DISCHARGE SUMMARY
goals and is ready to continue on her own at this time. End session pain: 0/10       Goal Status:  [] Achieved [] Partially Achieved  [] Not Achieved   Patient goals : walking and running w/o pain/limit, no device, get it bending again. Mostly met  Short term goals  Time Frame for Short term goals: 3 weeks  Short term goal 1: Pt will be able to report min pain w/ usual activity Met   Short term goal 2: Pt will be able to walk w/ SPC or less at least 50% of the time  Met  Short term goal 3: Pt will be able to achieve at least 100° flex for ease of gait and stairs Met   Long term goals  Time Frame for Long term goals : 6 weeks  Long term goal 1: Pt will have improved KOOS score by 20% or more  Met  Long term goal 2: Pt will be able to walk w/o device at least 75% of the time w/o knee pain  Met   Long term goal 3: Pt will be able to go up/down stairs reciprocally w/o pain or limit  Mostly met, down still more challenging for L knee  New Goal: Pt will be able to jog lightly 40-50ft w/o pain or limit. Too early, but knows how to start and recommendation for moderation if at all  KOOS 54% at eval, 1/20/22: 11%, 2/8/22: 4%     Frequency/Duration:  # Days per week: [] 1 day # Weeks: [] 1 week [] 4 weeks [] 8 weeks     [x] 2 days   [] 2 weeks [] 5 weeks [] 10 weeks     [] 3 days   [] 3 weeks [x] 6 weeks [] 12 weeks       Rehab Potential: [x] Excellent [x] Good [] Fair  [] Poor         Patient Status: [] Continue per initial plan of Care     [x] Patient now discharged     [] Additional visits requested, Please re-certify for additional visits:      Requested frequency/duration:  /week for weeks    If we are requesting more visits, we fully anticipate the patient's condition is expected to improve within the treatment timeframe we are requesting.     Electronically signed by:  Bobbi Mccarty, PT, PT, MPT, ATC  2/8/2022, 6:47 AM    2/8/2022, 11:04 AM   If you have any questions or concerns, please don't hesitate to call.  Thank you for your referral.

## 2022-02-08 NOTE — FLOWSHEET NOTE
Outpatient Physical Therapy  [facility]           [x] Phone: [phone]   Fax: [phone]  [facility]           [] Phone: [phone]   Fax: [phone]        Physical Therapy Daily Treatment Note  Date:  2022    Patient Name:  Paulina Romeo    :  1965  MRN: [id number]  Restrictions/Precautions: Other position/activity restrictions: postop L TKA  Diagnosis:   Diagnosis: L TKA  Date of Injury/Surgery:  21   (R TKA 10/6/21)  Treatment Diagnosis: Treatment Diagnosis: L knee pain, stiffness, weakness, altered gait    Insurance/Certification information: PT Insurance Information: [employer] [place] 20 PCY   13 used in ,   Referring Physician:  Referring Practitioner: Stephy Bob Doctor Visit:    Plan of care signed (Y/N):  yes  Outcome Measure: KOOS 54%  Visit# / total visits:    POC (1 used )  Pain level: 0/10       Goals:     Patient goals : walking and running w/o pain/limit, no device, get it bending again. Short term goals  Time Frame for Short term goals: 3 weeks  Short term goal 1: Pt will be able to report min pain w/ usual activity Met   Short term goal 2: Pt will be able to walk w/ SPC or less at least 50% of the time  Met  Short term goal 3: Pt will be able to achieve at least 100° flex for ease of gait and stairs Met   Long term goals  Time Frame for Long term goals : 6 weeks  Long term goal 1: Pt will have improved KOOS score by 20% or more  Met  Long term goal 2: Pt will be able to walk w/o device at least 75% of the time w/o knee pain  Met   Long term goal 3: Pt will be able to go up/down stairs reciprocally w/o pain or limit  Mostly met, down still more challenging for L knee  New Goal: Pt will be able to jog lightly 40-50ft w/o pain or limit.   Too early, but knows how to start and recommendation for moderation if at all  KOOS 54% at eval, 22: 11%, 22: 4%      Summary of Evaluation: Assessment: Pt is 59 yo female who presents just 8 days postop L TKA.  She had R TKA 10/6/21. She ambulates w/ RW w/ good WB and mild limit to knee flex/ext L. She has min to mod pain w/ limited AROM and strength in L LE. She will benefit from PT to help restore these deficits so she can return to normal sleep, gait and functional activity w/o pain. Prior to 2021 she was walking w/o device w/o pain and on feet all day w/ min pain. Subjective:  Pt reports she has no complaints of pain, only complaints of stiffness. Still some challenge going down stairs and squatting but overall pleased. 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 We reviewed TM and jogging etc.         Any changes in Ambulatory Summary Sheet? None        Objective: COVID screening questions were asked and patient attested that there had been no contact or symptoms        AROM L knee 0-126°  Improved mech w/ lateral heel touch today, still some cues. MMT B LE's ankle, knee, hip flex and abd all 5/5 w/o pain  Pt did well w/ new exercises today but not ready to jog yet, too soon for L knee so talked thru options and demo how to start.        Exercises: (No more than 4 columns)   Exercise/Equipment 1/27/2022 #9 2/1/22  #10 2/5/22  #11 2/8/22  #12            WARM UP        Nustep        Rec bike S 11.5 5' 5' 5' 5'          TABLE  (L)       SLR  1# prox calf 3*10 2# prox calf 10x3  2# 3x10 left Stand 4 way hip   Hip abd  1# 2*10 2# 10x2  2# 2x10 left -   Heel slide  20* with strap hold at end range  10x10\" w/ strap  10 x 10\" holds left Man 10x5\"   Bridge  Legs on ball knees straight 2*10 Legs on ball, knees straight 15x2 2x15 knees extended w/ feet on ball 2x15 knees extended w/ feet on bal   Prone quad stretch Stand with strap 2*30\"  Stand w/ strap 30\"x2 ea LE  2x30\" R/L ea  Stand w/strap 2x30\" R/L ea  Stand w/strap             STANDING       STS   20\" 10x2  20\" 2x10 10x2, 6# OH lift   Step up ant  Up and over 6\" 2*10 8\" up/over/down 20x 8\" up/over/down 1x20 left lead 8\" up/over/down 1x10 L lead and 1x10 R   Lateral heel touch  6\" 2*10 6\" 10x2  6\" 2x10 L SLS 6\" 2x10 L SLS   Hamstring curl  Cybex, pin in first hole, dial set to 15  2*10 cybex 25# 10x2  CYBEX 30# 2x10 B CYBEX 30# 2x10 B   Calf stretch  -- --     Mini lunge ant     Lateral  Alt LE's small 10* ea LE ea dir  Alt LE's small 10x ea LE ea dir 1x10 F/S each   R/L ea/alt 1x10 F/S each   R/L ea/alt   Shuttle leg press  B 4C 2*10  U 2C 2*10 ea LE B 4C 10x2  U 2C 10x2 ea LE DLP 4C 2x10  SLP 2C R/L 2x10 ea DLP 4C 2x10  SLP 2C R/L 2x10 ea   Lateral walk     GTB 50ft ea way                 PROPRIOCEPTION       Wobble board balance  1' ea way  BOSU squat w/ UE prn 10x2  BOSU balance 60\" --   SLS  2*30\" ea LE  1' ea LE  60\" R/L ea --   4 way hip     GTB  10x ea dir, R/L                 MODALITIES       Vaso  10' 10' 10' --              Other Therapeutic Activities/Education:  12/28/2021: Patient received education on their current pathology and how their condition effects them with their functional activities. Patient understood discussion and questions were answered. Patient understands their activity limitations and understands rational for treatment progression. Home Exercise Program:  Continue current from prior hand outs  Access Code: [email or web address]  URL: [email or web address]  Date: 02/08/2022  Prepared by: Nelda Adler    Exercises  Small Range Straight Leg Raise - 1 x daily - 3 x weekly - 2-3 sets - 10 reps  Sidelying Hip Abduction - 1 x daily - 3 x weekly - 2-3 sets - 10 reps  Supine Bridge - 1 x daily - 3 x weekly - 2-3 sets - 10 reps  Prone Quadriceps Stretch with Strap - 1 x daily - 3 x weekly - 1 sets - 2-3 reps - 30 seconds hold  Hooklying Hamstring Stretch with Strap - 1 x daily - 3 x weekly - 1 sets - 2-3 reps - 30 seconds hold  Gastroc Stretch on Wall - 1 x daily - 3 x weekly - 1 sets - 2-3 reps - 30 seconds hold  Heel Raise on Step - 1 x daily - 7 x weekly - 2-3 sets - 10 reps  Sit to Stand - 1 x daily - 3 x weekly - 2-3 sets - 10 reps  Step Up - 1 x daily - 3 x weekly - 2-3 sets - 10 reps  Lateral Step Up - 1 x daily - 7 x weekly - 2-3 sets - 10 reps  Lateral Step Down - 1 x daily - 3 x weekly - 2-3 sets - 10 reps  Side Stepping with Resistance at Thighs - 1 x daily - 3 x weekly - 2-3 sets - 10 reps  Seated Hamstring Curl with Anchored Resistance - 1 x daily - 3 x weekly - 2-3 sets - 10 reps  Mini Lunge - 1 x daily - 3 x weekly - 2-3 sets - 10 reps  Side Lunge Adductor Stretch - 1 x daily - 3 x weekly - 2-3 sets - 10 reps  Single Leg Stance - 1 x daily - 3 x weekly - 1 sets - 2-3 reps - 30 seconds hold  Single Leg Balance with Clock Reach - 1 x daily - 3 x weekly - 2-3 sets - 10 reps  Standing Hip Extension with Anchored Resistance - 1 x daily - 3 x weekly - 2-3 sets - 10 reps  Standing Hip Abduction with Anchored Resistance - 1 x daily - 3 x weekly - 2-3 sets - 10 reps  Standing Hip Flexion with Anchored Resistance - 1 x daily - 3 x weekly - 2-3 sets - 10 reps  Standing Hip Adduction with Anchored Resistance - 1 x daily - 3 x weekly - 2-3 sets - 10 reps      Manual Treatments:        Modalities:  Vaso to L knee, pt recumb w/ leg on stool for 10' at low pressure      Communication with other providers:  POC set for cosign 12/28/21,See PN 1/20/22       Assessment:   Pt presents with no complaints of pain and is able to tolerate all listed interventions with no increased complaints of pain. Pt is tolerating progressed exercises well with no reports of increased pain and she demonstrated appropriate level of muscle fatigue. She has met her goals and is ready to continue on her own at this time.      End session pain: 0/10    Plan for Next Session: dc      Time In / Time Out:   1005/1055        Timed Code/Total Treatment Minutes:  50/50    2 TE   1 TA           Next Progress Note due:  See PN 1/20/22, 2/8/22       Plan of Care Interventions:  [x] Therapeutic Exercise  [x] Modalities:  [x] Therapeutic Activity     [] Ultrasound  [x] Estim  [x] Gait Training      [] Cervical Traction [] Lumbar Traction  [x] Neuromuscular Re-education    [x] Cold/hotpack [] Iontophoresis   [x] Instruction in HEP      [x] Vasopneumatic   [] Dry Needling    [x] Manual Therapy               [] Aquatic Therapy              Electronically signed by:  Jeanna Teixeira, PT  PT, MPT, ATC      2/8/2022, 11:03 AM

## 2022-04-21 DIAGNOSIS — M17.12 PRIMARY OSTEOARTHRITIS OF LEFT KNEE: ICD-10-CM

## 2022-04-25 RX ORDER — CELECOXIB 200 MG/1
CAPSULE ORAL
Qty: 60 CAPSULE | Refills: 1 | Status: SHIPPED | OUTPATIENT
Start: 2022-04-25

## 2022-08-11 ENCOUNTER — OFFICE VISIT (OUTPATIENT)
Dept: ORTHOPEDIC SURGERY | Age: 57
End: 2022-08-11
Payer: COMMERCIAL

## 2022-08-11 VITALS — SYSTOLIC BLOOD PRESSURE: 153 MMHG | DIASTOLIC BLOOD PRESSURE: 91 MMHG | HEART RATE: 71 BPM

## 2022-08-11 DIAGNOSIS — Z96.652 STATUS POST LEFT KNEE REPLACEMENT: Primary | ICD-10-CM

## 2022-08-11 DIAGNOSIS — M76.892 TENDINITIS OF LEFT KNEE: ICD-10-CM

## 2022-08-11 PROCEDURE — 99214 OFFICE O/P EST MOD 30 MIN: CPT | Performed by: ORTHOPAEDIC SURGERY

## 2022-08-11 RX ORDER — CELECOXIB 200 MG/1
200 CAPSULE ORAL DAILY
Qty: 60 CAPSULE | Refills: 2 | Status: SHIPPED | OUTPATIENT
Start: 2022-08-11

## 2022-08-11 NOTE — PROGRESS NOTES
Pt. Here today with complaints of [place] knee swelling and stiffness x 4-5 months, when she is on her feet most of the day. Pt. States no pain, and no injury.

## 2022-08-11 NOTE — PROGRESS NOTES
8/11/2022   Chief Complaint   Patient presents with    Knee Problem     Pio knee stiffness and swelling        History of Present Illness:                             Milli Aleman is a 60 y.o. female who returns today for evaluation of her bilateral knee pain. She is complaining of stiffness and mild swelling especially at the end of the day after long hours of working where she stands continuously. She denies any falls or injuries. No instability or specific bony pain. Pain is generally better with rest and anti-inflammatory medications. Pt. Here today with complaints of pio knee swelling and stiffness x 4-5 months, when she is on her feet most of the day. Pt. States no pain, and no injury. Medical History  Patient's medications, allergies, past medical, surgical, social and family histories were reviewed and updated as appropriate. Review of Systems   Constitutional:  Negative for chills and fever. HENT:  Negative for congestion and sneezing. Eyes:  Negative for pain and redness. Respiratory:  Negative for chest tightness, shortness of breath and wheezing. Cardiovascular:  Negative for chest pain and palpitations. Gastrointestinal:  Negative for vomiting. Musculoskeletal:  Positive for arthralgias. Skin:  Negative for color change and rash. Neurological:  Negative for weakness and numbness. Psychiatric/Behavioral:  Negative for agitation. The patient is not nervous/anxious. Examination:  General Exam:  Vitals: BP (!) 153/91   Pulse 71    Physical Exam  Vitals and nursing note reviewed. Constitutional:       Appearance: Normal appearance. HENT:      Head: Normocephalic and atraumatic. Eyes:      Conjunctiva/sclera: Conjunctivae normal.      Pupils: Pupils are equal, round, and reactive to light. Pulmonary:      Effort: Pulmonary effort is normal.   Musculoskeletal:      Cervical back: Normal range of motion.       Right hip: Normal.      Left hip: No deformity, tenderness, bony tenderness or crepitus. Normal range of motion. Normal strength. Right knee: No swelling, deformity, effusion, erythema, ecchymosis or bony tenderness. Normal range of motion. No medial joint line or lateral joint line tenderness. No LCL laxity or MCL laxity. Normal alignment and normal patellar mobility. Comments: Left lower extremity:  Well-healed midline scar at the knee. No erythema, drainage, induration, or effusion of the knee. Range of motion is from full extension to 125 degrees of flexion with no pain during active range of motion at the extremes of mobility. Knee is well aligned and stable to stress examination through full arc of motion. Strength is 5/5 with knee flexion and extension. There is mild discomfort and tenderness during deep palpation overlying the quadriceps tendon and retinacular structures of the patella. Sensation and motor function is intact left lower extremity with no evidence of edema. No joint effusion present    Right lower extremity:  Same as left   Skin:     General: Skin is warm and dry. Neurological:      Mental Status: She is alert and oriented to person, place, and time. Psychiatric:         Mood and Affect: Mood normal.         Behavior: Behavior normal.              Office Procedures:  No orders of the defined types were placed in this encounter. Assessment and Plan  1. bilateral left worse than right knee tendinitis    2. History of bilateral total knee replacements    I reassured the patient that her knees are functioning well on my exam today with good range of motion, stability, and strength. She has no other concerning features of any types of failure of her knee replacements. We discussed the amount of activity she is performing and the likelihood that she has over strained her knees with repetitive weightbearing standing activities throughout the day.   Her exam is consistent with tendinitis adjacent to the patella and likely inflammation and swelling related to overuse. I recommended activity modification and rest.  We discussed use of anti-inflammatory medications and have given her a prescription for Celebrex today. We discussed the potential for there to be some intermittent flares of discomfort or swelling related to her activity level at this stage following her knee replacements. I expect    Muscles and tendons to continue to strengthen with increased endurance and power over the course of a few more months until she is a full year out from surgery. Follow-up at the 1 year anniversary of her surgery if there are any persistent ongoing symptoms.         Electronically signed by Ben Van MD on 8/11/2022 at 3:32 PM

## 2022-08-14 ASSESSMENT — ENCOUNTER SYMPTOMS
WHEEZING: 0
EYE PAIN: 0
EYE REDNESS: 0
SHORTNESS OF BREATH: 0
COLOR CHANGE: 0
VOMITING: 0
CHEST TIGHTNESS: 0

## 2023-01-10 ENCOUNTER — HOSPITAL ENCOUNTER (OUTPATIENT)
Dept: WOMENS IMAGING | Age: 58
Discharge: HOME | End: 2023-01-10
Payer: COMMERCIAL

## 2023-01-10 DIAGNOSIS — Z12.31 ENCOUNTER FOR SCREENING MAMMOGRAM FOR BREAST CANCER: ICD-10-CM

## 2023-01-10 PROCEDURE — 77063 BREAST TOMOSYNTHESIS BI: CPT

## 2023-02-24 ENCOUNTER — TELEPHONE (OUTPATIENT)
Dept: ORTHOPEDIC SURGERY | Age: 58
End: 2023-02-24

## 2023-07-11 ENCOUNTER — OFFICE VISIT (OUTPATIENT)
Dept: ORTHOPEDIC SURGERY | Age: 58
End: 2023-07-11
Payer: COMMERCIAL

## 2023-07-11 VITALS
DIASTOLIC BLOOD PRESSURE: 71 MMHG | HEIGHT: 61 IN | OXYGEN SATURATION: 98 % | WEIGHT: 155 LBS | HEART RATE: 78 BPM | RESPIRATION RATE: 14 BRPM | SYSTOLIC BLOOD PRESSURE: 117 MMHG | BODY MASS INDEX: 29.27 KG/M2

## 2023-07-11 DIAGNOSIS — S92.515A CLOSED NONDISPLACED FRACTURE OF PROXIMAL PHALANX OF LESSER TOE OF LEFT FOOT, INITIAL ENCOUNTER: Primary | ICD-10-CM

## 2023-07-11 PROCEDURE — 99213 OFFICE O/P EST LOW 20 MIN: CPT

## 2023-07-11 ASSESSMENT — ENCOUNTER SYMPTOMS
BACK PAIN: 0
SHORTNESS OF BREATH: 0
FACIAL SWELLING: 0
RHINORRHEA: 0
NAUSEA: 0
COUGH: 0

## 2023-11-19 DIAGNOSIS — Z96.652 STATUS POST LEFT KNEE REPLACEMENT: ICD-10-CM

## 2023-12-29 RX ORDER — CELECOXIB 200 MG/1
CAPSULE ORAL
Qty: 30 CAPSULE | OUTPATIENT
Start: 2023-12-29

## 2024-02-13 ENCOUNTER — HOSPITAL ENCOUNTER (OUTPATIENT)
Dept: WOMENS IMAGING | Age: 59
Discharge: HOME | End: 2024-02-13
Payer: COMMERCIAL

## 2024-02-13 VITALS — HEIGHT: 61 IN | WEIGHT: 156 LBS | BODY MASS INDEX: 29.45 KG/M2

## 2024-02-13 DIAGNOSIS — Z12.31 ENCOUNTER FOR SCREENING MAMMOGRAM FOR MALIGNANT NEOPLASM OF BREAST: ICD-10-CM

## 2024-02-13 PROCEDURE — 77063 BREAST TOMOSYNTHESIS BI: CPT

## 2024-04-25 ENCOUNTER — OFFICE VISIT (OUTPATIENT)
Dept: ORTHOPEDIC SURGERY | Age: 59
End: 2024-04-25

## 2024-04-25 VITALS — RESPIRATION RATE: 17 BRPM | TEMPERATURE: 97.6 F | OXYGEN SATURATION: 98 % | HEART RATE: 90 BPM

## 2024-04-25 DIAGNOSIS — M19.011 OSTEOARTHRITIS, LOCALIZED, SHOULDER, RIGHT: ICD-10-CM

## 2024-04-25 DIAGNOSIS — S46.911A RIGHT SHOULDER STRAIN, INITIAL ENCOUNTER: ICD-10-CM

## 2024-04-25 DIAGNOSIS — M25.511 RIGHT SHOULDER PAIN, UNSPECIFIED CHRONICITY: Primary | ICD-10-CM

## 2024-04-25 RX ORDER — TRIAMCINOLONE ACETONIDE 40 MG/ML
40 INJECTION, SUSPENSION INTRA-ARTICULAR; INTRAMUSCULAR ONCE
Status: COMPLETED | OUTPATIENT
Start: 2024-04-25 | End: 2024-04-25

## 2024-04-25 RX ADMIN — TRIAMCINOLONE ACETONIDE 40 MG: 40 INJECTION, SUSPENSION INTRA-ARTICULAR; INTRAMUSCULAR at 09:30

## 2024-04-25 NOTE — PROGRESS NOTES
Patient seen in office today for: Right shoulder pain, pain radiates down into arm but not quite to the elbow. Patient states she can feel pulling in her deltoid. No tingling or numbness present at this time. Decreased ROM.     Patient reports 7/10 pain.  RICE and medication are effective to alleviate pain and reduce swelling.     Pain worsened by: Patient reports painful ROM & weight bearing.     Xrays performed in office today.     Profession: [employer]     Right handed

## 2024-04-25 NOTE — PATIENT INSTRUCTIONS
Continue weight-bearing as tolerated.  Continue range of motion exercises as instructed.  Ice and elevate as needed.  Tylenol or Motrin for pain.  Injection given in right shoulder  See attached shoulder exercises  Start taking Celebrex  Follow up in 6 weeks

## 2024-04-30 RX ORDER — CELECOXIB 200 MG/1
200 CAPSULE ORAL DAILY
Qty: 60 CAPSULE | Refills: 3 | Status: SHIPPED | OUTPATIENT
Start: 2024-04-30

## 2024-04-30 NOTE — PROGRESS NOTES
4/25/2024   Chief Complaint   Patient presents with    Consultation    Shoulder Pain     Right         History of Present Illness:                             Galina Richey is a 58 y.o. female who presents today for evaluation of her right shoulder pain.  Symptoms have been present for about a month.  Symptoms are worse with reaching out away from body lifting heavy objects.  Pain is most significant at the superior lateral aspects of her arm.  Pain is constant throughout the day and worse with repetitive activities.  She does have some discomfort while lying on that side at night.    Patient seen in office today for: Right shoulder pain, pain radiates down into arm but not quite to the elbow. Patient states she can feel pulling in her deltoid. No tingling or numbness present at this time. Decreased ROM.      Patient reports 7/10 pain.  RICE and medication are effective to alleviate pain and reduce swelling.      Pain worsened by: Patient reports painful ROM & weight bearing.      Xrays performed in office today.      Profession: [employer]      Right handed            Medical History  Patient's medications, allergies, past medical, surgical, social and family histories were reviewed and updated as appropriate.    Past Medical History:   Diagnosis Date    Arthritis     Arthritis of knee, left      Past Surgical History:   Procedure Laterality Date    JOINT REPLACEMENT      scheduled 10/6/21 for right knee    TOTAL KNEE ARTHROPLASTY Right 10/06/2021    RIGHT KNEE TOTAL ARTHROPLASTY ROBOTIC performed by Singh Brennan MD at [facility] OR    TOTAL KNEE ARTHROPLASTY Left 12/20/2021    LEFT KNEE TOTAL ARTHROPLASTY ROBOTIC performed by Singh Brennan MD at [facility] OR     Family History   Problem Relation Age of Onset    Breast Cancer Neg Hx     Ovarian Cancer Neg Hx      Social History     Socioeconomic History    Marital status:      Spouse name: Leon Richey    Number of children: 2    Years of education: 16

## 2024-05-04 ASSESSMENT — ENCOUNTER SYMPTOMS
WHEEZING: 0
SHORTNESS OF BREATH: 0
EYE PAIN: 0
CHEST TIGHTNESS: 0
VOMITING: 0
EYE REDNESS: 0
COLOR CHANGE: 0

## 2024-06-06 ENCOUNTER — OFFICE VISIT (OUTPATIENT)
Dept: ORTHOPEDIC SURGERY | Age: 59
End: 2024-06-06
Payer: COMMERCIAL

## 2024-06-06 VITALS — TEMPERATURE: 98 F | HEART RATE: 73 BPM | OXYGEN SATURATION: 99 % | RESPIRATION RATE: 19 BRPM

## 2024-06-06 DIAGNOSIS — M19.011 OSTEOARTHRITIS, LOCALIZED, SHOULDER, RIGHT: Primary | ICD-10-CM

## 2024-06-06 DIAGNOSIS — S46.911A RIGHT SHOULDER STRAIN, INITIAL ENCOUNTER: ICD-10-CM

## 2024-06-06 PROCEDURE — 99213 OFFICE O/P EST LOW 20 MIN: CPT | Performed by: ORTHOPAEDIC SURGERY

## 2024-06-06 RX ORDER — CELECOXIB 200 MG/1
200 CAPSULE ORAL DAILY
Qty: 30 CAPSULE | Refills: 2 | Status: SHIPPED | OUTPATIENT
Start: 2024-06-06

## 2024-06-06 ASSESSMENT — ENCOUNTER SYMPTOMS
CHEST TIGHTNESS: 0
COLOR CHANGE: 0
SHORTNESS OF BREATH: 0

## 2024-06-06 NOTE — PROGRESS NOTES
Patient seen in office today for: right shoulder pain, located in the lateral humerus    Date of last injection: 4/25/2024, effective for about 4 weeks     Patient reports 6/10 pain.  RICE and medication are effective to alleviate pain and reduce swelling.     Pain worsened by: Patient reports painful ROM & weight bearing.     Profession: [employer]    Right handed

## 2024-06-06 NOTE — PROGRESS NOTES
6/6/2024   Chief Complaint   Patient presents with    Follow-up    Shoulder Pain     Right         History of Present Illness:                             Galina Richey is a 58 y.o. female returns today for follow-up of her right shoulder.  The injection provided some temporary pain relief but is subsequently worn off and only lasted a few weeks.  She has continued to have aching soreness along the lateral aspect of her arm and shoulder region which is worse with lifting pushing pulling and repetitive activities at work.  Pain is generally better with rest and avoidance of strenuous activity.  She has occasional clicking sensations but no severe popping or instability.    Patient seen in office today for: right shoulder pain, located in the lateral humerus     Date of last injection: 4/25/2024, effective for about 4 weeks      Patient reports 6/10 pain.  RICE and medication are effective to alleviate pain and reduce swelling.      Pain worsened by: Patient reports painful ROM & weight bearing.      Profession: [employer]     Right handed        Medical History  Patient's medications, allergies, past medical, surgical, social and family histories were reviewed and updated as appropriate.      Review of Systems   Constitutional:  Negative for activity change and fever.   Respiratory:  Negative for chest tightness and shortness of breath.    Cardiovascular:  Negative for chest pain.   Skin:  Negative for color change.   Neurological:  Negative for dizziness.   Psychiatric/Behavioral:  The patient is not nervous/anxious.                                                Examination:  General Exam:  Vitals: Pulse 73   Temp 98 °F (36.7 °C)   Resp 19   SpO2 99%    Physical Exam     Right Upper Extremity:     There is mild global tenderness throughout the shoulder most significant at the lateral aspect.  There is mildly restricted range of motion of the shoulder with pain at the extremes of forward elevation and

## 2024-06-06 NOTE — PATIENT INSTRUCTIONS
Continue weight-bearing as tolerated.  Continue range of motion exercises as instructed.  Ice and elevate as needed.  Tylenol or Motrin for pain.  Central scheduling [phone] will be calling you to schedule your MRI.  If you do not hear from them with in a week give them a call.   Follow up in 3 weeks to discuss the results of the MRI ordered.

## 2024-06-15 ENCOUNTER — HOSPITAL ENCOUNTER (OUTPATIENT)
Dept: MRI IMAGING | Age: 59
Discharge: HOME | End: 2024-06-15
Attending: ORTHOPAEDIC SURGERY
Payer: COMMERCIAL

## 2024-06-15 DIAGNOSIS — S46.911A RIGHT SHOULDER STRAIN, INITIAL ENCOUNTER: ICD-10-CM

## 2024-06-15 DIAGNOSIS — M19.011 OSTEOARTHRITIS, LOCALIZED, SHOULDER, RIGHT: ICD-10-CM

## 2024-06-15 PROCEDURE — 73221 MRI JOINT UPR EXTREM W/O DYE: CPT

## 2024-06-25 ENCOUNTER — OFFICE VISIT (OUTPATIENT)
Dept: ORTHOPEDIC SURGERY | Age: 59
End: 2024-06-25
Payer: COMMERCIAL

## 2024-06-25 DIAGNOSIS — M19.011 OSTEOARTHRITIS, LOCALIZED, SHOULDER, RIGHT: ICD-10-CM

## 2024-06-25 DIAGNOSIS — M75.41 IMPINGEMENT SYNDROME, SHOULDER, RIGHT: ICD-10-CM

## 2024-06-25 DIAGNOSIS — S43.421A SPRAIN OF RIGHT ROTATOR CUFF CAPSULE, INITIAL ENCOUNTER: Primary | ICD-10-CM

## 2024-06-25 PROCEDURE — 99214 OFFICE O/P EST MOD 30 MIN: CPT | Performed by: ORTHOPAEDIC SURGERY

## 2024-06-25 NOTE — PATIENT INSTRUCTIONS
We will schedule surgery at soonest convenience. If you have any questions regarding your surgery please call our office and ask to speak with Jocelyn [phone]

## 2024-06-26 PROBLEM — M75.41 IMPINGEMENT SYNDROME, SHOULDER, RIGHT: Status: ACTIVE | Noted: 2024-06-26

## 2024-06-26 PROBLEM — S43.421A SPRAIN OF RIGHT ROTATOR CUFF CAPSULE: Status: ACTIVE | Noted: 2024-06-26

## 2024-06-26 ASSESSMENT — ENCOUNTER SYMPTOMS
VOMITING: 0
COLOR CHANGE: 0
CHEST TIGHTNESS: 0
SHORTNESS OF BREATH: 0
WHEEZING: 0
EYE PAIN: 0
EYE REDNESS: 0

## 2024-06-26 NOTE — PROGRESS NOTES
Right shoulder, MRI FU     IMPRESSION:  1. Full-thickness tear of the supraspinatus tendon  2. Moderate to severe degenerative changes detailed above. Irregularity of the  labrum with thickening of the inferior glenohumeral ligament. If there is  concern for underlying labral tear MRI arthrogram suggested.  3. Mild long head of the biceps tendinopathy  4. 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 Please see above.     
Negative for vomiting.   Musculoskeletal:  Positive for arthralgias.   Skin:  Negative for color change and rash.   Neurological:  Negative for weakness and numbness.   Psychiatric/Behavioral:  Negative for agitation. The patient is not nervous/anxious.                                                Examination:  General Exam:  Vitals: There were no vitals taken for this visit.   Physical Exam  Vitals and nursing note reviewed.   Constitutional:       Appearance: Normal appearance.   HENT:      Head: Normocephalic and atraumatic.   Eyes:      Conjunctiva/sclera: Conjunctivae normal.      Pupils: Pupils are equal, round, and reactive to light.   Pulmonary:      Effort: Pulmonary effort is normal.   Musculoskeletal:      Left shoulder: No swelling, deformity, laceration, tenderness or bony tenderness. Normal range of motion. Normal strength. Normal pulse.      Right elbow: No swelling, deformity, effusion or lacerations. Normal range of motion. No tenderness.      Left elbow: Normal.      Cervical back: Normal range of motion.      Comments: Right Upper Extremity:     There is mild global tenderness throughout the shoulder most significant at the lateral aspect with moderate tenderness extending down the lateral aspect of the arm and deltoid.  There is mildly restricted range of motion of the shoulder with pain at the extremes of forward elevation and abduction.  Forward elevation 140 degrees, abduction 120 degrees, internal rotation to L2, external rotation 30 degrees.  Strength is 4+/5 with rotator cuff testing but there is breakaway due to pain.  Positive empty can sign.  Negative drop arm sign.  Positive Neer sign.  Positive Bryan sign.  Mild pain at the acromioclavicular joint with crossarm test.  No crepitation.  Mild clicking at the shoulder with overhead rotational motion.  Skin is intact.  Sensation is intact throughout the upper extremity.  No instability with passive motion of the shoulder.  2+ radial

## 2024-07-03 ENCOUNTER — TELEPHONE (OUTPATIENT)
Dept: ORTHOPEDIC SURGERY | Age: 59
End: 2024-07-03

## 2024-07-03 NOTE — TELEPHONE ENCOUNTER
Patient walked into office with questions about surgery. She is wanting to schedule her RTCR surgery in September but would like to get an injection to help until then. She is hoping to get the injection next week, is this okay to schedule ?  Please advise. Thanks

## 2024-07-29 ENCOUNTER — PREP FOR PROCEDURE (OUTPATIENT)
Dept: ORTHOPEDIC SURGERY | Age: 59
End: 2024-07-29

## 2024-07-29 ENCOUNTER — TELEPHONE (OUTPATIENT)
Dept: ORTHOPEDIC SURGERY | Age: 59
End: 2024-07-29

## 2024-07-29 DIAGNOSIS — M75.41 IMPINGEMENT SYNDROME OF RIGHT SHOULDER: ICD-10-CM

## 2024-07-29 DIAGNOSIS — Z01.818 PREOPERATIVE TESTING: Primary | ICD-10-CM

## 2024-07-29 DIAGNOSIS — M19.011 PRIMARY OSTEOARTHRITIS OF RIGHT SHOULDER: ICD-10-CM

## 2024-07-29 NOTE — TELEPHONE ENCOUNTER
Scheduled patient for:    Right Shoulder Rotator Cuff Repair and Subacromial Decompression  CPT: 73398; 88301  ICD 10: M19.011; M75.41; S43.421A  Surgery Date: 8/28/2024  Surgeon: Anu  Facility: [facility]  Anesthesia: General/Nerve Block  Product: Arthrex    Clearances sent to:   PCP: Kapil    Physical Therapy: [facility]    Insurance: [employer] [place]  Prior auth started on 7/19/2024 via [employer] online portal, Clinicals uploaded.  Received approval letter in mail on 7/29/2024  Auth# [id number]  8/28/24-2/24/25

## 2024-07-30 RX ORDER — SODIUM CHLORIDE 0.9 % (FLUSH) 0.9 %
5-40 SYRINGE (ML) INJECTION EVERY 12 HOURS SCHEDULED
Status: CANCELLED | OUTPATIENT
Start: 2024-07-30

## 2024-07-30 RX ORDER — SODIUM CHLORIDE 0.9 % (FLUSH) 0.9 %
5-40 SYRINGE (ML) INJECTION PRN
Status: CANCELLED | OUTPATIENT
Start: 2024-07-30

## 2024-07-30 RX ORDER — SODIUM CHLORIDE 9 MG/ML
INJECTION, SOLUTION INTRAVENOUS PRN
Status: CANCELLED | OUTPATIENT
Start: 2024-07-30

## 2024-08-13 NOTE — PROGRESS NOTES
8/13/24 - Patient called stating she will be out of town the weekend before her surgery (8/28/24). She will come in 8/19/24 and have all necessary testing done, we will call her after that for PAT.

## 2024-08-19 ENCOUNTER — HOSPITAL ENCOUNTER (OUTPATIENT)
Age: 59
Discharge: HOME | End: 2024-08-19
Payer: COMMERCIAL

## 2024-08-19 ENCOUNTER — HOSPITAL ENCOUNTER (OUTPATIENT)
Dept: GENERAL RADIOLOGY | Age: 59
Discharge: HOME | End: 2024-08-19
Payer: COMMERCIAL

## 2024-08-19 ENCOUNTER — HOSPITAL ENCOUNTER (OUTPATIENT)
Age: 59
Discharge: HOME | End: 2024-08-21
Payer: COMMERCIAL

## 2024-08-19 DIAGNOSIS — Z01.818 PREOPERATIVE TESTING: ICD-10-CM

## 2024-08-19 LAB
ALBUMIN SERPL-MCNC: 4.3 GM/DL (ref 3.4–5)
ALP BLD-CCNC: 89 IU/L (ref 40–129)
ALT SERPL-CCNC: 28 U/L (ref 10–40)
ANION GAP SERPL CALCULATED.3IONS-SCNC: 9 MMOL/L (ref 7–16)
AST SERPL-CCNC: 24 IU/L (ref 15–37)
BASOPHILS ABSOLUTE: 0.1 K/CU MM
BASOPHILS RELATIVE PERCENT: 1.2 % (ref 0–1)
BILIRUB SERPL-MCNC: 0.2 MG/DL (ref 0–1)
BILIRUBIN, URINE: NEGATIVE MG/DL
BLOOD, URINE: NEGATIVE
BUN SERPL-MCNC: 9 MG/DL (ref 6–23)
CALCIUM SERPL-MCNC: 9.6 MG/DL (ref 8.3–10.6)
CHLORIDE BLD-SCNC: 106 MMOL/L (ref 99–110)
CLARITY, UA: CLEAR
CO2: 25 MMOL/L (ref 21–32)
COLOR, UA: YELLOW
COMMENT UA: NORMAL
CREAT SERPL-MCNC: 0.8 MG/DL (ref 0.6–1.1)
DIFFERENTIAL TYPE: ABNORMAL
EKG ATRIAL RATE: 64 BPM
EKG DIAGNOSIS: NORMAL
EKG P AXIS: 55 DEGREES
EKG P-R INTERVAL: 136 MS
EKG Q-T INTERVAL: 394 MS
EKG QRS DURATION: 84 MS
EKG QTC CALCULATION (BAZETT): 406 MS
EKG R AXIS: 6 DEGREES
EKG T AXIS: 51 DEGREES
EKG VENTRICULAR RATE: 64 BPM
EOSINOPHILS ABSOLUTE: 0.2 K/CU MM
EOSINOPHILS RELATIVE PERCENT: 4.6 % (ref 0–3)
GFR, ESTIMATED: 84 ML/MIN/1.73M2
GLUCOSE SERPL-MCNC: 106 MG/DL (ref 70–99)
GLUCOSE URINE: NEGATIVE MG/DL
HCT VFR BLD CALC: 38.8 % (ref 37–47)
HEMOGLOBIN: 12.7 GM/DL (ref 12.5–16)
IMMATURE NEUTROPHIL %: 0.2 % (ref 0–0.43)
KETONES, URINE: NEGATIVE MG/DL
LEUKOCYTE ESTERASE, URINE: NEGATIVE
LYMPHOCYTES ABSOLUTE: 1.3 K/CU MM
LYMPHOCYTES RELATIVE PERCENT: 29.3 % (ref 24–44)
MCH RBC QN AUTO: 27.4 PG (ref 27–31)
MCHC RBC AUTO-ENTMCNC: 32.7 % (ref 32–36)
MCV RBC AUTO: 83.8 FL (ref 78–100)
MONOCYTES ABSOLUTE: 0.5 K/CU MM
MONOCYTES RELATIVE PERCENT: 11.1 % (ref 0–4)
NEUTROPHILS ABSOLUTE: 2.3 K/CU MM
NEUTROPHILS RELATIVE PERCENT: 53.6 % (ref 36–66)
NITRITE URINE, QUANTITATIVE: NEGATIVE
NUCLEATED RBC %: 0 %
PDW BLD-RTO: 13 % (ref 11.7–14.9)
PH, URINE: 6 (ref 5–8)
PLATELET # BLD: 255 K/CU MM (ref 140–440)
PMV BLD AUTO: 10.4 FL (ref 7.5–11.1)
POTASSIUM SERPL-SCNC: 4.5 MMOL/L (ref 3.5–5.1)
PROTEIN UA: NEGATIVE MG/DL
RBC # BLD: 4.63 M/CU MM (ref 4.2–5.4)
SODIUM BLD-SCNC: 140 MMOL/L (ref 135–145)
SPECIFIC GRAVITY UA: >1.03 (ref 1–1.03)
TOTAL IMMATURE NEUTOROPHIL: 0.01 K/CU MM
TOTAL NUCLEATED RBC: 0 K/CU MM
TOTAL PROTEIN: 6.5 GM/DL (ref 6.4–8.2)
UROBILINOGEN, URINE: 0.2 MG/DL (ref 0.2–1)
WBC # BLD: 4.3 K/CU MM (ref 4–10.5)

## 2024-08-19 PROCEDURE — 36415 COLL VENOUS BLD VENIPUNCTURE: CPT

## 2024-08-19 PROCEDURE — 87086 URINE CULTURE/COLONY COUNT: CPT

## 2024-08-19 PROCEDURE — 93005 ELECTROCARDIOGRAM TRACING: CPT

## 2024-08-19 PROCEDURE — 81003 URINALYSIS AUTO W/O SCOPE: CPT

## 2024-08-19 PROCEDURE — 93010 ELECTROCARDIOGRAM REPORT: CPT | Performed by: INTERNAL MEDICINE

## 2024-08-19 PROCEDURE — 71046 X-RAY EXAM CHEST 2 VIEWS: CPT

## 2024-08-19 PROCEDURE — 85025 COMPLETE CBC W/AUTO DIFF WBC: CPT

## 2024-08-19 PROCEDURE — 80053 COMPREHEN METABOLIC PANEL: CPT

## 2024-08-20 LAB
CULTURE: NORMAL
Lab: NORMAL
SPECIMEN: NORMAL

## 2024-08-20 NOTE — PROGRESS NOTES
8/20/24 - .LM with my call-back # concerning  surgery @ [facility] on  8/28/24.  Please call the PAT Nurse for a phone assessment and surgery instructions.

## 2024-08-21 NOTE — PROGRESS NOTES
.Surgery @ [facility] on 8/28/24 you will be called 8/27/24 with times    NOTHING TO EAT OR DRINK AFTER MIDNIGHT DAY OF SURGERY    1. Enter thru the hospital main entrance on day of surgery, check in at the Information Desk. If you arrive prior to 6:00am, enter thru the ER entrance.    2. Follow the directions as prescribed by the doctor for your procedure and medications.         Morning of surgery take:  No medications         Stop vitamins, supplements and NSAIDS:  8/21/24    3. Check with your Doctor regarding stopping blood thinners and follow their instructions.    4. Do not smoke, vape or use chewing tobacco morning of surgery. Do not drink any alcoholic beverages 24 hours prior to surgery.       This includes NA Beer. No street drugs 7 days prior to surgery.    5. If you have dentures, contacts of glasses they will be removed before going to the OR; please bring a case.    6. Please bring picture ID, insurance card, paperwork from the doctor’s office (H & P, Consent, & card for implantable devices).    7. Take a shower with an antibacterial soap the night before surgery and the morning of surgery. Do not put anything on your skin      After your morning shower.    8. You will need a responsible adult to drive you home and check on you after surgery.

## 2024-08-27 ENCOUNTER — ANESTHESIA EVENT (OUTPATIENT)
Dept: OPERATING ROOM | Age: 59
End: 2024-08-27
Payer: COMMERCIAL

## 2024-08-27 ASSESSMENT — LIFESTYLE VARIABLES: SMOKING_STATUS: 1

## 2024-08-27 NOTE — ANESTHESIA PRE PROCEDURE
Department of Anesthesiology  Preprocedure Note       Name:  Galina Richey   Age:  58 y.o.  :  1965                                          MRN:  [id number]         Date:  2024      Surgeon: Surgeon(s):  Singh Brennan MD    Procedure: Procedure(s):  RIGHT SHOULDER ARTHROSCOPY ROTATOR CUFF REPAIR  RIGHT SHOULDER ARTHROSCOPY ACROMIOPLASTY    Medications prior to admission:   Prior to Admission medications    Medication Sig Start Date End Date Taking? Authorizing Provider   celecoxib (CELEBREX) 200 MG capsule Take 1 capsule by mouth daily 24   Singh Brennan MD   celecoxib (CELEBREX) 200 MG capsule Take 1 capsule by mouth daily 24   Singh Brennan MD   celecoxib (CELEBREX) 200 MG capsule Take 1 capsule by mouth in the morning. 22   Singh Brennan MD   celecoxib (CELEBREX) 200 MG capsule TAKE ONE CAPSULE BY MOUTH TWICE A DAY  Patient not taking: Reported on 2023   Singh Brennan MD   aspirin 325 MG EC tablet Take 1 tablet by mouth 2 times daily  Patient not taking: Reported on 21   Singh Brennan MD   docusate sodium (COLACE) 100 MG capsule Take 1 capsule by mouth 2 times daily as needed for Constipation  Patient not taking: Reported on 21   Singh Brennan MD   ferrous sulfate (IRON 325) 325 (65 Fe) MG tablet Take 325 mg by mouth daily (with breakfast)  Patient not taking: Reported on 2022    Obdulia Em MD   calcium carbonate (OYSTER SHELL CALCIUM 500 MG) 1250 (500 Ca) MG tablet Take by mouth    Obdulia Em MD   Cholecalciferol (VITAMIN D3) 1.25 MG (04241 UT) CAPS TAKE ONE CAPSULE BY MOUTH ONCE A WEEK 21   Obdulia Em MD       Current medications:    No current facility-administered medications for this encounter.     Current Outpatient Medications   Medication Sig Dispense Refill   • celecoxib (CELEBREX) 200 MG capsule Take 1 capsule by mouth daily 30 capsule 2   • celecoxib (CELEBREX) 200 MG capsule

## 2024-08-27 NOTE — PROGRESS NOTES
8/27/24 - .Notified patient surgery @ [facility] on  8/28/24 @ 0730, arrival 0600. NPO status and morning medications reviewed. Understanding verbalized.

## 2024-08-28 ENCOUNTER — ANESTHESIA (OUTPATIENT)
Dept: OPERATING ROOM | Age: 59
End: 2024-08-28
Payer: COMMERCIAL

## 2024-08-28 ENCOUNTER — HOSPITAL ENCOUNTER (OUTPATIENT)
Age: 59
Setting detail: OUTPATIENT SURGERY
Discharge: HOME | End: 2024-08-28
Attending: ORTHOPAEDIC SURGERY | Admitting: ORTHOPAEDIC SURGERY
Payer: COMMERCIAL

## 2024-08-28 VITALS
HEART RATE: 84 BPM | RESPIRATION RATE: 16 BRPM | TEMPERATURE: 96.8 F | DIASTOLIC BLOOD PRESSURE: 72 MMHG | OXYGEN SATURATION: 96 % | BODY MASS INDEX: 30.21 KG/M2 | SYSTOLIC BLOOD PRESSURE: 129 MMHG | HEIGHT: 61 IN | WEIGHT: 160 LBS

## 2024-08-28 DIAGNOSIS — M17.12 PRIMARY OSTEOARTHRITIS OF LEFT KNEE: ICD-10-CM

## 2024-08-28 DIAGNOSIS — M75.41 IMPINGEMENT SYNDROME, SHOULDER, RIGHT: ICD-10-CM

## 2024-08-28 DIAGNOSIS — S43.421A SPRAIN OF RIGHT ROTATOR CUFF CAPSULE, INITIAL ENCOUNTER: Primary | ICD-10-CM

## 2024-08-28 PROBLEM — M25.811 IMPINGEMENT OF RIGHT SHOULDER: Status: ACTIVE | Noted: 2024-08-28

## 2024-08-28 PROCEDURE — 6360000002 HC RX W HCPCS: Performed by: ORTHOPAEDIC SURGERY

## 2024-08-28 PROCEDURE — 2720000010 HC SURG SUPPLY STERILE: Performed by: ORTHOPAEDIC SURGERY

## 2024-08-28 PROCEDURE — 7100000011 HC PHASE II RECOVERY - ADDTL 15 MIN: Performed by: ORTHOPAEDIC SURGERY

## 2024-08-28 PROCEDURE — 6360000002 HC RX W HCPCS: Performed by: NURSE ANESTHETIST, CERTIFIED REGISTERED

## 2024-08-28 PROCEDURE — 7100000001 HC PACU RECOVERY - ADDTL 15 MIN: Performed by: ORTHOPAEDIC SURGERY

## 2024-08-28 PROCEDURE — 2580000003 HC RX 258: Performed by: ORTHOPAEDIC SURGERY

## 2024-08-28 PROCEDURE — 3700000001 HC ADD 15 MINUTES (ANESTHESIA): Performed by: ORTHOPAEDIC SURGERY

## 2024-08-28 PROCEDURE — C1713 ANCHOR/SCREW BN/BN,TIS/BN: HCPCS | Performed by: ORTHOPAEDIC SURGERY

## 2024-08-28 PROCEDURE — 6370000000 HC RX 637 (ALT 250 FOR IP): Performed by: ANESTHESIOLOGY

## 2024-08-28 PROCEDURE — 7100000000 HC PACU RECOVERY - FIRST 15 MIN: Performed by: ORTHOPAEDIC SURGERY

## 2024-08-28 PROCEDURE — 64415 NJX AA&/STRD BRCH PLXS IMG: CPT | Performed by: ANESTHESIOLOGY

## 2024-08-28 PROCEDURE — 6360000002 HC RX W HCPCS: Performed by: ANESTHESIOLOGY

## 2024-08-28 PROCEDURE — 7100000010 HC PHASE II RECOVERY - FIRST 15 MIN: Performed by: ORTHOPAEDIC SURGERY

## 2024-08-28 PROCEDURE — 3700000000 HC ANESTHESIA ATTENDED CARE: Performed by: ORTHOPAEDIC SURGERY

## 2024-08-28 PROCEDURE — L3650 SO 8 ABD RESTRAINT PRE OTS: HCPCS | Performed by: ORTHOPAEDIC SURGERY

## 2024-08-28 PROCEDURE — 3600000005 HC SURGERY LEVEL 5 BASE: Performed by: ORTHOPAEDIC SURGERY

## 2024-08-28 PROCEDURE — 3600000015 HC SURGERY LEVEL 5 ADDTL 15MIN: Performed by: ORTHOPAEDIC SURGERY

## 2024-08-28 PROCEDURE — C9290 INJ, BUPIVACAINE LIPOSOME: HCPCS | Performed by: NURSE ANESTHETIST, CERTIFIED REGISTERED

## 2024-08-28 PROCEDURE — 2709999900 HC NON-CHARGEABLE SUPPLY: Performed by: ORTHOPAEDIC SURGERY

## 2024-08-28 PROCEDURE — 2500000003 HC RX 250 WO HCPCS: Performed by: NURSE ANESTHETIST, CERTIFIED REGISTERED

## 2024-08-28 DEVICE — ANCHOR SUTURE BIOCOMP 4.75X19.1 MM SWIVELOCK C: Type: IMPLANTABLE DEVICE | Site: HUMERUS | Status: FUNCTIONAL

## 2024-08-28 DEVICE — ANCHOR SUTURE L 24.5 MM DIA 4.75 MM SUTURE SZ 2 B-TCP/ PEEK: Type: IMPLANTABLE DEVICE | Site: HUMERUS | Status: FUNCTIONAL

## 2024-08-28 RX ORDER — FENTANYL CITRATE 50 UG/ML
INJECTION, SOLUTION INTRAMUSCULAR; INTRAVENOUS PRN
Status: DISCONTINUED | OUTPATIENT
Start: 2024-08-28 | End: 2024-08-28 | Stop reason: SDUPTHER

## 2024-08-28 RX ORDER — SODIUM CHLORIDE 9 MG/ML
INJECTION, SOLUTION INTRAVENOUS PRN
Status: DISCONTINUED | OUTPATIENT
Start: 2024-08-28 | End: 2024-08-28 | Stop reason: HOSPADM

## 2024-08-28 RX ORDER — MEPERIDINE HYDROCHLORIDE 25 MG/ML
12.5 INJECTION INTRAMUSCULAR; INTRAVENOUS; SUBCUTANEOUS EVERY 5 MIN PRN
Status: DISCONTINUED | OUTPATIENT
Start: 2024-08-28 | End: 2024-08-28 | Stop reason: HOSPADM

## 2024-08-28 RX ORDER — FENTANYL CITRATE 50 UG/ML
50 INJECTION, SOLUTION INTRAMUSCULAR; INTRAVENOUS EVERY 5 MIN PRN
Status: DISCONTINUED | OUTPATIENT
Start: 2024-08-28 | End: 2024-08-28 | Stop reason: HOSPADM

## 2024-08-28 RX ORDER — DEXAMETHASONE SODIUM PHOSPHATE 4 MG/ML
INJECTION, SOLUTION INTRA-ARTICULAR; INTRALESIONAL; INTRAMUSCULAR; INTRAVENOUS; SOFT TISSUE PRN
Status: DISCONTINUED | OUTPATIENT
Start: 2024-08-28 | End: 2024-08-28 | Stop reason: SDUPTHER

## 2024-08-28 RX ORDER — SODIUM CHLORIDE 0.9 % (FLUSH) 0.9 %
5-40 SYRINGE (ML) INJECTION EVERY 12 HOURS SCHEDULED
Status: DISCONTINUED | OUTPATIENT
Start: 2024-08-28 | End: 2024-08-28 | Stop reason: HOSPADM

## 2024-08-28 RX ORDER — BUPIVACAINE HYDROCHLORIDE 2.5 MG/ML
INJECTION, SOLUTION EPIDURAL; INFILTRATION; INTRACAUDAL
Status: COMPLETED | OUTPATIENT
Start: 2024-08-28 | End: 2024-08-28

## 2024-08-28 RX ORDER — DOCUSATE SODIUM 100 MG/1
100 CAPSULE, LIQUID FILLED ORAL DAILY PRN
Qty: 30 CAPSULE | Refills: 0 | Status: SHIPPED | OUTPATIENT
Start: 2024-08-28

## 2024-08-28 RX ORDER — LIDOCAINE HYDROCHLORIDE 20 MG/ML
INJECTION, SOLUTION INTRAVENOUS PRN
Status: DISCONTINUED | OUTPATIENT
Start: 2024-08-28 | End: 2024-08-28 | Stop reason: SDUPTHER

## 2024-08-28 RX ORDER — OXYCODONE HYDROCHLORIDE 5 MG/1
5 TABLET ORAL
Status: COMPLETED | OUTPATIENT
Start: 2024-08-28 | End: 2024-08-28

## 2024-08-28 RX ORDER — OXYCODONE AND ACETAMINOPHEN 5; 325 MG/1; MG/1
1 TABLET ORAL EVERY 6 HOURS PRN
Qty: 28 TABLET | Refills: 0 | Status: SHIPPED | OUTPATIENT
Start: 2024-08-28 | End: 2024-09-04

## 2024-08-28 RX ORDER — SODIUM CHLORIDE 0.9 % (FLUSH) 0.9 %
5-40 SYRINGE (ML) INJECTION PRN
Status: DISCONTINUED | OUTPATIENT
Start: 2024-08-28 | End: 2024-08-28 | Stop reason: HOSPADM

## 2024-08-28 RX ORDER — NALOXONE HYDROCHLORIDE 0.4 MG/ML
INJECTION, SOLUTION INTRAMUSCULAR; INTRAVENOUS; SUBCUTANEOUS PRN
Status: DISCONTINUED | OUTPATIENT
Start: 2024-08-28 | End: 2024-08-28 | Stop reason: HOSPADM

## 2024-08-28 RX ORDER — MIDAZOLAM HYDROCHLORIDE 1 MG/ML
INJECTION INTRAMUSCULAR; INTRAVENOUS
Status: COMPLETED | OUTPATIENT
Start: 2024-08-28 | End: 2024-08-28

## 2024-08-28 RX ORDER — ONDANSETRON 2 MG/ML
INJECTION INTRAMUSCULAR; INTRAVENOUS PRN
Status: DISCONTINUED | OUTPATIENT
Start: 2024-08-28 | End: 2024-08-28 | Stop reason: SDUPTHER

## 2024-08-28 RX ORDER — DROPERIDOL 2.5 MG/ML
0.62 INJECTION, SOLUTION INTRAMUSCULAR; INTRAVENOUS
Status: DISCONTINUED | OUTPATIENT
Start: 2024-08-28 | End: 2024-08-28 | Stop reason: HOSPADM

## 2024-08-28 RX ORDER — ONDANSETRON 2 MG/ML
4 INJECTION INTRAMUSCULAR; INTRAVENOUS
Status: COMPLETED | OUTPATIENT
Start: 2024-08-28 | End: 2024-08-28

## 2024-08-28 RX ORDER — HYDRALAZINE HYDROCHLORIDE 20 MG/ML
10 INJECTION INTRAMUSCULAR; INTRAVENOUS
Status: DISCONTINUED | OUTPATIENT
Start: 2024-08-28 | End: 2024-08-28 | Stop reason: HOSPADM

## 2024-08-28 RX ORDER — KETAMINE HCL 50MG/ML(1)
SYRINGE (ML) INTRAVENOUS PRN
Status: DISCONTINUED | OUTPATIENT
Start: 2024-08-28 | End: 2024-08-28 | Stop reason: SDUPTHER

## 2024-08-28 RX ORDER — KETOROLAC TROMETHAMINE 30 MG/ML
INJECTION, SOLUTION INTRAMUSCULAR; INTRAVENOUS PRN
Status: DISCONTINUED | OUTPATIENT
Start: 2024-08-28 | End: 2024-08-28 | Stop reason: SDUPTHER

## 2024-08-28 RX ORDER — ROCURONIUM BROMIDE 10 MG/ML
INJECTION, SOLUTION INTRAVENOUS PRN
Status: DISCONTINUED | OUTPATIENT
Start: 2024-08-28 | End: 2024-08-28 | Stop reason: SDUPTHER

## 2024-08-28 RX ORDER — PROPOFOL 10 MG/ML
INJECTION, EMULSION INTRAVENOUS PRN
Status: DISCONTINUED | OUTPATIENT
Start: 2024-08-28 | End: 2024-08-28 | Stop reason: SDUPTHER

## 2024-08-28 RX ORDER — CELECOXIB 200 MG/1
200 CAPSULE ORAL 2 TIMES DAILY
Qty: 60 CAPSULE | Refills: 1 | Status: SHIPPED | OUTPATIENT
Start: 2024-08-28

## 2024-08-28 RX ORDER — HYDRALAZINE HYDROCHLORIDE 20 MG/ML
INJECTION INTRAMUSCULAR; INTRAVENOUS PRN
Status: DISCONTINUED | OUTPATIENT
Start: 2024-08-28 | End: 2024-08-28 | Stop reason: SDUPTHER

## 2024-08-28 RX ORDER — LABETALOL HYDROCHLORIDE 5 MG/ML
10 INJECTION, SOLUTION INTRAVENOUS
Status: DISCONTINUED | OUTPATIENT
Start: 2024-08-28 | End: 2024-08-28 | Stop reason: HOSPADM

## 2024-08-28 RX ADMIN — PROPOFOL 140 MG: 10 INJECTION, EMULSION INTRAVENOUS at 07:34

## 2024-08-28 RX ADMIN — FENTANYL CITRATE 25 MCG: 50 INJECTION, SOLUTION INTRAMUSCULAR; INTRAVENOUS at 07:38

## 2024-08-28 RX ADMIN — FENTANYL CITRATE 50 MCG: 50 INJECTION, SOLUTION INTRAMUSCULAR; INTRAVENOUS at 09:28

## 2024-08-28 RX ADMIN — DEXAMETHASONE SODIUM PHOSPHATE 4 MG: 4 INJECTION, SOLUTION INTRA-ARTICULAR; INTRALESIONAL; INTRAMUSCULAR; INTRAVENOUS; SOFT TISSUE at 07:40

## 2024-08-28 RX ADMIN — Medication 25 MG: at 07:34

## 2024-08-28 RX ADMIN — OXYCODONE HYDROCHLORIDE 5 MG: 5 TABLET ORAL at 10:44

## 2024-08-28 RX ADMIN — SODIUM CHLORIDE: 9 INJECTION, SOLUTION INTRAVENOUS at 08:17

## 2024-08-28 RX ADMIN — PROPOFOL 60 MG: 10 INJECTION, EMULSION INTRAVENOUS at 08:09

## 2024-08-28 RX ADMIN — MIDAZOLAM HYDROCHLORIDE 2 MG: 1 INJECTION INTRAMUSCULAR; INTRAVENOUS at 07:20

## 2024-08-28 RX ADMIN — CEFAZOLIN 2000 MG: 2 INJECTION, POWDER, FOR SOLUTION INTRAMUSCULAR; INTRAVENOUS at 07:47

## 2024-08-28 RX ADMIN — SODIUM CHLORIDE: 9 INJECTION, SOLUTION INTRAVENOUS at 06:52

## 2024-08-28 RX ADMIN — HYDRALAZINE HYDROCHLORIDE 5 MG: 20 INJECTION INTRAMUSCULAR; INTRAVENOUS at 08:13

## 2024-08-28 RX ADMIN — ROCURONIUM BROMIDE 50 MG: 10 INJECTION, SOLUTION INTRAVENOUS at 07:36

## 2024-08-28 RX ADMIN — FENTANYL CITRATE 50 MCG: 50 INJECTION, SOLUTION INTRAMUSCULAR; INTRAVENOUS at 08:07

## 2024-08-28 RX ADMIN — ONDANSETRON 4 MG: 2 INJECTION INTRAMUSCULAR; INTRAVENOUS at 08:38

## 2024-08-28 RX ADMIN — KETOROLAC TROMETHAMINE 15 MG: 30 INJECTION, SOLUTION INTRAMUSCULAR; INTRAVENOUS at 08:36

## 2024-08-28 RX ADMIN — FENTANYL CITRATE 50 MCG: 50 INJECTION, SOLUTION INTRAMUSCULAR; INTRAVENOUS at 09:16

## 2024-08-28 RX ADMIN — ONDANSETRON 4 MG: 2 INJECTION INTRAMUSCULAR; INTRAVENOUS at 09:16

## 2024-08-28 RX ADMIN — BUPIVACAINE HYDROCHLORIDE 10 ML: 2.5 INJECTION, SOLUTION EPIDURAL; INFILTRATION; INTRACAUDAL at 07:24

## 2024-08-28 RX ADMIN — LIDOCAINE HYDROCHLORIDE 20 MG: 20 INJECTION, SOLUTION INTRAVENOUS at 07:34

## 2024-08-28 RX ADMIN — FENTANYL CITRATE 25 MCG: 50 INJECTION, SOLUTION INTRAMUSCULAR; INTRAVENOUS at 08:13

## 2024-08-28 ASSESSMENT — PAIN DESCRIPTION - ONSET
ONSET: ON-GOING

## 2024-08-28 ASSESSMENT — PAIN DESCRIPTION - PAIN TYPE
TYPE: SURGICAL PAIN

## 2024-08-28 ASSESSMENT — PAIN DESCRIPTION - FREQUENCY
FREQUENCY: CONTINUOUS

## 2024-08-28 ASSESSMENT — PAIN SCALES - GENERAL
PAINLEVEL_OUTOF10: 3
PAINLEVEL_OUTOF10: 8
PAINLEVEL_OUTOF10: 0
PAINLEVEL_OUTOF10: 3
PAINLEVEL_OUTOF10: 9
PAINLEVEL_OUTOF10: 4
PAINLEVEL_OUTOF10: 3

## 2024-08-28 ASSESSMENT — PAIN - FUNCTIONAL ASSESSMENT

## 2024-08-28 ASSESSMENT — PAIN DESCRIPTION - DESCRIPTORS
DESCRIPTORS: ACHING

## 2024-08-28 ASSESSMENT — PAIN DESCRIPTION - LOCATION
LOCATION: SHOULDER

## 2024-08-28 ASSESSMENT — PAIN DESCRIPTION - ORIENTATION
ORIENTATION: RIGHT

## 2024-08-28 NOTE — PROGRESS NOTES
0904- pt received from OR. Monitors placed and alarms on. Report received from Philly DOTSON. Pt drowsy but arouses to name being called.  0916- pt medicated for complaints of pain and nausea.  0928- pt medicated for complaints of pain.  0945- pt resting comfortably. Denies any needs currently.  0951- pt turned and repositioned in bed. Linens clean and dry.  1015- pt resting comfortably. States pain is now tolerable and denies any nausea.  1027- pt transported to [facility] by RN and bedside report given to Kristyn QUINN.

## 2024-08-28 NOTE — DISCHARGE INSTRUCTIONS
You may remove the dressings the day after surgery.  You may place dry gauze dressing or Band-Aids over the surgical sites.  Okay to shower and gently clean the area with soap and water.      Use your sling when moving about and sleeping.  You may loosen it it at rest for comfort while sitting.  You may also remove the sling for bathing and gentle movement as pain allows.  Do not elevate the arm over 90 degrees or rotate the arm away from the body.    Remove the sling for brief periods as needed for comfort and to perform gentle range of motion activities.  You may practice using circular motions of the shoulder with the arm hanging by your side.  Practice doing gentle range of motion of the elbow, wrist and hand to prevent stiffness.  Okay to gently assist and elevate the arm parallel to the ground as pain allows.    Call the office if you have any concerns for redness, swelling, drainage, or other medical issues.               [facility]  [phone]    Do not drive, work around machines or use equipment.  Do not drink any alcoholic beverages.  Do not smoke while alone.  Avoid making important decisions.  Plan to spend a quiet, relaxed evening @ home.  Resume normal activities as you begin to feel better.  Eat lightly for your first meal, then gradually increase your diet to what is normal for you.  In case of nausea, avoid food and drink only clear liquids.  Resume food as nausea ceases.  Notify your surgeon if you experience fever, chills, large amount of bleeding, difficulty breathing, persistent nausea and vomiting or any other disturbing problem.  Call for a follow-up appointment with your surgeon.

## 2024-08-28 NOTE — H&P
Chief Complaint   Patient presents with    Follow-up    Shoulder Pain       Right shoulder, MRI FU          History of Present Illness:                             Galina Richey is a 58 y.o. female who returns today for follow-up of her right shoulder.  She recently had her MRI and is here today for a review of the results.  She continues to have ongoing pain and feelings of weakness especially when reaching her arm out away from her body.  Pain travels down the lateral aspect of her shoulder and arm.                The injection provided some temporary pain relief but is subsequently worn off and only lasted a few weeks.  She has continued to have aching soreness along the lateral aspect of her arm and shoulder region which is worse with lifting pushing pulling and repetitive activities at work.  Pain is generally better with rest and avoidance of strenuous activity.  She has occasional clicking sensations but no severe popping or instability.     Patient seen in office today for: right shoulder pain, located in the lateral humerus     Date of last injection: 4/25/2024, effective for about 4 weeks      Patient reports 6/10 pain.  RICE and medication are effective to alleviate pain and reduce swelling.      Pain worsened by: Patient reports painful ROM & weight bearing.                Medical History  Patient's medications, allergies, past medical, surgical, social and family histories were reviewed and updated as appropriate.     Past Medical History        Past Medical History:   Diagnosis Date    Arthritis      Arthritis of knee, left           Past Surgical History         Past Surgical History:   Procedure Laterality Date    JOINT REPLACEMENT         scheduled 10/6/21 for right knee    TOTAL KNEE ARTHROPLASTY Right 10/06/2021     RIGHT KNEE TOTAL ARTHROPLASTY ROBOTIC performed by Singh Brennan MD at [facility] OR    TOTAL KNEE ARTHROPLASTY Left 12/20/2021     LEFT KNEE TOTAL ARTHROPLASTY ROBOTIC performed by Singh  crepitation.  Mild clicking at the shoulder with overhead rotational motion.  Skin is intact.  Sensation is intact throughout the upper extremity.  No instability with passive motion of the shoulder.  2+ radial pulse.  No edema.  No muscle atrophy.  Normal scapular motion.     Skin:     General: Skin is warm and dry.   Neurological:      Mental Status: She is alert and oriented to person, place, and time.   Psychiatric:         Mood and Affect: Mood normal.         Behavior: Behavior normal.               Diagnostic testing:     MRI images of the right shoulder reviewed by myself and discussed with the patient today:  Right shoulder, MRI FU      IMPRESSION:  1. Full-thickness tear of the supraspinatus tendon  2. Moderate to severe degenerative changes of the acromioclavicular joint with inferior impingement of the rotator cuff./Degenerative changes of the labrum with thickening of the inferior glenohumeral ligament.   3. Mild long head of the biceps tendinopathy  4. 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        Assessment and Plan  1.  Right shoulder full-thickness rotator cuff tear     2.  Right shoulder impingement syndrome     3.  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  We discussed the severity of the injury to the rotator cuff.  Given the patient's symptoms and the findings on the MRI I have recommended that we proceed with shoulder arthroscopy for rotator cuff repair and subacromial decompression.     We discussed the risks and benefits of surgery.  We discussed the postoperative course and need for initial immobilization followed by subsequent rehabilitation and physical therapy.     We discussed the potential risks with surgery including possible incomplete repair or rerupture after successful repair.

## 2024-08-28 NOTE — ANESTHESIA PROCEDURE NOTES
Peripheral Block    Patient location during procedure: PACU  Reason for block: post-op pain management and at surgeon's request  Start time: 8/28/2024 7:20 AM  End time: 8/28/2024 7:24 AM  Staffing  Performed: resident/CRNA   Resident/CRNA: Sussy Pino APRN - CRNA  Performed by: Sussy Pino APRN - CRNA  Authorized by: Sussy Pino APRN - CRNA    Preanesthetic Checklist  Completed: patient identified, IV checked, site marked, risks and benefits discussed, surgical/procedural consents, equipment checked, pre-op evaluation, timeout performed, anesthesia consent given, oxygen available, monitors applied/VS acknowledged, fire risk safety assessment completed and verbalized and blood product R/B/A discussed and consented  Peripheral Block   Patient position: supine  Prep: ChloraPrep  Provider prep: mask and sterile gloves  Patient monitoring: cardiac monitor, continuous pulse ox, continuous capnometry, frequent blood pressure checks, IV access, oxygen and responsive to questions  Block type: Brachial plexus  Interscalene  Laterality: right  Injection technique: single-shot  Guidance: ultrasound guided    Needle   Needle type: insulated echogenic nerve stimulator needle   Needle gauge: 22 G  Needle localization: ultrasound guidance and nerve stimulator (0.4 mA)  Needle length: 10 cm  Assessment   Injection assessment: negative aspiration for heme, no paresthesia on injection, local visualized surrounding nerve on ultrasound and no intravascular symptoms  Paresthesia pain: none  Slow fractionated injection: yes  Hemodynamics: stable  Outcomes: uncomplicated and patient tolerated procedure well    Medications Administered  midazolam (VERSED) injection 2 mg/2mL - IntraVENous   2 mg - 8/28/2024 7:20:00 AM  BUPivacaine (MARCAINE) PF injection 0.25% - Perineural   10 mL - 8/28/2024 7:24:00 AM  BUPivacaine liposome (EXPAREL) injection 1.3% - Perineural   10 mL - 8/28/2024 7:24:00 AM

## 2024-08-28 NOTE — PROGRESS NOTES
08/28/24 0612   Encounter Summary   Encounter Overview/Reason Pre-Procedural   Service Provided For Family   [facility]   Support System Spouse;Family members   Last Encounter  08/28/24  (Offered prayer and spiritual support for family in wating room)   Complexity of Encounter Low   Begin Time 0610   End Time  0614   Total Time Calculated 4 min   Crisis   Type Family Care   Assessment/Intervention/Outcome   Assessment Peaceful   Intervention Nurtured Hope;Prayer (assurance of)/[place];Sustaining Presence/Ministry of presence   Outcome Expressed Gratitude   Plan and Referrals   Plan/Referrals No future visits requested

## 2024-08-28 NOTE — OP NOTE
from the anterior leading edge of the acromion.  There was a prominent spur present at the undersurface of the acromion and this was shaved performing an acromioplasty removing the prominent spurring of the undersurface of the acromion.  The shaver was also used to perform debridement and removal of the prominent spur formation at the acromioclavicular joint and distal clavicle as well    Attention was then taken to the rotator cuff tear and a cannula was placed.  The shaver is used to debride the area at the greater tuberosity and along the attachment site of the rotator cuff.  The rotator cuff appeared healthy and there was a prominent tear with only mild retraction at the anterior aspect of the supraspinatus.  A Arthrex swivel lock anchor was loaded with fiber tape and this was impacted into place centrally at the articular margin of the rotator cuff footprint.  Each limb of the fiber tape was passed through the rotator cuff using a scorpion suture passer 1 in anterior aspect and 1 in the posterior aspect of the tear.  The limbs of fiber tape were then passed through a second swivel lock anchor which was advanced in position at the lateral aspect of the rotator cuff footprint.  The limbs of suture were tensioned during advancement of the anchor and the rotator cuff reduced appropriately into position with excellent coverage of the greater tuberosity.  The suture limbs were cut.  The shoulder was taken through full range of motion and there was excellent stability of the repair and excellent coverage of the rotator cuff footprint.    The instruments were removed and the portals were closed with interrupted nylon sutures.  A sterile dressing was applied with Xeroform, 4 x 8's, ABD, and foam tape.  She was placed into a sling and extubated and taken to PACU in stable condition.  All sponge needle counts were correct.    Postoperative plan:  Patient will be nonweightbearing in her sling and discharged home.   Follow-up in the office in 2 weeks for suture removal and we will start with physical therapy    Electronically signed by Singh Brennan MD on 8/28/2024 at 8:54 AM

## 2024-08-28 NOTE — PROGRESS NOTES
1032 - received patient from pacu, report received from Karla QUINN, patient A&O, reports pain 3/10, denies nausea, given water, call ight within reach  1044 - pain med given - see mar  1108 - discharge instructions given to patient and family, understanding voiced without any further questions at this time  1141 - VSS, dressing dry   1155 - iv removed  1157 - patient dressing  1202 - patient left sds via wheelchair accompanied by ingrid

## 2024-09-12 ENCOUNTER — OFFICE VISIT (OUTPATIENT)
Dept: ORTHOPEDIC SURGERY | Age: 59
End: 2024-09-12

## 2024-09-12 VITALS
RESPIRATION RATE: 14 BRPM | BODY MASS INDEX: 30.21 KG/M2 | WEIGHT: 160 LBS | HEART RATE: 78 BPM | HEIGHT: 61 IN | OXYGEN SATURATION: 98 %

## 2024-09-12 DIAGNOSIS — M19.011 OSTEOARTHRITIS, LOCALIZED, SHOULDER, RIGHT: ICD-10-CM

## 2024-09-12 DIAGNOSIS — Z98.890 S/P RIGHT ROTATOR CUFF REPAIR: Primary | ICD-10-CM

## 2024-09-12 PROCEDURE — 99024 POSTOP FOLLOW-UP VISIT: CPT

## 2024-09-26 ENCOUNTER — HOSPITAL ENCOUNTER (OUTPATIENT)
Dept: PHYSICAL THERAPY | Age: 59
Setting detail: THERAPIES SERIES
Discharge: HOME | End: 2024-09-26
Payer: COMMERCIAL

## 2024-09-26 PROCEDURE — 97161 PT EVAL LOW COMPLEX 20 MIN: CPT

## 2024-09-26 PROCEDURE — 97110 THERAPEUTIC EXERCISES: CPT

## 2024-09-26 ASSESSMENT — PAIN DESCRIPTION - DESCRIPTORS: DESCRIPTORS: DULL;ACHING;DISCOMFORT

## 2024-09-26 ASSESSMENT — PAIN DESCRIPTION - ORIENTATION: ORIENTATION: RIGHT

## 2024-09-26 ASSESSMENT — PAIN DESCRIPTION - PAIN TYPE: TYPE: SURGICAL PAIN

## 2024-09-26 ASSESSMENT — PAIN DESCRIPTION - LOCATION: LOCATION: SHOULDER

## 2024-09-30 ENCOUNTER — HOSPITAL ENCOUNTER (OUTPATIENT)
Dept: PHYSICAL THERAPY | Age: 59
Setting detail: THERAPIES SERIES
Discharge: HOME | End: 2024-09-30
Payer: COMMERCIAL

## 2024-09-30 PROCEDURE — 97140 MANUAL THERAPY 1/> REGIONS: CPT

## 2024-09-30 PROCEDURE — 97110 THERAPEUTIC EXERCISES: CPT

## 2024-09-30 NOTE — FLOWSHEET NOTE
reps  - Horizontal Shoulder Pendulum with Table Support  - 2 x daily - 7 x weekly - 1 sets - 10 reps  - Flexion-Extension Shoulder Pendulum with Table Support  - 2 x daily - 7 x weekly - 1 sets - 10 reps  - Circular Shoulder Pendulum with Table Support  - 2 x daily - 7 x weekly - 1 sets - 10 reps    Manual Treatments:  PROM , STM and manual      Modalities:        Communication with other providers:  cert sent to Mack Bean PA-C 9/26/24      Assessment: Pt tolerates tx session well without adverse reactions or complications. Responds well to manual with decreased tightness and increased PROM/AAROM.  Pt would benefit from skilled therapy interventions as needed to address listed impairments, progress toward goal completion and improve ADL/IADL status.  End pain: 0/10 \"looser after massage\"     Patient agrees with established plan of care and assisted in the development of their short term and long term goals. Patient had no adverse reaction with initial treatment and there are no barriers to learning. Demonstrates no mental or cognitive disorder.     Plan for Next Session:        Time In / Time Out:     0900 / 0928      Timed Code/Total Treatment Minutes:   28'/28'    1 TE   1 Man      Next Progress Note due:  10/26/24      Plan of Care Interventions:  [x] Therapeutic Exercise  [x] Modalities: prn  [x] Therapeutic Activity     [] Ultrasound  [] Estim  [] Gait Training      [] Cervical Traction [] Lumbar Traction  [x] Neuromuscular Re-education    [] Cold/hotpack [] Iontophoresis   [x] Instruction in HEP      [x] Vasopneumatic   [] Dry Needling    [x] Manual Therapy               [] Aquatic Therapy              Electronically signed by:  Natahlia Monterroso PTA, BSPTA  9/30/2024, 9:00 AM

## 2024-10-03 ENCOUNTER — HOSPITAL ENCOUNTER (OUTPATIENT)
Dept: PHYSICAL THERAPY | Age: 59
Setting detail: THERAPIES SERIES
Discharge: HOME | End: 2024-10-03
Payer: COMMERCIAL

## 2024-10-03 PROCEDURE — 97140 MANUAL THERAPY 1/> REGIONS: CPT

## 2024-10-03 PROCEDURE — 97110 THERAPEUTIC EXERCISES: CPT

## 2024-10-03 NOTE — FLOWSHEET NOTE
Table Support  - 2 x daily - 7 x weekly - 1 sets - 10 reps  - Flexion-Extension Shoulder Pendulum with Table Support  - 2 x daily - 7 x weekly - 1 sets - 10 reps  - Circular Shoulder Pendulum with Table Support  - 2 x daily - 7 x weekly - 1 sets - 10 reps    Manual Treatments:  PROM in all planes. Grade II-III inferior and posterior GH joint mobilizations. Distraction and oscillations.      Modalities:  none      Communication with other providers:  cert sent to Mack Bean PA-C 9/26/24      Assessment: Galina tolerated today's session well. She is weak in the shoulder musculature and fatigued easily with AAROM exercises. She is guarded and tight with PROM and has increased pain at end ranges. Patient will continue to benefit from skilled PT services to address remaining deficits so they may return to their PLOF without pain or limitation. End session pain: same         Plan for Next Session:       Time In / Time Out:  1430/1500       Timed Code/Total Treatment Minutes:   30' 1 man 1 TE       Next Progress Note due:  10/26/24      Plan of Care Interventions:  [x] Therapeutic Exercise  [x] Modalities: prn  [x] Therapeutic Activity     [] Ultrasound  [] Estim  [] Gait Training      [] Cervical Traction [] Lumbar Traction  [x] Neuromuscular Re-education    [] Cold/hotpack [] Iontophoresis   [x] Instruction in HEP      [x] Vasopneumatic   [] Dry Needling    [x] Manual Therapy               [] Aquatic Therapy              Electronically signed by:  Madonna Arciniega PTA      10/3/2024, 11:24 AM

## 2024-10-07 ENCOUNTER — HOSPITAL ENCOUNTER (OUTPATIENT)
Dept: PHYSICAL THERAPY | Age: 59
Setting detail: THERAPIES SERIES
Discharge: HOME | End: 2024-10-07
Payer: COMMERCIAL

## 2024-10-07 PROCEDURE — 97110 THERAPEUTIC EXERCISES: CPT

## 2024-10-07 PROCEDURE — 97140 MANUAL THERAPY 1/> REGIONS: CPT

## 2024-10-07 NOTE — FLOWSHEET NOTE
7 x weekly - 1 sets - 10 reps  - Seated Scapular Retraction  - 2 x daily - 7 x weekly - 1 sets - 10 reps  - Seated Shoulder Flexion Towel Slide at Table Top  - 2 x daily - 7 x weekly - 1 sets - 10 reps  - Horizontal Shoulder Pendulum with Table Support  - 2 x daily - 7 x weekly - 1 sets - 10 reps  - Flexion-Extension Shoulder Pendulum with Table Support  - 2 x daily - 7 x weekly - 1 sets - 10 reps  - Circular Shoulder Pendulum with Table Support  - 2 x daily - 7 x weekly - 1 sets - 10 reps    Manual Treatments:  PROM in all planes. Grade II-III inferior and posterior GH joint mobilizations. Distraction and oscillations.      Modalities:  none      Communication with other providers:  cert sent to Mack Bean PA-C 9/26/24      Assessment:  Galina tolerates exercises very well without any reports of increased pain. She does require occasional cueing to reduce UT compensation during scapular exercises. Patient will continue to benefit from skilled PT services to address remaining deficits so they may return to their PLOF without pain or limitation. End session pain: 0/10            Plan for Next Session:       Time In / Time Out:   1500/1530      Timed Code/Total Treatment Minutes:  30' 1 man 1 TE       Next Progress Note due:  10/26/24      Plan of Care Interventions:  [x] Therapeutic Exercise  [x] Modalities: prn  [x] Therapeutic Activity     [] Ultrasound  [] Estim  [] Gait Training      [] Cervical Traction [] Lumbar Traction  [x] Neuromuscular Re-education    [] Cold/hotpack [] Iontophoresis   [x] Instruction in HEP      [x] Vasopneumatic   [] Dry Needling    [x] Manual Therapy               [] Aquatic Therapy              Electronically signed by:  Madonna Arciniega PTA      10/7/2024, 9:57 AM

## 2024-10-10 ENCOUNTER — HOSPITAL ENCOUNTER (OUTPATIENT)
Dept: PHYSICAL THERAPY | Age: 59
Setting detail: THERAPIES SERIES
Discharge: HOME | End: 2024-10-10
Payer: COMMERCIAL

## 2024-10-10 ENCOUNTER — OFFICE VISIT (OUTPATIENT)
Dept: ORTHOPEDIC SURGERY | Age: 59
End: 2024-10-10

## 2024-10-10 VITALS — OXYGEN SATURATION: 99 % | WEIGHT: 165 LBS | HEART RATE: 63 BPM | HEIGHT: 61 IN | BODY MASS INDEX: 31.15 KG/M2

## 2024-10-10 DIAGNOSIS — Z98.890 S/P RIGHT ROTATOR CUFF REPAIR: Primary | ICD-10-CM

## 2024-10-10 PROCEDURE — 97140 MANUAL THERAPY 1/> REGIONS: CPT

## 2024-10-10 PROCEDURE — 99024 POSTOP FOLLOW-UP VISIT: CPT | Performed by: ORTHOPAEDIC SURGERY

## 2024-10-10 PROCEDURE — 97110 THERAPEUTIC EXERCISES: CPT

## 2024-10-10 NOTE — PROGRESS NOTES
10/10/2024   Chief Complaint   Patient presents with    Shoulder Pain    Injury     right        History of Present Illness:                             Galina Richey is a 58 y.o. female who returns today for follow-up of her right shoulder.  She has been doing well advancing her activities with therapy.  No concerns problems or setbacks today.  She has good range of motion and has been able to use her hand for light activities.    Pt returns to the office for 6 week post op RT- RTCR/SAD. DOS: 8/28/24.  Pt states she has been in physical therapy twice a week and it has helped with her ROM. Pt states her ROM has gotten better. Pt states she has no pain. Pt states she is happy with her progress and has no concerns at this time.        Medical History  Patient's medications, allergies, past medical, surgical, social and family histories were reviewed and updated as appropriate.      Review of Systems                                            Examination:  General Exam:  Vitals: Pulse 63   Ht 1.549 m (5' 1\")   Wt 74.8 kg (165 lb)   SpO2 99%   BMI 31.18 kg/m²    Physical Exam         Right upper extremity:  Well-healed surgical scars at the right shoulder.  Painless active range of motion with persistent mild stiffness and discomfort at the extremes of motion.  Rotator cuff is functioning well against gravity and light resistance today.  Sensation motor functions intact throughout      Office Procedures:  No orders of the defined types were placed in this encounter.      Assessment and Plan  1.  Status post right rotator cuff repair    She has made good progress with her rehabilitation following arthroscopic right rotator cuff repair.    I encouraged her to continue at the guidance of physical therapy and she can advance strengthening and lifting and weightbearing as tolerated.    Follow-up in 6 weeks for check of her progress.        Electronically signed by Singh Brennan MD on 10/10/2024 at 5:11 PM

## 2024-10-10 NOTE — ANESTHESIA POSTPROCEDURE EVALUATION
Department of Anesthesiology  Postprocedure Note    Patient: Galina Richey  MRN: [id number]  YOB: 1965  Date of evaluation: 10/10/2024    Procedure Summary       Date: 08/28/24 Room / Location: [address]    Anesthesia Start: 0729 Anesthesia Stop: 0908    Procedures:       RIGHT SHOULDER ARTHROSCOPY ROTATOR CUFF REPAIR (Right: Shoulder)      RIGHT SHOULDER ARTHROSCOPY ACROMIOPLASTY (Right: Shoulder) Diagnosis:       Primary osteoarthritis of right shoulder      Sprain of right rotator cuff capsule, initial encounter      Impingement syndrome of right shoulder      (Primary osteoarthritis of right shoulder [M19.011])      (Sprain of right rotator cuff capsule, initial encounter [S43.421A])      (Impingement syndrome of right shoulder [M75.41])    Surgeons: Singh Brennan MD Responsible Provider: Kaden Dsouza MD    Anesthesia Type: general, regional ASA Status: 2            Anesthesia Type: No value filed.    Isabel Phase I: Isabel Score: 10    Isabel Phase II: Isabel Score: 10    Anesthesia Post Evaluation    Patient location during evaluation: PACU  Patient participation: complete - patient participated  Level of consciousness: awake and alert  Pain score: 1  Airway patency: patent  Nausea & Vomiting: no nausea and no vomiting  Cardiovascular status: blood pressure returned to baseline and hemodynamically stable  Respiratory status: acceptable, room air and spontaneous ventilation  Hydration status: stable  Pain management: adequate and satisfactory to patient    No notable events documented.

## 2024-10-10 NOTE — PATIENT INSTRUCTIONS
Continue weight-bearing as tolerated.  Continue range of motion exercises as instructed.  Ice and elevate as needed.  Tylenol or Motrin for pain.  Follow up in 6 weeks

## 2024-10-14 ENCOUNTER — HOSPITAL ENCOUNTER (OUTPATIENT)
Dept: PHYSICAL THERAPY | Age: 59
Setting detail: THERAPIES SERIES
Discharge: HOME | End: 2024-10-14
Payer: COMMERCIAL

## 2024-10-14 PROCEDURE — 97110 THERAPEUTIC EXERCISES: CPT

## 2024-10-14 PROCEDURE — 97140 MANUAL THERAPY 1/> REGIONS: CPT

## 2024-10-14 NOTE — FLOWSHEET NOTE
functional limitations. She is  of a [employer] and does all her own housework and cooking.  Pt would benefit from skilled therapy interventions as needed to address listed impairments, progress toward goal completion and improve ADL/IADL status.  PT also warranted to reduce risk for further injury or decline.      Subjective:   Pt arrives to tx session reporting 0/10 pain.      Any changes in Ambulatory Summary Sheet?  None      Objective:     154° supine PROM    Exercises: (No more than 4 columns)  R RC repair 8/28/24  Exercise/Equipment #3 10/3/2024 #4 10/7/2024 10-10-24 10/14/24 #6            WARM UP                        TABLE       Table slide Flex/CW/CCW 20* ea Flex/CW/CCW 20* ea Flex/CW/CCW  with ball, 20* ea Flex/CW/CCW  with ball, 20* ea   Cane ER 20* 20* Supine 2 x 10 with towel roll under arm Supine 2 x 10 with towel roll under arm   Cane flex 20* 20* Seated x 2x10+ supine 2 x 10  Supine 2 x 10    Supine punchup 20* 20* Supine x 20 Supine x 20   Supine circles   Next  X 10 CW/CCW ea , and alphabet  X10 CW/CCW ea, and ABC                 STANDING       codman's 10* ea 10* ea Verbal rev    Scap pinch 2*10 2*10 3\" Verbal rev    Sub max iso All directions 10*5\" ea  All directions 10*5\" X10 all directions X10 all directions   Lawnmower pull 2*10 2*10 X20 good form x20                             PROPRIOCEPTION                                          MODALITIES                         Other Therapeutic Activities/Education:  review importance of posture to help shoulder alignment with exs      Home Exercise Program:  Pt completed 1 set of HEP in clinic with instruction per HEP sheet dated today. Appeared to understand program. Any questions clarified.      Access Code: [email or web address]  URL: [email or web address]  Date: 09/26/2024  Prepared by: Mack Jordan    Exercises  - Supine Shoulder External Rotation in 45 Degrees Abduction AAROM with [place]  - 2 x daily - 7 x weekly - 1 sets - 10 reps  -

## 2024-10-17 ENCOUNTER — HOSPITAL ENCOUNTER (OUTPATIENT)
Dept: PHYSICAL THERAPY | Age: 59
Setting detail: THERAPIES SERIES
Discharge: HOME | End: 2024-10-17
Payer: COMMERCIAL

## 2024-10-17 PROCEDURE — 97140 MANUAL THERAPY 1/> REGIONS: CPT

## 2024-10-17 PROCEDURE — 97110 THERAPEUTIC EXERCISES: CPT

## 2024-10-17 NOTE — FLOWSHEET NOTE
Traction  [x] Neuromuscular Re-education    [] Cold/hotpack [] Iontophoresis   [x] Instruction in HEP      [x] Vasopneumatic   [] Dry Needling    [x] Manual Therapy               [] Aquatic Therapy              Electronically signed by:  Mack Jordan PT,   10/17/2024, 7:46 AM

## 2024-10-21 ENCOUNTER — HOSPITAL ENCOUNTER (OUTPATIENT)
Dept: PHYSICAL THERAPY | Age: 59
Setting detail: THERAPIES SERIES
Discharge: HOME | End: 2024-10-21
Payer: COMMERCIAL

## 2024-10-21 PROCEDURE — 97140 MANUAL THERAPY 1/> REGIONS: CPT

## 2024-10-21 PROCEDURE — 97110 THERAPEUTIC EXERCISES: CPT

## 2024-10-21 NOTE — FLOWSHEET NOTE
strength. This causes her many functional limitations. She is  of a [employer] and does all her own housework and cooking.  Pt would benefit from skilled therapy interventions as needed to address listed impairments, progress toward goal completion and improve ADL/IADL status.  PT also warranted to reduce risk for further injury or decline.      Subjective:   Galina arrives to therapy stating that she was a little sore after last session with the new exercises but feels okay today. She is sleeping in bed now but has a pillow to keep her from rolling onto her R side, does not wake due to her shoulder.      Any changes in Ambulatory Summary Sheet?  None      Objective:  (10/21/2024 Shoulder hike with scaption wall slide. Cues for proper performance of isometrics.)    (10/17/24  R  shoulder PROM  flex 140 scap 156  IR 64 ER  62 )    Exercises: (No more than 4 columns)  R RC repair 8/28/24  Exercise/Equipment 10/14/24 #6 7# 10/17/24 #8 10/1/2024           WARM UP         UBE  4 min 2'/2' @ 120         TABLE      Table slide Flex/CW/CCW  with ball, 20* ea -- --   Cane ER Supine 2 x 10 with towel roll under arm Supine 2*10 --   Cane flex Supine 2 x 10  Supine 2*10 Supine 2*10   Supine punchup Supine x 20 *20 20*   Supine circles  X10 CW/CCW ea, and ABC Abc 1#  *1 1# 1* ABC   SL punch  *20  2*10   SL ER  *20 2*10                     STANDING      Sub max iso X10 all directions *10 each 10*3\" ea dir    Lawnmower pull x20 *20 20*   rows  Red 2*10 RTB 2*10   ext  Red 2*10 RTB 2*10   ER with band  Red 2*10 RTB 2*10   IR with band  Red 2*10 RTB 2*10   Wall slide  Flex,scap 10* ea Flex/Scap 10* ea         PROPRIOCEPTION                                    MODALITIES                      Other Therapeutic Activities/Education:  review importance of posture to help shoulder alignment with exs      Home Exercise Program:     Access Code: [email or web address]  URL: [email or web address]  Date: 09/26/2024  Prepared by: Mack

## 2024-10-24 ENCOUNTER — HOSPITAL ENCOUNTER (OUTPATIENT)
Dept: PHYSICAL THERAPY | Age: 59
Setting detail: THERAPIES SERIES
Discharge: HOME | End: 2024-10-24
Payer: COMMERCIAL

## 2024-10-24 PROCEDURE — 97110 THERAPEUTIC EXERCISES: CPT

## 2024-10-24 PROCEDURE — 97140 MANUAL THERAPY 1/> REGIONS: CPT

## 2024-10-24 NOTE — FLOWSHEET NOTE
Outpatient Physical Therapy  [place]           [x] Phone: [phone]   Fax: [phone]  [place]           [] Phone: [phone]   Fax: [phone]        Physical Therapy Daily Treatment Note  Date:  10/24/2024    Patient Name:  Galina Richey                         :  1965                   MRN: [id number]  Restrictions/Precautions: No data recorded   Diagnosis:   Osteoarthritis, localized, shoulder, right [M19.011]  S/P right rotator cuff repair [Z98.890]    Date of Injury/Surgery:   Treatment Diagnosis:  R shoulder RC repair, 29, limited ROM , strength, elevated pain  Insurance/Certification information: med mutual 20PCY  Referring Physician:  Mack Bean PA-C     PCP: Jhonatan Dick MD  Next Doctor Visit:  24  Plan of care signed (Y/N):  Y  Outcome Measure:  Q Dash: 40 pts    Visit# / total visits:  -    Pain level:        0/10  @ rest, up to 1/10 with certain movement        Goals:     Patient goals: get back normal use of her R arm and typical activity at home and in [employer] working  Short term goals  Time Frame for Short term goals: 6 weeks  1. ind with HEP for ROM and shoulder strength Reports compliance (10/3)  2. R shoulder PROM scap  130 ER  70 Scap 156°, ER 62° (10/21)  3.  reports sleeping comfortably in bed   4.  ind with dressing and doing her hair  Long Term Goals  Time Frame for Long Term Goals: 12 weeks  1. AROM flex and scap  160  behind head: T3 , behind back : L1  2. MMT R shoulder  4/5 flex , scap ER and IR  3. 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 reports 74% better overall in the R shoulder with all typical activity includiing housework  5. Q Dsh improved to 15 pts or less for R shoulder        Summary of Evaluation:  Assessment: Pt appears today post R RC repair 24, she is no longer required to wear the sling and is not having high pain levels. She is limited as expected in her ROM

## 2024-10-28 ENCOUNTER — HOSPITAL ENCOUNTER (OUTPATIENT)
Dept: PHYSICAL THERAPY | Age: 59
Setting detail: THERAPIES SERIES
Discharge: HOME | End: 2024-10-28
Payer: COMMERCIAL

## 2024-10-28 PROCEDURE — 97140 MANUAL THERAPY 1/> REGIONS: CPT

## 2024-10-28 PROCEDURE — 97110 THERAPEUTIC EXERCISES: CPT

## 2024-10-28 NOTE — PROGRESS NOTES
Outpatient Physical Therapy           [place]           [x] Phone: [phone]   Fax: [phone]  [place]           [] Phone: [phone]   Fax: [phone]      To: Mack Bean PA-C     From: Mack Jordan, PT, DPT     Patient: Galina Richey                    : 1965  Diagnosis:  Osteoarthritis, localized, shoulder, right [M19.011]  S/P right rotator cuff repair [Z98.890]        Treatment Diagnosis:     R shoulder RC repair, 29, limited ROM , strength, elevated pain   Date: 10/28/2024  [x]  Progress Note                []  Discharge Note    Evaluation Date:   24  Total Visits to date:   10 Cancels/No-shows to date:  0    Subjective:   Pt  states her shoulder is feeling good and notes she does feel she is reaching better and getting stronger. She feels she is making good progress and starting to use her arm more and more. She does still limited her lifting of any weighted object over head but is doing more with her arm below shoulder level. She states she feels 80% better overall.       Plan of Care/Treatment to date:  [x] Therapeutic Exercise    [x] Modalities: prn  [x] Therapeutic Activity     [] Ultrasound  [] Electrical Stimulation  [] Gait Training      [] Cervical Traction   [] Lumbar Traction  [x] Neuromuscular Re-education  [] Cold/hotpack [] Iontophoresis  [x] Instruction in HEP      Other:  [x] Manual Therapy       [x]  Vasopneumatic  [] Aquatic Therapy       []   Dry Needle Therapy                      Objective/Significant Findings At Last Visit/Comments:   ind with HEP,  R shoulder PROM  flex 159 scap 158 ER  65  IR 66,   AROM flex 156 scap 154 behind head : T2 behind back : L4 ,  ind with dressing and doing her hair, MMT not tested, has not returned to work,  80% better  with all typical activity using her R shoulder, Q Dash: 17 Pts       Assessment:    Pt appears to be making good progress toward all ROM and functional goals in general. She dos remains with some

## 2024-10-28 NOTE — FLOWSHEET NOTE
Outpatient Physical Therapy  [place]           [x] Phone: [phone]   Fax: [phone]  [place]           [] Phone: [phone]   Fax: [phone]        Physical Therapy Daily Treatment Note  Date:  10/28/2024    Patient Name:  Galina Richey                         :  1965                   MRN: [id number]  Restrictions/Precautions: No data recorded   Diagnosis:   Osteoarthritis, localized, shoulder, right [M19.011]  S/P right rotator cuff repair [Z98.890]    Date of Injury/Surgery:   Treatment Diagnosis:  R shoulder RC repair, 29, limited ROM , strength, elevated pain  Insurance/Certification information: med [place] 20PCY  Referring Physician:  Mack Bean PA-C     PCP: Jhonatan Dick MD  Next Doctor Visit:  24  Plan of care signed (Y/N):  Y  Outcome Measure:  Q Dash:17 pts    Visit# / total visits:  10/  20-    Pain level:        0/10  @ rest, up to 1/10 with certain movement        Goals:     Patient goals: get back normal use of her R arm and typical activity at home and in [employer] working  Short term goals  Time Frame for Short term goals: 6 weeks  1. ind with HEP for ROM and shoulder strength met  2. R shoulder PROM scap  130 ER  70 not met  3.  reports sleeping comfortably in bed   met  4.  ind with dressing and doing her hair  met  Long Term Goals  Time Frame for Long Term Goals: 12 weeks  1. AROM flex and scap  160  behind head: T3 , behind back : L1  not met  2. MMT R shoulder  4/5 flex , scap ER and IR  not met  3. 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 reports 75% better overall in the R shoulder with all typical activity includiing housework not met  5. Q Dash improved to 15 pts or less for R shoulder  not met        Summary of Evaluation:  Assessment: Pt appears today post R RC repair 24, she is no longer required to wear the sling and is not having high pain levels. She is limited as

## 2024-10-31 ENCOUNTER — HOSPITAL ENCOUNTER (OUTPATIENT)
Dept: PHYSICAL THERAPY | Age: 59
Setting detail: THERAPIES SERIES
Discharge: HOME | End: 2024-10-31
Payer: COMMERCIAL

## 2024-10-31 PROCEDURE — 97110 THERAPEUTIC EXERCISES: CPT

## 2024-10-31 PROCEDURE — 97140 MANUAL THERAPY 1/> REGIONS: CPT

## 2024-10-31 NOTE — FLOWSHEET NOTE
Outpatient Physical Therapy  [place]           [x] Phone: [phone]   Fax: [phone]  [place]           [] Phone: [phone]   Fax: [phone]        Physical Therapy Daily Treatment Note  Date:  10/31/2024    Patient Name:  Galina Richey                         :  1965                   MRN: [id number]  Restrictions/Precautions: No data recorded   Diagnosis:   Osteoarthritis, localized, shoulder, right [M19.011]  S/P right rotator cuff repair [Z98.890]    Date of Injury/Surgery:   Treatment Diagnosis:  R shoulder RC repair, 29, limited ROM , strength, elevated pain  Insurance/Certification information: med [place] 20PCY  Referring Physician:  Mack Bean PA-C     PCP: Jhonatan Dick MD  Next Doctor Visit:  24  Plan of care signed (Y/N):  Y  Outcome Measure:  Q Dash:17 pts    Visit# / total visits:  -    Pain level:        0/10  @ rest, up to 1/10 with certain movement        Goals:     Patient goals: get back normal use of her R arm and typical activity at home and in [employer] working  Short term goals  Time Frame for Short term goals: 6 weeks  1. ind with HEP for ROM and shoulder strength met  2. R shoulder PROM scap  130 ER  70 not met  3.  reports sleeping comfortably in bed   met  4.  ind with dressing and doing her hair  met  Long Term Goals  Time Frame for Long Term Goals: 12 weeks  1. AROM flex and scap  160  behind head: T3 , behind back : L1  not met  2. MMT R shoulder  4/5 flex , scap ER and IR  not met  3. 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 reports 75% better overall in the R shoulder with all typical activity includiing housework not met  5. Q Dash improved to 15 pts or less for R shoulder  not met        Summary of Evaluation:  Assessment: Pt appears today post R RC repair 24, she is no longer required to wear the sling and is not having high pain levels. She is limited as

## 2024-11-04 ENCOUNTER — HOSPITAL ENCOUNTER (OUTPATIENT)
Dept: PHYSICAL THERAPY | Age: 59
Setting detail: THERAPIES SERIES
Discharge: HOME | End: 2024-11-04
Payer: COMMERCIAL

## 2024-11-04 PROCEDURE — 97110 THERAPEUTIC EXERCISES: CPT

## 2024-11-04 PROCEDURE — 97140 MANUAL THERAPY 1/> REGIONS: CPT

## 2024-11-04 NOTE — FLOWSHEET NOTE
expected in her ROM and strength. This causes her many functional limitations. She is  of a [employer] and does all her own housework and cooking.  Pt would benefit from skilled therapy interventions as needed to address listed impairments, progress toward goal completion and improve ADL/IADL status.  PT also warranted to reduce risk for further injury or decline.      Subjective:   Galina arrives to therapy stating that she has no pain and she has not had any since her surgery.       Any changes in Ambulatory Summary Sheet?  None      Objective:      ( 11/4/24: able to increase weights without issue, flexion 156° AAROM )  (10/31/2024 mild shoulder hike with flexion/scaption > 90°. Fatigue with some exercises.)  (10/28/24 ind with HEP,  R shoulder PROM  flex 159 scap 158 ER  65  IR 66,   AROM flex 156 scap 154 behind head : T2 behind back : L4 ,  ind with dressing and doing her hair, MMT not tested, has not returned to work,  80% better  with all typical activity using her R shoulder, Q Dash: 17 Pts)  ( 10/24/24 R shoulder  PROM flex 147, AROM flex  137, behind head:T2 )   (10/21/2024 Shoulder hike with scaption wall slide. Cues for proper performance of isometrics.)  (10/17/24  R  shoulder PROM  flex 140 scap 156  IR 64 ER  62 )    Exercises: (No more than 4 columns)  R RC repair 8/28/24  Exercise/Equipment 9# 10/24/24 10# 10/28/24 #11 10/31/2024 #12 11/4/24            WARM UP       UBE 4 min 4 min 2'/2'  4'          TABLE       Supine punchup 2# 2*10 3# 2*10 3# 2*10 3# 2x10    Supine ABC 1# 1*ABC 1# *1  1# 1* 2# x1   SL punch 2*10 1# 2*10 1# 2*10 2# 2x10   SL ER 2#  2*10 2# 2*10 2# 2*10 2# 2x10   Prone flex 2*10 2*10  2*10 0# 2x10   Prone HA 2*10  2*10 2*10 1# 2x10   Prone ext 2*10 2*10 2*10 1# 2x10   SL abd  1# 2*10 1# 2*10 2# 2x10          STANDING       rows Gtb 2*10 Gtb 2*10 GTB 2*10 GTB 2*10   ext Gtb 2*10 Gtb 2*10 GTB 2*10 GTB 2*10   ER with band Gtb 2*10 Gtb 2*10 GTB 2*10 GTB 2*10   IR with band Gtb  None

## 2024-11-07 ENCOUNTER — HOSPITAL ENCOUNTER (OUTPATIENT)
Dept: PHYSICAL THERAPY | Age: 59
Setting detail: THERAPIES SERIES
Discharge: HOME | End: 2024-11-07
Payer: COMMERCIAL

## 2024-11-07 PROCEDURE — 97110 THERAPEUTIC EXERCISES: CPT

## 2024-11-07 PROCEDURE — 97140 MANUAL THERAPY 1/> REGIONS: CPT

## 2024-11-07 NOTE — FLOWSHEET NOTE
Anchored Resistance  - 1 x daily - 7 x weekly - 3 sets - 10 reps    10/31/2024: Gave towel behind the back stretch for IR.    Manual Treatments:  PROM in all planes. Distraction and oscillations. Grade II-III inferior and posterior GH joint mobilizations        Modalities:  none      Communication with other providers:  cert sent to Mack Bean PA-C 9/26/24, PN  10/28/24      Assessment:  Pt continues to progress very well , noting very little paina nd doing well with her activity at home and reaching. She cedillo still have some weakness and fatigues fairly quickly with resistance activity. Patient will continue to benefit from skilled PT services to address remaining deficits so they may return to their PLOF without pain or limitation.   End session pain: 1/10 fatigue      Plan for Next Session: cont poc / protocol      Time In / Time Out:    5575  - 2873      Timed Code/Total Treatment Minutes:    2 TE ( 35 )    1 Man (  10)        Next Progress Note due:  11/21/24      Plan of Care Interventions:  [x] Therapeutic Exercise  [x] Modalities: prn  [x] Therapeutic Activity     [] Ultrasound  [] Estim  [] Gait Training      [] Cervical Traction [] Lumbar Traction  [x] Neuromuscular Re-education    [] Cold/hotpack [] Iontophoresis   [x] Instruction in HEP      [x] Vasopneumatic   [] Dry Needling    [x] Manual Therapy               [] Aquatic Therapy              Electronically signed by:  Mack Jordan, PT    11/7/2024, 8:21 AM

## 2024-11-11 ENCOUNTER — HOSPITAL ENCOUNTER (OUTPATIENT)
Dept: PHYSICAL THERAPY | Age: 59
Setting detail: THERAPIES SERIES
Discharge: HOME | End: 2024-11-11
Payer: COMMERCIAL

## 2024-11-11 PROCEDURE — 97110 THERAPEUTIC EXERCISES: CPT

## 2024-11-11 PROCEDURE — 97140 MANUAL THERAPY 1/> REGIONS: CPT

## 2024-11-11 NOTE — FLOWSHEET NOTE
[place]    Exercises  - Standing Shoulder Row with Anchored Resistance  - 1 x daily - 7 x weekly - 3 sets - 10 reps  - Shoulder extension with resistance - Neutral  - 1 x daily - 7 x weekly - 3 sets - 10 reps  - Shoulder Internal Rotation with Resistance  - 1 x daily - 7 x weekly - 3 sets - 10 reps  - Shoulder External Rotation with Anchored Resistance  - 1 x daily - 7 x weekly - 3 sets - 10 reps    10/31/2024: Gave towel behind the back stretch for IR.    Manual Treatments:  PROM in all planes. Distraction and oscillations. Grade II-III inferior and posterior GH joint mobilizations        Modalities:  none      Communication with other providers:  cert sent to Mack Bean PA-C 9/26/24, PN  10/28/24      Assessment:   Pt continues to progress very well, noting very little pain and doing well with her activity at home and reaching. She does remains with mild tightness nd some discomfort with ER ROM. Patient will continue to benefit from skilled PT services to address remaining deficits so they may return to their PLOF without pain or limitation.   End session pain: 1/10 fatigue      Plan for Next Session: cont poc / protocol      Time In / Time Out:     3696- 7956      Timed Code/Total Treatment Minutes:     2 TE ( 35 )    1 Man (  10)        Next Progress Note due:  11/21/24      Plan of Care Interventions:  [x] Therapeutic Exercise  [x] Modalities: prn  [x] Therapeutic Activity     [] Ultrasound  [] Estim  [] Gait Training      [] Cervical Traction [] Lumbar Traction  [x] Neuromuscular Re-education    [] Cold/hotpack [] Iontophoresis   [x] Instruction in HEP      [x] Vasopneumatic   [] Dry Needling    [x] Manual Therapy               [] Aquatic Therapy              Electronically signed by:  Mack Jordan, PT    11/11/2024, 7:59 AM

## 2024-11-14 ENCOUNTER — HOSPITAL ENCOUNTER (OUTPATIENT)
Dept: PHYSICAL THERAPY | Age: 59
Setting detail: THERAPIES SERIES
Discharge: HOME | End: 2024-11-14
Payer: COMMERCIAL

## 2024-11-14 PROCEDURE — 97110 THERAPEUTIC EXERCISES: CPT

## 2024-11-14 PROCEDURE — 97140 MANUAL THERAPY 1/> REGIONS: CPT

## 2024-11-14 NOTE — FLOWSHEET NOTE
Outpatient Physical Therapy  [place]           [x] Phone: [phone]   Fax: [phone]  [place]           [] Phone: [phone]   Fax: [phone]        Physical Therapy Daily Treatment Note  Date:  2024    Patient Name:  Galina Richey                         :  1965                   MRN: [id number]  Restrictions/Precautions: No data recorded   Diagnosis:   Osteoarthritis, localized, shoulder, right [M19.011]  S/P right rotator cuff repair [Z98.890]    Date of Injury/Surgery:   Treatment Diagnosis:  R shoulder RC repair, 29, limited ROM , strength, elevated pain  Insurance/Certification information: med mutual 20PCY  Referring Physician:  Mack Bean PA-C     PCP: Jhonatan Dick MD  Next Doctor Visit:  24  Plan of care signed (Y/N):  Y  Outcome Measure:  Q Dash:17 pts    Visit# / total visits:  15/  20-    Pain level:          0/10     Goals:     Patient goals: get back normal use of her R arm and typical activity at home and in [employer] working  Short term goals  Time Frame for Short term goals: 6 weeks  1. ind with HEP for ROM and shoulder strength met  2. R shoulder PROM scap  130 ER  70 not met  3.  reports sleeping comfortably in bed   met  4.  ind with dressing and doing her hair  met  Long Term Goals  Time Frame for Long Term Goals: 12 weeks  1. AROM flex and scap  160  behind head: T3 , behind back : L1  not met  2. MMT R shoulder  4/5 flex , scap ER and IR  not met  3. 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 reports 75% better overall in the R shoulder with all typical activity includiing housework not met  5. Q Dash improved to 15 pts or less for R shoulder  not met       Summary of Evaluation:  Assessment: Pt appears today post R RC repair 24, she is no longer required to wear the sling and is not having high pain levels. She is limited as expected in her ROM and strength. This causes her

## 2024-11-18 ENCOUNTER — HOSPITAL ENCOUNTER (OUTPATIENT)
Dept: PHYSICAL THERAPY | Age: 59
Setting detail: THERAPIES SERIES
Discharge: HOME | End: 2024-11-18
Payer: COMMERCIAL

## 2024-11-18 PROCEDURE — 97110 THERAPEUTIC EXERCISES: CPT

## 2024-11-18 PROCEDURE — 97140 MANUAL THERAPY 1/> REGIONS: CPT

## 2024-11-18 NOTE — FLOWSHEET NOTE
towel behind the back stretch for IR.    Manual Treatments:  PROM in all planes. Distraction and oscillations. TTP levator scap. Scapular mobilizations.      Modalities:  none      Communication with other providers:  cert sent to Mack Bean PA-C 9/26/24, PN  10/28/24      Assessment:   Galina had tightness and tenderness in her levator scap with limited mobility of the medial border of the scapula today which improved with manual interventions. She is gaining strength and mobility each session and is doing well overall. She does still demonstrate altered mechanics with elevation due to weakness in the RTC musculature. End session pain: 0/10       Plan for Next Session: cont poc / protocol      Time In / Time Out:     0265/7695      Timed Code/Total Treatment Minutes: 40' 1 man 2 TE         Next Progress Note due:  11/21/24      Plan of Care Interventions:  [x] Therapeutic Exercise  [x] Modalities: prn  [x] Therapeutic Activity     [] Ultrasound  [] Estim  [] Gait Training      [] Cervical Traction [] Lumbar Traction  [x] Neuromuscular Re-education    [] Cold/hotpack [] Iontophoresis   [x] Instruction in HEP      [x] Vasopneumatic   [] Dry Needling    [x] Manual Therapy               [] Aquatic Therapy              Electronically signed by:  Madonna Arciniega,SILVIA        11/18/2024, 1:09 PM

## 2024-11-21 ENCOUNTER — OFFICE VISIT (OUTPATIENT)
Dept: ORTHOPEDIC SURGERY | Age: 59
End: 2024-11-21
Payer: COMMERCIAL

## 2024-11-21 ENCOUNTER — HOSPITAL ENCOUNTER (OUTPATIENT)
Dept: PHYSICAL THERAPY | Age: 59
Setting detail: THERAPIES SERIES
Discharge: HOME | End: 2024-11-21
Payer: COMMERCIAL

## 2024-11-21 VITALS — OXYGEN SATURATION: 99 % | HEART RATE: 87 BPM | WEIGHT: 165 LBS | HEIGHT: 61 IN | BODY MASS INDEX: 31.15 KG/M2

## 2024-11-21 DIAGNOSIS — Z98.890 S/P RIGHT ROTATOR CUFF REPAIR: Primary | ICD-10-CM

## 2024-11-21 PROCEDURE — 97110 THERAPEUTIC EXERCISES: CPT

## 2024-11-21 PROCEDURE — 99212 OFFICE O/P EST SF 10 MIN: CPT | Performed by: ORTHOPAEDIC SURGERY

## 2024-11-21 PROCEDURE — 97140 MANUAL THERAPY 1/> REGIONS: CPT

## 2024-11-21 ASSESSMENT — ENCOUNTER SYMPTOMS
VOMITING: 0
WHEEZING: 0
COLOR CHANGE: 0
CHEST TIGHTNESS: 0
SHORTNESS OF BREATH: 0
EYE PAIN: 0
EYE REDNESS: 0

## 2024-11-21 NOTE — FLOWSHEET NOTE
functional limitations. She is  of a [employer] and does all her own housework and cooking.  Pt would benefit from skilled therapy interventions as needed to address listed impairments, progress toward goal completion and improve ADL/IADL status.  PT also warranted to reduce risk for further injury or decline.      Subjective: pt states she is not having any trouble with most activity . 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 She states her only limitation seem to be reaching behind her back. She notes that is mainly just tight. She reports feeling 90% better overall.       Any changes in Ambulatory Summary Sheet?  None      Objective:  ( 11/21/24 R shoulder  MMT IR 4/5 ER 4-/5, scap 4-/5 flex 4-/5 , AROM flex 160 scap 159 behind head: T2, behnd back : L2 , PROM flex 162 scap 161 ER 71 IR 79 ,  Q Dash: 11 pts,  90% better overall, not returned to working at [employer], no trouble sleeping or doing her hair reported)     (11/18/2024 TTP levator scap near insertion on superior angle of scapula. Fair form with D2. Cues to reduce shoulder hike with flexion/scaption)   (11/11/24 R shoulder AROM  behind back: L1, behind head: T2 )   ( 11/7/24  R shoulder AROM flex 160  scap 158  )  ( 11/4/24: able to increase weights without issue, flexion 156° AAROM )  (10/31/2024 mild shoulder hike with flexion/scaption > 90°. Fatigue with some exercises.)  (10/28/24 ind with HEP,  R shoulder PROM  flex 159 scap 158 ER  65  IR 66,   AROM flex 156 scap 154 behind head : T2 behind back : L4 ,  ind with dressing and doing her hair, MMT not tested, has not returned to work,  80% better  with all typical activity using her R shoulder, Q Dash: 17 Pts)  ( 10/24/24 R shoulder  PROM flex 147, AROM flex  137, behind head:T2 )   (10/21/2024 Shoulder hike with scaption wall slide. Cues for proper performance of isometrics.)  (10/17/24  R  shoulder PROM  flex 140 scap 156  IR 64 ER  62 )    Exercises: (No

## 2024-11-21 NOTE — PROGRESS NOTES
Outpatient Physical Therapy           [place]           [x] Phone: [phone]   Fax: [phone]  [place]           [] Phone: [phone]   Fax: [phone]      To: Mack Bean PA-C     From: Mack Jordan, PT, DPT     Patient: Galina Richey                    : 1965  Diagnosis:  Osteoarthritis, localized, shoulder, right [M19.011]  S/P right rotator cuff repair [Z98.890]        Treatment Diagnosis:     R shoulder RC repair, 29, limited ROM , strength, elevated pain   Date: 2024  [x]  Progress Note                []  Discharge Note    Evaluation Date:   24  Total Visits to date:   17 Cancels/No-shows to date:  0    Subjective:  pt states she is not having any trouble with most activity . 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 She states her only limitation seem to be reaching behind her back. She notes that is mainly just tight. She reports feeling 90% better overall.       Plan of Care/Treatment to date:  [x] Therapeutic Exercise    [] Modalities:  [x] Therapeutic Activity     [] Ultrasound  [] Electrical Stimulation  [x] Gait Training      [] Cervical Traction   [] Lumbar Traction  [x] Neuromuscular Re-education  [] Cold/hotpack [] Iontophoresis  [x] Instruction in HEP      Other:  [x] Manual Therapy       []  Vasopneumatic  [] Aquatic Therapy       []   Dry Needle Therapy                      Objective/Significant Findings At Last Visit/Comments:  R shoulder  MMT IR 4/5 ER 4-/5, scap 4-/5 flex 4-/5 , AROM flex 160 scap 159 behind head: T2, behnd back : L2 , PROM flex 162 scap 161 ER 71 IR 79 ,  Q Dash: 11 pts,  90% better overall, not returned to working at [employer], no trouble sleeping or doing her hair reported       Assessment:   Pt is making steady progress toward all goals, she sis still mildly tight in IR but doing wel with ER and elevations. She appears she is doing well with her HEP and is gaining in strength and ability to

## 2024-11-21 NOTE — PROGRESS NOTES
11/21/2024   Chief Complaint   Patient presents with    Post-Op Check     12 week post op RT RTCR/SAD 8/2/24        History of Present Illness:                             Galina Richey is a 59 y.o. female who returns today for follow-up of her right shoulder rotator cuff repair.  She is doing well through the rehabilitation process and has no setbacks or concerns today.  Her shoulder is getting stronger and has good range of motion and she is very pleased with her recovery    Pt returns to the office for 12 week post op RT RTCD/SAD. DOS: 8/28/24. Pt states she is currently in physical therapy and it is going well. Pt states she feels great and has no issues. Pt states she has no pain. Pt is very pleased with her recovery.     Medical History  Patient's medications, allergies, past medical, surgical, social and family histories were reviewed and updated as appropriate.      Review of Systems   Constitutional:  Negative for chills and fever.   HENT:  Negative for congestion and sneezing.    Eyes:  Negative for pain and redness.   Respiratory:  Negative for chest tightness, shortness of breath and wheezing.    Cardiovascular:  Negative for chest pain and palpitations.   Gastrointestinal:  Negative for vomiting.   Musculoskeletal:  Positive for arthralgias.   Skin:  Negative for color change and rash.   Neurological:  Negative for weakness and numbness.   Psychiatric/Behavioral:  Negative for agitation. The patient is not nervous/anxious.                                                Examination:  General Exam:  Vitals: Pulse 87   Ht 1.549 m (5' 1\")   Wt 74.8 kg (165 lb)   SpO2 99%   BMI 31.18 kg/m²    Physical Exam   Right upper extremity:  Well-healed surgical scars over the shoulder.  Painless active range of motion present throughout the shoulder with near full range of motion only mild restriction in internal rotation.  Rotator cuff is functioning well against gravity and light resistance.  Sensation

## 2024-11-21 NOTE — PROGRESS NOTES
Pt returns to the office for 12 week post op RT RTCD/SAD. DOS: 8/28/24. Pt states she is currently in physical therapy and it is going well. Pt states she feels great and has no issues. Pt states she has no pain. Pt is very pleased with her recovery.

## 2024-12-16 ENCOUNTER — HOSPITAL ENCOUNTER (OUTPATIENT)
Dept: PHYSICAL THERAPY | Age: 59
Setting detail: THERAPIES SERIES
Discharge: HOME | End: 2024-12-16

## 2024-12-16 NOTE — FLOWSHEET NOTE
Physical Therapy  Cancellation/No-show Note  Patient Name:  Galina Richey  :  1965   Date:  2024  Cancelled visits to date: `1  No-shows to date: 0    For today's appointment patient:  [x]  Cancelled  []  Rescheduled appointment  []  No-show     Reason given by patient:  [x]  Patient ill  []  Conflicting appointment  []  No transportation    []  Conflict with work  []  No reason given  []  Other:     Comments:  this was her last scheduled visit and she was doing well, we will hold her chart open for 30 days     Electronically signed by:  Mack Jordan PT,

## 2025-03-25 ENCOUNTER — HOSPITAL ENCOUNTER (OUTPATIENT)
Dept: WOMENS IMAGING | Age: 60
Discharge: HOME | End: 2025-03-25
Payer: COMMERCIAL

## 2025-03-25 VITALS — HEIGHT: 61 IN | BODY MASS INDEX: 30.96 KG/M2 | WEIGHT: 164 LBS

## 2025-03-25 DIAGNOSIS — Z12.31 ENCOUNTER FOR SCREENING MAMMOGRAM FOR BREAST CANCER: ICD-10-CM

## 2025-03-25 PROCEDURE — 77063 BREAST TOMOSYNTHESIS BI: CPT

## 2025-03-27 ENCOUNTER — TRANSCRIBE ORDERS (OUTPATIENT)
Dept: ADMINISTRATIVE | Age: 60
End: 2025-03-27

## 2025-03-27 DIAGNOSIS — M85.89 OSTEOPENIA OF MULTIPLE SITES: Primary | ICD-10-CM

## 2025-04-01 ENCOUNTER — HOSPITAL ENCOUNTER (OUTPATIENT)
Dept: WOMENS IMAGING | Age: 60
Discharge: HOME | End: 2025-04-01
Payer: COMMERCIAL

## 2025-04-01 DIAGNOSIS — M85.89 OSTEOPENIA OF MULTIPLE SITES: ICD-10-CM

## 2025-04-01 PROCEDURE — 77080 DXA BONE DENSITY AXIAL: CPT

## (undated) DEVICE — CORD RETRCT SIL

## (undated) DEVICE — HOOD, PEEL-AWAY: Brand: FLYTE

## (undated) DEVICE — SUTURE STRATAFIX SYMMETRIC SZ 1 L18IN ABSRB VLT CT1 L36CM SXPP1A404

## (undated) DEVICE — STOCKING,ANTI-EMBOLISM,T-L,XL REG,LF: Brand: MEDLINE

## (undated) DEVICE — SUTURE STRATAFIX SPRL SZ 1 L14IN ABSRB VLT L48CM CTX 1/2 SXPD2B405

## (undated) DEVICE — WEREWOLF FLOW 50 COBLATION WAND: Brand: COBLATION

## (undated) DEVICE — SOLUTION IV 100ML 0.9% SOD CHL PLAS CONT USP VIAFLX 1 PER

## (undated) DEVICE — GLOVE ORANGE PI 8   MSG9080

## (undated) DEVICE — IMMOBILIZER SHLDR SWTH UNIV DEV BLK P.A.D. III

## (undated) DEVICE — 4-PORT MANIFOLD: Brand: NEPTUNE 2

## (undated) DEVICE — SYRINGE 10ML HYPO W/O NDL W/ LUER SLP TP

## (undated) DEVICE — SUTURE STARTAFIX 3-0 PS-1 30CM

## (undated) DEVICE — DRESSING,GAUZE,XEROFORM,CURAD,1"X8",ST: Brand: CURAD

## (undated) DEVICE — PIN BNE FIX L110MM DIA32MM

## (undated) DEVICE — 3M™ STERI-DRAPE™ U-DRAPE 1015: Brand: STERI-DRAPE™

## (undated) DEVICE — GLOVE SURG SZ 65 THK91MIL LTX FREE SYN POLYISOPRENE

## (undated) DEVICE — SYSTEM SKIN CLSR 22CM DERMBND PRINEO

## (undated) DEVICE — WEREWOLF FLOW 90 COBLATION WAND: Brand: COBLATION

## (undated) DEVICE — PROTECTOR EYE PT SELF ADH NS OPT GRD LF

## (undated) DEVICE — PAD CLD R HIP REG HOSE NONSTERILE

## (undated) DEVICE — KIT INT FIX FEM TIB CKPT MAKOPLASTY

## (undated) DEVICE — CANNULA ARTHSCP L7CM DIA7MM TRNSLUC THRD FLX W/ NO SQUIRT

## (undated) DEVICE — KIT TRK KNEE PROC VIZADISC

## (undated) DEVICE — BOOT POS LEG DEMAYO

## (undated) DEVICE — CLASSIC CLD THER SYS

## (undated) DEVICE — FAN SPRAY KIT: Brand: PULSAVAC®

## (undated) DEVICE — BLADE SURG SAW STD S STL OSC W/ SERR EDGE DISP

## (undated) DEVICE — BLADE SURG SAW S STL NAR OSC W/ SERR EDGE DISP

## (undated) DEVICE — KIT DRP FOR RIO ROBOTIC ARM ASST SYS

## (undated) DEVICE — SUTURE FIBERTAPE FIBERWIRE SZ 2-0 30IN NONABSORB BLU AR72377

## (undated) DEVICE — 3M™ MICROFOAM™ TAPE 1528-4: Brand: 3M™ MICROFOAM™

## (undated) DEVICE — NEEDLE SPNL L3.5IN PNK HUB S STL REG WALL FIT STYL W/ QNCKE

## (undated) DEVICE — ZIMMER® STERILE DISPOSABLE TOURNIQUET CUFF WITH PLC, DUAL PORT, SINGLE BLADDER, 30 IN. (76 CM)

## (undated) DEVICE — SUTURE VCRL SZ 2-0 L18IN ABSRB UD CT-1 L36MM 1/2 CIR J839D

## (undated) DEVICE — INTENDED FOR TISSUE SEPARATION, AND OTHER PROCEDURES THAT REQUIRE A SHARP SURGICAL BLADE TO PUNCTURE OR CUT.: Brand: BARD-PARKER ® STAINLESS STEEL BLADES

## (undated) DEVICE — GLOVE ORANGE PI 7 1/2   MSG9075

## (undated) DEVICE — KIT CHAIR TRIMANO FOAM W/ SUPP ARM DRP ERGONOMICALLY DESIGNED

## (undated) DEVICE — Z INACTIVE USE 2660663 SOLUTION IRRIG 1000ML STRIL H2O USP PLAS POUR BTL

## (undated) DEVICE — PAD,ABDOMINAL,5"X9",ST,LF,25/BX: Brand: MEDLINE INDUSTRIES, INC.

## (undated) DEVICE — T4 HOOD

## (undated) DEVICE — BLADE SAW RESECTOR CUT 4MM

## (undated) DEVICE — TUBING, SUCTION, 9/32" X 10', STRAIGHT: Brand: MEDLINE

## (undated) DEVICE — PAD THER SM SHLDR WRP ON REG HOSE

## (undated) DEVICE — ELECTRODE ES AD CRDLSS PT RET REM POLYHESIVE

## (undated) DEVICE — SYRINGE MED 30ML STD CLR PLAS LUERLOCK TIP N CTRL DISP

## (undated) DEVICE — SPONGE GZ W4XL8IN COT WVN 12 PLY

## (undated) DEVICE — NEEDLE HYPO 20GA L1.5IN YEL POLYPR HUB S STL REG BVL STR

## (undated) DEVICE — Device: Brand: POWER-FLO®

## (undated) DEVICE — TOWEL,OR,DSP,ST,BLUE,STD,6/PK,12PK/CS: Brand: MEDLINE

## (undated) DEVICE — GLOVE SURG SZ 8 CRM LTX FREE POLYISOPRENE POLYMER BEAD ANTI

## (undated) DEVICE — GLOVE SURG SZ 7 CRM LTX FREE POLYISOPRENE POLYMER BEAD ANTI

## (undated) DEVICE — NEEDLE SCORPION HD MEGA LOADER

## (undated) DEVICE — DUAL CUT SAGITTAL BLADE

## (undated) DEVICE — SHOULDER PK

## (undated) DEVICE — COVER,TABLE,44X90,STERILE: Brand: MEDLINE

## (undated) DEVICE — [AGGRESSIVE PLUS CUTTER, ARTHROSCOPIC SHAVER BLADE,  DO NOT RESTERILIZE,  DO NOT USE IF PACKAGE IS DAMAGED,  KEEP DRY,  KEEP AWAY FROM SUNLIGHT]: Brand: FORMULA

## (undated) DEVICE — MAT ABSRB W28XL48IN C6L FLR ULT W POLY BK

## (undated) DEVICE — SURGICAL PROCEDURE PACK TOT KNEE LF

## (undated) DEVICE — SUTURE ABSORBABLE 3-0 PS-1 18 IN UD MONOCRYL + STRATAFIX SXMP1B102

## (undated) DEVICE — SUTURE ETHILON SZ 3-0 L30IN NONABSORBABLE BLK FS-1 L24MM 3/8 669H

## (undated) DEVICE — PIN BNE FIX TEMP L140MM DIA4MM MAKO

## (undated) DEVICE — MARKER SURG SKIN UTIL REGULAR/FINE 2 TIP W/ RUL AND 9 LBL

## (undated) DEVICE — TUBING PMP L16FT MAIN DISP FOR AR-6400 AR-6475 Â€“ ORDER MULTIPLES OF 10 EACH

## (undated) DEVICE — SPONGE LAP W18XL18IN WHT COT 4 PLY FLD STRUNG RADPQ DISP ST

## (undated) DEVICE — COUNTER NDL 30 COUNT FOAM STRP SGL MAG

## (undated) DEVICE — SUTURE VCRL SZ 2-0 L27IN ABSRB VLT L26MM SH 1/2 CIR J317H

## (undated) DEVICE — GLOVE SURG SZ 65 L12IN FNGR THK79MIL GRN LTX FREE

## (undated) DEVICE — GLOVE SURG SZ 65 CRM LTX FREE POLYISOPRENE POLYMER BEAD ANTI

## (undated) DEVICE — APPLICATOR MEDICATED 26 CC SOLUTION HI LT ORNG CHLORAPREP

## (undated) DEVICE — 3M™ STERI-DRAPE™ U-DRAPE 1067 1067 5/BX 4BX/CS/CTN&#X20;: Brand: STERI-DRAPE™

## (undated) DEVICE — Z INACTIVE NO ACTIVE SUPPLIER APPLICATOR MEDICATED 26 CC TINT HI-LITE ORNG STRL CHLORAPREP